# Patient Record
Sex: MALE | Race: BLACK OR AFRICAN AMERICAN | NOT HISPANIC OR LATINO | Employment: OTHER | RURAL
[De-identification: names, ages, dates, MRNs, and addresses within clinical notes are randomized per-mention and may not be internally consistent; named-entity substitution may affect disease eponyms.]

---

## 2020-10-21 ENCOUNTER — HISTORICAL (OUTPATIENT)
Dept: ADMINISTRATIVE | Facility: HOSPITAL | Age: 60
End: 2020-10-21

## 2020-10-21 LAB
BACTERIA #/AREA URNS HPF: ABNORMAL /HPF
BILIRUB UR QL STRIP: NEGATIVE MG/DL
CLARITY UR: CLEAR
COLOR UR: YELLOW
GLUCOSE UR STRIP-MCNC: NEGATIVE MG/DL
HYALINE CASTS #/AREA URNS LPF: ABNORMAL /LPF (ref 0–2)
KETONES UR STRIP-SCNC: NEGATIVE MG/DL
LEUKOCYTE ESTERASE UR QL STRIP: NEGATIVE LEU/UL
NITRITE UR QL STRIP: NEGATIVE
PH UR STRIP: 5 PH UNITS (ref 5–8)
PROT UR QL STRIP: NEGATIVE MG/DL
RBC # UR STRIP: NEGATIVE ERY/UL
RBC #/AREA URNS HPF: ABNORMAL /HPF (ref 0–3)
SP GR UR STRIP: 1.02 (ref 1–1.03)
SQUAMOUS #/AREA URNS LPF: ABNORMAL /LPF
UROBILINOGEN UR STRIP-ACNC: 0.2 EU/DL
WBC #/AREA URNS HPF: ABNORMAL /HPF (ref 0–5)
YEAST #/AREA URNS HPF: ABNORMAL /HPF

## 2020-10-23 LAB
REPORT: NORMAL

## 2022-05-09 ENCOUNTER — HOSPITAL ENCOUNTER (INPATIENT)
Facility: HOSPITAL | Age: 62
LOS: 9 days | Discharge: HOSPICE | DRG: 292 | End: 2022-05-18
Attending: INTERNAL MEDICINE | Admitting: INTERNAL MEDICINE
Payer: MEDICARE

## 2022-05-09 DIAGNOSIS — L97.929 VENOUS ULCERS OF BOTH LOWER EXTREMITIES: ICD-10-CM

## 2022-05-09 DIAGNOSIS — I83.019 VENOUS ULCERS OF BOTH LOWER EXTREMITIES: ICD-10-CM

## 2022-05-09 DIAGNOSIS — I50.82 BIVENTRICULAR CONGESTIVE HEART FAILURE: ICD-10-CM

## 2022-05-09 DIAGNOSIS — R32 INCONTINENCE ASSOCIATED DERMATITIS: ICD-10-CM

## 2022-05-09 DIAGNOSIS — I50.9 HEART FAILURE: ICD-10-CM

## 2022-05-09 DIAGNOSIS — L97.919 VENOUS ULCERS OF BOTH LOWER EXTREMITIES: ICD-10-CM

## 2022-05-09 DIAGNOSIS — I48.91 AFIB: ICD-10-CM

## 2022-05-09 DIAGNOSIS — I83.029 VENOUS ULCERS OF BOTH LOWER EXTREMITIES: ICD-10-CM

## 2022-05-09 DIAGNOSIS — I50.9 CHF (CONGESTIVE HEART FAILURE): ICD-10-CM

## 2022-05-09 DIAGNOSIS — I48.91 A-FIB: ICD-10-CM

## 2022-05-09 DIAGNOSIS — T14.8XXA MULTIPLE WOUNDS OF SKIN: ICD-10-CM

## 2022-05-09 DIAGNOSIS — I87.2 VENOUS STASIS DERMATITIS OF BOTH LOWER EXTREMITIES: ICD-10-CM

## 2022-05-09 DIAGNOSIS — L25.8 INCONTINENCE ASSOCIATED DERMATITIS: ICD-10-CM

## 2022-05-09 PROBLEM — K21.9 GERD (GASTROESOPHAGEAL REFLUX DISEASE): Status: ACTIVE | Noted: 2022-05-09

## 2022-05-09 PROBLEM — F17.200 SMOKER: Status: ACTIVE | Noted: 2022-05-09

## 2022-05-09 PROBLEM — J44.9 CHRONIC OBSTRUCTIVE PULMONARY DISEASE: Status: ACTIVE | Noted: 2022-05-09

## 2022-05-09 PROBLEM — F03.90 DEMENTIA: Status: ACTIVE | Noted: 2022-05-09

## 2022-05-09 PROBLEM — L97.202: Status: ACTIVE | Noted: 2022-05-09

## 2022-05-09 PROBLEM — I10 HTN (HYPERTENSION): Status: ACTIVE | Noted: 2022-05-09

## 2022-05-09 PROBLEM — F32.A DEPRESSION: Status: ACTIVE | Noted: 2022-05-09

## 2022-05-09 PROBLEM — F10.10 ALCOHOL ABUSE: Status: ACTIVE | Noted: 2022-05-09

## 2022-05-09 PROBLEM — J44.1 COPD EXACERBATION: Status: ACTIVE | Noted: 2022-05-09

## 2022-05-09 PROBLEM — N18.2 STAGE 2 CHRONIC KIDNEY DISEASE: Status: ACTIVE | Noted: 2022-05-09

## 2022-05-09 PROBLEM — Z99.81 OXYGEN DEPENDENT: Status: ACTIVE | Noted: 2022-05-09

## 2022-05-09 PROBLEM — N18.9 CKD (CHRONIC KIDNEY DISEASE): Status: ACTIVE | Noted: 2022-05-09

## 2022-05-09 PROBLEM — I13.0 HYPERTENSIVE HEART DISEASE WITH CONGESTIVE HEART FAILURE AND CHRONIC KIDNEY DISEASE: Status: ACTIVE | Noted: 2022-05-09

## 2022-05-09 PROBLEM — I48.0 PAROXYSMAL ATRIAL FIBRILLATION: Status: ACTIVE | Noted: 2022-05-09

## 2022-05-09 PROBLEM — K74.60 CIRRHOSIS: Status: ACTIVE | Noted: 2022-05-09

## 2022-05-09 PROBLEM — I50.43 ACUTE ON CHRONIC COMBINED SYSTOLIC AND DIASTOLIC HEART FAILURE: Status: ACTIVE | Noted: 2022-05-09

## 2022-05-09 PROBLEM — E78.2 MIXED HYPERLIPIDEMIA: Status: ACTIVE | Noted: 2022-05-09

## 2022-05-09 PROCEDURE — 27000221 HC OXYGEN, UP TO 24 HOURS

## 2022-05-09 PROCEDURE — 25000003 PHARM REV CODE 250

## 2022-05-09 PROCEDURE — 94640 AIRWAY INHALATION TREATMENT: CPT

## 2022-05-09 PROCEDURE — 94761 N-INVAS EAR/PLS OXIMETRY MLT: CPT

## 2022-05-09 PROCEDURE — 25000003 PHARM REV CODE 250: Performed by: INTERNAL MEDICINE

## 2022-05-09 PROCEDURE — 99223 PR INITIAL HOSPITAL CARE,LEVL III: ICD-10-PCS | Mod: AI,,, | Performed by: INTERNAL MEDICINE

## 2022-05-09 PROCEDURE — 11000001 HC ACUTE MED/SURG PRIVATE ROOM

## 2022-05-09 PROCEDURE — 25000242 PHARM REV CODE 250 ALT 637 W/ HCPCS

## 2022-05-09 PROCEDURE — 99223 1ST HOSP IP/OBS HIGH 75: CPT | Mod: AI,,, | Performed by: INTERNAL MEDICINE

## 2022-05-09 RX ORDER — AMIODARONE HYDROCHLORIDE 200 MG/1
200 TABLET ORAL 2 TIMES DAILY
Status: DISCONTINUED | OUTPATIENT
Start: 2022-05-09 | End: 2022-05-09

## 2022-05-09 RX ORDER — BUMETANIDE 0.25 MG/ML
1 INJECTION INTRAMUSCULAR; INTRAVENOUS 2 TIMES DAILY
Status: DISCONTINUED | OUTPATIENT
Start: 2022-05-09 | End: 2022-05-11

## 2022-05-09 RX ORDER — HYDRALAZINE HYDROCHLORIDE 20 MG/ML
10 INJECTION INTRAMUSCULAR; INTRAVENOUS EVERY 6 HOURS PRN
Status: DISCONTINUED | OUTPATIENT
Start: 2022-05-09 | End: 2022-05-18 | Stop reason: HOSPADM

## 2022-05-09 RX ORDER — CAMPHOR
SPIRIT TOPICAL 4 TIMES DAILY PRN
Status: DISCONTINUED | OUTPATIENT
Start: 2022-05-09 | End: 2022-05-18 | Stop reason: HOSPADM

## 2022-05-09 RX ORDER — BUPROPION HYDROCHLORIDE 75 MG/1
75 TABLET ORAL 2 TIMES DAILY
Status: ON HOLD | COMMUNITY
Start: 2022-03-28 | End: 2022-05-16

## 2022-05-09 RX ORDER — MUPIROCIN 20 MG/G
OINTMENT TOPICAL 2 TIMES DAILY
Status: COMPLETED | OUTPATIENT
Start: 2022-05-09 | End: 2022-05-14

## 2022-05-09 RX ORDER — BUPROPION HYDROCHLORIDE 75 MG/1
75 TABLET ORAL 2 TIMES DAILY
Status: DISCONTINUED | OUTPATIENT
Start: 2022-05-09 | End: 2022-05-18 | Stop reason: HOSPADM

## 2022-05-09 RX ORDER — VALSARTAN 40 MG/1
40 TABLET ORAL 2 TIMES DAILY
Status: ON HOLD | COMMUNITY
End: 2022-05-18 | Stop reason: HOSPADM

## 2022-05-09 RX ORDER — QUETIAPINE FUMARATE 100 MG/1
100 TABLET, FILM COATED ORAL 2 TIMES DAILY
Status: DISCONTINUED | OUTPATIENT
Start: 2022-05-09 | End: 2022-05-14

## 2022-05-09 RX ORDER — ACETAMINOPHEN 325 MG/1
650 TABLET ORAL EVERY 4 HOURS PRN
Status: DISCONTINUED | OUTPATIENT
Start: 2022-05-09 | End: 2022-05-18 | Stop reason: HOSPADM

## 2022-05-09 RX ORDER — IPRATROPIUM BROMIDE AND ALBUTEROL SULFATE 2.5; .5 MG/3ML; MG/3ML
3 SOLUTION RESPIRATORY (INHALATION) EVERY 6 HOURS
Status: DISCONTINUED | OUTPATIENT
Start: 2022-05-09 | End: 2022-05-18 | Stop reason: HOSPADM

## 2022-05-09 RX ORDER — TALC
6 POWDER (GRAM) TOPICAL NIGHTLY PRN
Status: DISCONTINUED | OUTPATIENT
Start: 2022-05-09 | End: 2022-05-18 | Stop reason: HOSPADM

## 2022-05-09 RX ORDER — BUMETANIDE 0.5 MG/1
1.5 TABLET ORAL DAILY
Status: ON HOLD | COMMUNITY
Start: 2022-03-28 | End: 2022-05-18 | Stop reason: HOSPADM

## 2022-05-09 RX ORDER — ONDANSETRON 4 MG/1
8 TABLET, ORALLY DISINTEGRATING ORAL EVERY 8 HOURS PRN
Status: DISCONTINUED | OUTPATIENT
Start: 2022-05-09 | End: 2022-05-10

## 2022-05-09 RX ORDER — BUMETANIDE 0.25 MG/ML
1 INJECTION INTRAMUSCULAR; INTRAVENOUS DAILY
Status: DISCONTINUED | OUTPATIENT
Start: 2022-05-10 | End: 2022-05-09

## 2022-05-09 RX ORDER — METOPROLOL TARTRATE 50 MG/1
50 TABLET ORAL 2 TIMES DAILY
Status: DISCONTINUED | OUTPATIENT
Start: 2022-05-09 | End: 2022-05-09

## 2022-05-09 RX ORDER — POLYETHYLENE GLYCOL 3350 17 G/17G
17 POWDER, FOR SOLUTION ORAL DAILY
Status: DISCONTINUED | OUTPATIENT
Start: 2022-05-10 | End: 2022-05-18 | Stop reason: HOSPADM

## 2022-05-09 RX ORDER — FAMOTIDINE 10 MG/ML
20 INJECTION INTRAVENOUS 2 TIMES DAILY
Status: DISCONTINUED | OUTPATIENT
Start: 2022-05-09 | End: 2022-05-10

## 2022-05-09 RX ORDER — LACTULOSE 10 G/15ML
20 SOLUTION ORAL 4 TIMES DAILY
Status: DISCONTINUED | OUTPATIENT
Start: 2022-05-09 | End: 2022-05-18 | Stop reason: HOSPADM

## 2022-05-09 RX ORDER — AMIODARONE HYDROCHLORIDE 200 MG/1
200 TABLET ORAL 2 TIMES DAILY
Status: DISCONTINUED | OUTPATIENT
Start: 2022-05-09 | End: 2022-05-11

## 2022-05-09 RX ORDER — QUETIAPINE FUMARATE 100 MG/1
100 TABLET, FILM COATED ORAL 2 TIMES DAILY
Status: ON HOLD | COMMUNITY
End: 2022-05-18 | Stop reason: HOSPADM

## 2022-05-09 RX ORDER — METOLAZONE 2.5 MG/1
2.5 TABLET ORAL DAILY
Status: ON HOLD | COMMUNITY
End: 2022-05-18 | Stop reason: HOSPADM

## 2022-05-09 RX ORDER — BUMETANIDE 0.25 MG/ML
1 INJECTION INTRAMUSCULAR; INTRAVENOUS 2 TIMES DAILY
Status: DISCONTINUED | OUTPATIENT
Start: 2022-05-09 | End: 2022-05-09

## 2022-05-09 RX ORDER — PROCHLORPERAZINE EDISYLATE 5 MG/ML
5 INJECTION INTRAMUSCULAR; INTRAVENOUS EVERY 6 HOURS PRN
Status: DISCONTINUED | OUTPATIENT
Start: 2022-05-09 | End: 2022-05-18 | Stop reason: HOSPADM

## 2022-05-09 RX ORDER — METOPROLOL TARTRATE 50 MG/1
50 TABLET ORAL 2 TIMES DAILY
Status: DISCONTINUED | OUTPATIENT
Start: 2022-05-09 | End: 2022-05-12

## 2022-05-09 RX ORDER — AMIODARONE HYDROCHLORIDE 200 MG/1
200 TABLET ORAL DAILY
COMMUNITY

## 2022-05-09 RX ORDER — CARVEDILOL 3.12 MG/1
3.12 TABLET ORAL 2 TIMES DAILY
Status: ON HOLD | COMMUNITY
End: 2022-05-18 | Stop reason: HOSPADM

## 2022-05-09 RX ADMIN — LACTULOSE 20 G: 20 SOLUTION ORAL at 09:05

## 2022-05-09 RX ADMIN — RIFAXIMIN 550 MG: 550 TABLET ORAL at 09:05

## 2022-05-09 RX ADMIN — METOPROLOL TARTRATE 50 MG: 50 TABLET, FILM COATED ORAL at 09:05

## 2022-05-09 RX ADMIN — QUETIAPINE FUMARATE 100 MG: 100 TABLET ORAL at 09:05

## 2022-05-09 RX ADMIN — AMIODARONE HYDROCHLORIDE 200 MG: 200 TABLET ORAL at 09:05

## 2022-05-09 RX ADMIN — BUPROPION HYDROCHLORIDE 75 MG: 75 TABLET, FILM COATED ORAL at 09:05

## 2022-05-09 RX ADMIN — FAMOTIDINE 20 MG: 10 INJECTION, SOLUTION INTRAVENOUS at 10:05

## 2022-05-09 RX ADMIN — LACTULOSE 20 G: 20 SOLUTION ORAL at 06:05

## 2022-05-09 RX ADMIN — BUMETANIDE 1 MG: 0.25 INJECTION, SOLUTION INTRAMUSCULAR; INTRAVENOUS at 10:05

## 2022-05-09 RX ADMIN — APIXABAN 2.5 MG: 5 TABLET, FILM COATED ORAL at 09:05

## 2022-05-09 RX ADMIN — MUPIROCIN: 20 OINTMENT TOPICAL at 10:05

## 2022-05-09 RX ADMIN — IPRATROPIUM BROMIDE AND ALBUTEROL SULFATE 3 ML: 2.5; .5 SOLUTION RESPIRATORY (INHALATION) at 07:05

## 2022-05-09 NOTE — SUBJECTIVE & OBJECTIVE
Past Medical History:   Diagnosis Date    AAA (abdominal aortic aneurysm)     s/p repair    Anxiety disorder, unspecified     CHF (congestive heart failure)     Biventricular HF    CKD (chronic kidney disease)     COPD (chronic obstructive pulmonary disease)     Current use of long term anticoagulation     DVT (deep venous thrombosis)     GERD (gastroesophageal reflux disease)     HLD (hyperlipidemia)     HTN (hypertension)     Paroxysmal atrial fibrillation     Unspecified cirrhosis of liver     Unspecified cirrhosis of liver        Past Surgical History:   Procedure Laterality Date    atortic aneu      REPAIR OF ABDOMINAL AORTIC ANEURYSM         Review of patient's allergies indicates:   Allergen Reactions    Lasix [furosemide]        No current facility-administered medications on file prior to encounter.     Current Outpatient Medications on File Prior to Encounter   Medication Sig    amiodarone (PACERONE) 200 MG Tab Take 200 mg by mouth 2 (two) times a day.    bumetanide (BUMEX) 0.5 MG Tab Take 2 mg by mouth once daily.    buPROPion (WELLBUTRIN) 75 MG tablet Take 75 mg by mouth 2 (two) times daily.    carvediloL (COREG) 6.25 MG tablet Take 6.25 mg by mouth 2 (two) times daily.    metOLazone (ZAROXOLYN) 2.5 MG tablet Take 2.5 mg by mouth once daily.    QUEtiapine (SEROQUEL) 100 MG Tab Take 100 mg by mouth 2 (two) times a day.    valsartan (DIOVAN) 40 MG tablet Take 40 mg by mouth 2 (two) times daily.    lactulose (CEPHULAC) 20 gram Pack Take 20 g by mouth 4 (four) times daily.    rifAXImin (XIFAXAN) 200 mg Tab Take 550 mg by mouth 2 (two) times daily.     Family History       Problem Relation (Age of Onset)    No Known Problems Mother, Father          Tobacco Use    Smoking status: Current Every Day Smoker     Packs/day: 1.00     Types: Cigarettes    Smokeless tobacco: Never Used   Substance and Sexual Activity    Alcohol use: Not Currently    Drug use: Never    Sexual activity: Not Currently     Review of  Systems   Constitutional:  Negative for activity change, appetite change, chills, diaphoresis, fatigue and fever.   Respiratory:  Positive for shortness of breath.    Cardiovascular:  Positive for leg swelling. Negative for chest pain.   Gastrointestinal:  Negative for nausea and vomiting.   Genitourinary:  Positive for scrotal swelling.   Skin:  Positive for rash and wound.   Neurological:  Negative for weakness and numbness.   Psychiatric/Behavioral:  Negative for confusion.    Objective:     Vital Signs (Most Recent):  Temp: 98.5 °F (36.9 °C) (05/09/22 1804)  Pulse: (!) 11 (05/09/22 1804)  Resp: 20 (05/09/22 1804)  BP: (!) 80/57 (05/09/22 1804)   Vital Signs (24h Range):  Temp:  [98.5 °F (36.9 °C)] 98.5 °F (36.9 °C)  Pulse:  [11] 11  Resp:  [20] 20  BP: (80)/(57) 80/57     Weight: 125 kg (275 lb 9.2 oz)  Body mass index is 34.44 kg/m².    Physical Exam  HENT:      Head: Normocephalic and atraumatic.      Mouth/Throat:      Mouth: Mucous membranes are moist.      Pharynx: Oropharynx is clear.   Eyes:      Extraocular Movements: Extraocular movements intact.      Conjunctiva/sclera: Conjunctivae normal.      Pupils: Pupils are equal, round, and reactive to light.   Cardiovascular:      Rate and Rhythm: Normal rate and regular rhythm.      Pulses: Normal pulses.      Heart sounds: Normal heart sounds.   Pulmonary:      Effort: Pulmonary effort is normal.      Breath sounds: Wheezing present.   Abdominal:      General: Abdomen is flat. Bowel sounds are normal.      Palpations: Abdomen is soft.   Musculoskeletal:         General: Normal range of motion.      Cervical back: Normal range of motion.   Skin:     General: Skin is warm.      Capillary Refill: Capillary refill takes less than 2 seconds.   Neurological:      General: No focal deficit present.      Mental Status: He is alert and oriented to person, place, and time.         CRANIAL NERVES     CN III, IV, VI   Pupils are equal, round, and reactive to light.      Significant Labs: All pertinent labs within the past 24 hours have been reviewed.    Significant Imaging: I have reviewed all pertinent imaging results/findings within the past 24 hours.

## 2022-05-09 NOTE — HPI
The patient is a 60yo AA male with a MedHX of CHF, paroxysmal afib, chronic anticoagulation of eliquis, HTN, HLD, AAA repair, HLD, COPD on home oxygen, CKD stage III, cirrhosis and GERD who presents to LTAC for long-term care of his CHF exacerbation and acute on chronic renal failure. The patient had originally been admitted to [facility] for CHF exacerbation on 04/27, where he was also found to have afib with RVR and heme positive stools. His Eliquis was held and GI saw him and did not recommend any inpatient GI workup. He was started back on his Eliquis and IV diuresis with bumex. This was stopped after his creatinine was found to be elevated above baseline. He was started back on diuretics after his volume status was found to still be decompensated. The patient's home metoprolol dose was also increased while at [facility], and his home losartan, was held.    The patient was found to be stable at [facility] and was accepted to LTAC for further monitoring/care by Dr. Solis for his acute on chronic CHF exacerbation.

## 2022-05-09 NOTE — ASSESSMENT & PLAN NOTE
- Continuous oxygen  - Continue bumetinide 1mg IV bid  - dexter  - Cardiology consult  - ECHO pending  - telemetry  - continue metoprolol 50mg bid   - daily weights  - daily I+O  - low sodium diet  - dietary consult

## 2022-05-09 NOTE — ASSESSMENT & PLAN NOTE
- Continuous oxygen  - Continue bumetinide 1mg IV bid  - Cardiology consult  - ECHO pending  - telemetry  - continue metoprolol 50mg bid   - daily weights  - daily I+O  - low sodium diet  - dietary consult

## 2022-05-09 NOTE — H&P
[facility] Medicine  History & Physical    Patient Name: Modesto Patrick  MRN: [id number]  Patient Class: IP- Inpatient  Admission Date: 5/9/2022  Attending Physician: Neeta Solis MD   Primary Care Provider: Primary Doctor No         Patient information was obtained from patient, past medical records and ER records.     Subjective:     Principal Problem:Acute on chronic combined systolic and diastolic heart failure    Chief Complaint: No chief complaint on file.       HPI: The patient is a 62yo AA male with a MedHX of CHF, paroxysmal afib, chronic anticoagulation of eliquis, HTN, HLD, AAA repair, HLD, COPD on home oxygen, CKD stage III, cirrhosis and GERD who presents to LT for long-term care of his CHF exacerbation and acute on chronic renal failure. The patient had originally been admitted to [facility] for CHF exacerbation on 04/27, where he was also found to have afib with RVR and heme positive stools. His Eliquis was held and GI saw him and did not recommend any inpatient GI workup. He was started back on his Eliquis and IV diuresis with bumex. This was stopped after his creatinine was found to be elevated above baseline. He was started back on diuretics after his volume status was found to still be decompensated. The patient's home metoprolol dose was also increased while at [facility], and his home losartan, was held.    The patient was found to be stable at [facility] and was accepted to LTAC for further monitoring/care by Dr. Solis for his acute on chronic CHF exacerbation.      Past Medical History:   Diagnosis Date    CHF (congestive heart failure)     CKD (chronic kidney disease)     COPD (chronic obstructive pulmonary disease)     Current use of long term anticoagulation     HLD (hyperlipidemia)     HTN (hypertension)     Paroxysmal atrial fibrillation     Unspecified cirrhosis of liver        Past Surgical History:   Procedure Laterality  Date    atortic aneu      REPAIR OF ABDOMINAL AORTIC ANEURYSM         Review of patient's allergies indicates:  Not on File    No current facility-administered medications on file prior to encounter.     Current Outpatient Medications on File Prior to Encounter   Medication Sig    amiodarone (PACERONE) 200 MG Tab Take 200 mg by mouth 2 (two) times a day.    bumetanide (BUMEX) 0.5 MG Tab Take 2 mg by mouth once daily.    buPROPion (WELLBUTRIN) 75 MG tablet Take 75 mg by mouth 2 (two) times daily.    carvediloL (COREG) 6.25 MG tablet Take 6.25 mg by mouth 2 (two) times daily.    metOLazone (ZAROXOLYN) 2.5 MG tablet Take 2.5 mg by mouth once daily.    QUEtiapine (SEROQUEL) 100 MG Tab Take 100 mg by mouth 2 (two) times a day.    valsartan (DIOVAN) 40 MG tablet Take 40 mg by mouth 2 (two) times daily.    lactulose (CEPHULAC) 20 gram Pack Take 20 g by mouth 4 (four) times daily.    rifAXImin (XIFAXAN) 200 mg Tab Take 550 mg by mouth 2 (two) times daily.     Family History       Problem Relation (Age of Onset)    No Known Problems Mother, Father          Tobacco Use    Smoking status: Current Every Day Smoker     Packs/day: 1.00     Types: Cigarettes    Smokeless tobacco: Never Used   Substance and Sexual Activity    Alcohol use: Not Currently    Drug use: Never    Sexual activity: Not Currently     Review of Systems   Constitutional:  Negative for activity change, appetite change, chills, diaphoresis and fatigue.   Eyes:  Negative for photophobia and visual disturbance.   Respiratory:  Positive for shortness of breath.    Cardiovascular:  Positive for leg swelling. Negative for chest pain and palpitations.   Gastrointestinal:  Negative for blood in stool, nausea and vomiting.   Genitourinary:  Positive for scrotal swelling.   Skin:  Positive for rash and wound.   Neurological:  Negative for weakness, numbness and headaches.   Psychiatric/Behavioral:  Negative for confusion.    Objective:     Vital Signs  (Most Recent):    Vital Signs (24h Range):           There is no height or weight on file to calculate BMI.    Physical Exam  HENT:      Head: Normocephalic and atraumatic.      Mouth/Throat:      Mouth: Mucous membranes are moist.      Pharynx: Oropharynx is clear.   Eyes:      Extraocular Movements: Extraocular movements intact.      Conjunctiva/sclera: Conjunctivae normal.      Pupils: Pupils are equal, round, and reactive to light.   Cardiovascular:      Rate and Rhythm: Normal rate and regular rhythm.      Pulses: Normal pulses.      Heart sounds: Normal heart sounds.   Pulmonary:      Breath sounds: Wheezing present.   Abdominal:      General: Abdomen is flat. Bowel sounds are normal.      Palpations: Abdomen is soft.   Musculoskeletal:         General: Normal range of motion.      Cervical back: Normal range of motion.   Skin:     General: Skin is warm.      Capillary Refill: Capillary refill takes less than 2 seconds.   Neurological:      General: No focal deficit present.      Mental Status: He is alert and oriented to person, place, and time.         CRANIAL NERVES     CN III, IV, VI   Pupils are equal, round, and reactive to light.     Significant Labs: All pertinent labs within the past 24 hours have been reviewed.    Significant Imaging: I have reviewed all pertinent imaging results/findings within the past 24 hours.    Assessment/Plan:     * Acute on chronic biventricular congestive heart failure  - Continuous oxygen  - Continue bumetinide 1mg IV bid  - duonebs  - Cardiology consult  - ECHO pending  - telemetry  - continue metoprolol 50mg bid   - daily weights  - daily I+O  - low sodium diet  - dietary consult        A-fib  - continue eliquis 2.5mg bid  - continue amiodarone 200mg bid  - telemetry      COPD (chronic obstructive pulmonary disease)  - continual oxygen via [place]  - duonebs q6      CKD (chronic kidney disease)  - bmp pending  - records say creatinine at baseline at 3.3 today at  [place]      GERD (gastroesophageal reflux disease)  - famotidine injection 20mg bID      Multiple wounds of skin  - has bilateral chronic lower extremity skin changes/wounds  - wound care consult       Depression  - continue buproprion      Cirrhosis  - continue rifaximin 550mg bid  - continue lactulose 20mg qid      HTN (hypertension)  - continue metoprolol 50mg bid      VTE Risk Mitigation (From admission, onward)         Ordered     apixaban tablet 2.5 mg  2 times daily         05/09/22 1746     IP VTE HIGH RISK PATIENT  Once         05/09/22 1736     Place sequential compression device  Until discontinued         05/09/22 1736                   Saskia Cortez MD  Department of Hospital Medicine   [facility] [place]

## 2022-05-09 NOTE — SUBJECTIVE & OBJECTIVE
Past Medical History:   Diagnosis Date    CHF (congestive heart failure)     CKD (chronic kidney disease)     COPD (chronic obstructive pulmonary disease)     Current use of long term anticoagulation     HLD (hyperlipidemia)     HTN (hypertension)     Paroxysmal atrial fibrillation     Unspecified cirrhosis of liver        Past Surgical History:   Procedure Laterality Date    atortic aneu      REPAIR OF ABDOMINAL AORTIC ANEURYSM         Review of patient's allergies indicates:  Not on File    No current facility-administered medications on file prior to encounter.     Current Outpatient Medications on File Prior to Encounter   Medication Sig    amiodarone (PACERONE) 200 MG Tab Take 200 mg by mouth 2 (two) times a day.    bumetanide (BUMEX) 0.5 MG Tab Take 2 mg by mouth once daily.    buPROPion (WELLBUTRIN) 75 MG tablet Take 75 mg by mouth 2 (two) times daily.    carvediloL (COREG) 6.25 MG tablet Take 6.25 mg by mouth 2 (two) times daily.    metOLazone (ZAROXOLYN) 2.5 MG tablet Take 2.5 mg by mouth once daily.    QUEtiapine (SEROQUEL) 100 MG Tab Take 100 mg by mouth 2 (two) times a day.    valsartan (DIOVAN) 40 MG tablet Take 40 mg by mouth 2 (two) times daily.    lactulose (CEPHULAC) 20 gram Pack Take 20 g by mouth 4 (four) times daily.    rifAXImin (XIFAXAN) 200 mg Tab Take 550 mg by mouth 2 (two) times daily.     Family History       Problem Relation (Age of Onset)    No Known Problems Mother, Father          Tobacco Use    Smoking status: Current Every Day Smoker     Packs/day: 1.00     Types: Cigarettes    Smokeless tobacco: Never Used   Substance and Sexual Activity    Alcohol use: Not Currently    Drug use: Never    Sexual activity: Not Currently     Review of Systems   Constitutional:  Negative for activity change, appetite change, chills, diaphoresis and fatigue.   Eyes:  Negative for photophobia and visual disturbance.   Respiratory:  Positive for shortness of breath.    Cardiovascular:  Positive for leg  swelling. Negative for chest pain and palpitations.   Gastrointestinal:  Negative for blood in stool, nausea and vomiting.   Genitourinary:  Positive for scrotal swelling.   Skin:  Positive for rash and wound.   Neurological:  Negative for weakness, numbness and headaches.   Psychiatric/Behavioral:  Negative for confusion.    Objective:     Vital Signs (Most Recent):    Vital Signs (24h Range):           There is no height or weight on file to calculate BMI.    Physical Exam  HENT:      Head: Normocephalic and atraumatic.      Mouth/Throat:      Mouth: Mucous membranes are moist.      Pharynx: Oropharynx is clear.   Eyes:      Extraocular Movements: Extraocular movements intact.      Conjunctiva/sclera: Conjunctivae normal.      Pupils: Pupils are equal, round, and reactive to light.   Cardiovascular:      Rate and Rhythm: Normal rate and regular rhythm.      Pulses: Normal pulses.      Heart sounds: Normal heart sounds.   Pulmonary:      Breath sounds: Wheezing present.   Abdominal:      General: Abdomen is flat. Bowel sounds are normal.      Palpations: Abdomen is soft.   Musculoskeletal:         General: Normal range of motion.      Cervical back: Normal range of motion.   Skin:     General: Skin is warm.      Capillary Refill: Capillary refill takes less than 2 seconds.   Neurological:      General: No focal deficit present.      Mental Status: He is alert and oriented to person, place, and time.         CRANIAL NERVES     CN III, IV, VI   Pupils are equal, round, and reactive to light.     Significant Labs: All pertinent labs within the past 24 hours have been reviewed.    Significant Imaging: I have reviewed all pertinent imaging results/findings within the past 24 hours.

## 2022-05-10 PROBLEM — N17.9 RENAL FAILURE (ARF), ACUTE ON CHRONIC: Status: ACTIVE | Noted: 2022-05-10

## 2022-05-10 PROBLEM — N18.9 RENAL FAILURE (ARF), ACUTE ON CHRONIC: Status: ACTIVE | Noted: 2022-05-10

## 2022-05-10 LAB
ANION GAP SERPL CALCULATED.3IONS-SCNC: 11 MMOL/L (ref 7–16)
AORTIC ROOT ANNULUS: 3.4 CM
AORTIC VALVE CUSP SEPERATION: 15.7 CM
AV INDEX (PROSTH): 0.14
AV MEAN GRADIENT: 9 MMHG
AV PEAK GRADIENT: 16 MMHG
AV VALVE AREA: 0.47 CM2
AV VELOCITY RATIO: 0.2
BASOPHILS # BLD AUTO: 0.02 K/UL (ref 0–0.2)
BASOPHILS NFR BLD AUTO: 0.3 % (ref 0–1)
BSA FOR ECHO PROCEDURE: 2.57 M2
BUN SERPL-MCNC: 73 MG/DL (ref 7–18)
BUN/CREAT SERPL: 20 (ref 6–20)
CALCIUM SERPL-MCNC: 8.7 MG/DL (ref 8.5–10.1)
CHLORIDE SERPL-SCNC: 103 MMOL/L (ref 98–107)
CO2 SERPL-SCNC: 31 MMOL/L (ref 21–32)
CREAT SERPL-MCNC: 3.67 MG/DL (ref 0.7–1.3)
CV ECHO LV RWT: 0.43 CM
DIFFERENTIAL METHOD BLD: ABNORMAL
DOP CALC AO PEAK VEL: 2 M/S
DOP CALC AO VTI: 37 CM
DOP CALC LVOT AREA: 3.5 CM2
DOP CALC LVOT DIAMETER: 2.1 CM
DOP CALC LVOT PEAK VEL: 0.4 M/S
DOP CALC LVOT STROKE VOLUME: 17.31 CM3
DOP CALC MV VTI: 57 CM
DOP CALCLVOT PEAK VEL VTI: 5 CM
E WAVE DECELERATION TIME: 179 MSEC
ECHO EF ESTIMATED: 25 %
ECHO LV POSTERIOR WALL: 1.27 CM (ref 0.6–1.1)
EJECTION FRACTION: 20 %
EOSINOPHIL # BLD AUTO: 0.03 K/UL (ref 0–0.5)
EOSINOPHIL NFR BLD AUTO: 0.4 % (ref 1–4)
ERYTHROCYTE [DISTWIDTH] IN BLOOD BY AUTOMATED COUNT: 16.7 % (ref 11.5–14.5)
FRACTIONAL SHORTENING: 8 % (ref 28–44)
GLUCOSE SERPL-MCNC: 101 MG/DL (ref 74–106)
GLUCOSE SERPL-MCNC: 132 MG/DL (ref 70–105)
GLUCOSE SERPL-MCNC: 143 MG/DL (ref 70–105)
HCT VFR BLD AUTO: 29 % (ref 40–54)
HGB BLD-MCNC: 9 G/DL (ref 13.5–18)
IMM GRANULOCYTES # BLD AUTO: 0.02 K/UL (ref 0–0.04)
IMM GRANULOCYTES NFR BLD: 0.3 % (ref 0–0.4)
INTERVENTRICULAR SEPTUM: 1.22 CM (ref 0.6–1.1)
IVC OSTIUM: 3 CM
LEFT ATRIUM SIZE: 4.8 CM
LEFT INTERNAL DIMENSION IN SYSTOLE: 5.4 CM (ref 2.1–4)
LEFT VENTRICLE DIASTOLIC VOLUME INDEX: 69 ML/M2
LEFT VENTRICLE DIASTOLIC VOLUME: 173.2 ML
LEFT VENTRICLE MASS INDEX: 128 G/M2
LEFT VENTRICLE SYSTOLIC VOLUME INDEX: 56.3 ML/M2
LEFT VENTRICLE SYSTOLIC VOLUME: 141.3 ML
LEFT VENTRICULAR INTERNAL DIMENSION IN DIASTOLE: 5.9 CM (ref 3.5–6)
LEFT VENTRICULAR MASS: 321.1 G
LVOT MG: 0.3 MMHG
LYMPHOCYTES # BLD AUTO: 1.27 K/UL (ref 1–4.8)
LYMPHOCYTES NFR BLD AUTO: 18.4 % (ref 27–41)
MCH RBC QN AUTO: 30.3 PG (ref 27–31)
MCHC RBC AUTO-ENTMCNC: 31 G/DL (ref 32–36)
MCV RBC AUTO: 97.6 FL (ref 80–96)
MONOCYTES # BLD AUTO: 1.47 K/UL (ref 0–0.8)
MONOCYTES NFR BLD AUTO: 21.3 % (ref 2–6)
MPC BLD CALC-MCNC: 10.4 FL (ref 9.4–12.4)
MV MEAN GRADIENT: 17 MMHG
MV PEAK E VEL: 1.8 M/S
MV PEAK GRADIENT: 28 MMHG
MV STENOSIS PRESSURE HALF TIME: 50 MS
MV VALVE AREA BY CONTINUITY EQUATION: 0.3 CM2
MV VALVE AREA P 1/2 METHOD: 4.4 CM2
NEUTROPHILS # BLD AUTO: 4.08 K/UL (ref 1.8–7.7)
NEUTROPHILS NFR BLD AUTO: 59.3 % (ref 53–65)
NRBC # BLD AUTO: 0 X10E3/UL
NRBC, AUTO (.00): 0 %
PISA TR MAX VEL: 2.5 M/S
PLATELET # BLD AUTO: 125 K/UL (ref 150–400)
POTASSIUM SERPL-SCNC: 5.4 MMOL/L (ref 3.5–5.1)
RA MAJOR: 5 CM
RA PRESSURE: 15 MMHG
RBC # BLD AUTO: 2.97 M/UL (ref 4.6–6.2)
RIGHT VENTRICULAR END-DIASTOLIC DIMENSION: 5.1 CM
SODIUM SERPL-SCNC: 140 MMOL/L (ref 136–145)
TR MAX PG: 25 MMHG
TRICUSPID ANNULAR PLANE SYSTOLIC EXCURSION: 1.9 CM
TV REST PULMONARY ARTERY PRESSURE: 40 MMHG
WBC # BLD AUTO: 6.89 K/UL (ref 4.5–11)

## 2022-05-10 PROCEDURE — 82962 GLUCOSE BLOOD TEST: CPT

## 2022-05-10 PROCEDURE — 99232 SBSQ HOSP IP/OBS MODERATE 35: CPT | Mod: ,,, | Performed by: NURSE PRACTITIONER

## 2022-05-10 PROCEDURE — 36415 COLL VENOUS BLD VENIPUNCTURE: CPT

## 2022-05-10 PROCEDURE — 25000242 PHARM REV CODE 250 ALT 637 W/ HCPCS

## 2022-05-10 PROCEDURE — 27000221 HC OXYGEN, UP TO 24 HOURS

## 2022-05-10 PROCEDURE — 99232 PR SUBSEQUENT HOSPITAL CARE,LEVL II: ICD-10-PCS | Mod: ,,, | Performed by: NURSE PRACTITIONER

## 2022-05-10 PROCEDURE — 11000001 HC ACUTE MED/SURG PRIVATE ROOM

## 2022-05-10 PROCEDURE — 63600175 PHARM REV CODE 636 W HCPCS: Performed by: INTERNAL MEDICINE

## 2022-05-10 PROCEDURE — 94761 N-INVAS EAR/PLS OXIMETRY MLT: CPT

## 2022-05-10 PROCEDURE — P9047 ALBUMIN (HUMAN), 25%, 50ML: HCPCS | Performed by: INTERNAL MEDICINE

## 2022-05-10 PROCEDURE — 80048 BASIC METABOLIC PNL TOTAL CA: CPT

## 2022-05-10 PROCEDURE — 99232 PR SUBSEQUENT HOSPITAL CARE,LEVL II: ICD-10-PCS | Mod: ,,, | Performed by: INTERNAL MEDICINE

## 2022-05-10 PROCEDURE — 97166 OT EVAL MOD COMPLEX 45 MIN: CPT

## 2022-05-10 PROCEDURE — 99232 SBSQ HOSP IP/OBS MODERATE 35: CPT | Mod: ,,, | Performed by: INTERNAL MEDICINE

## 2022-05-10 PROCEDURE — 97162 PT EVAL MOD COMPLEX 30 MIN: CPT

## 2022-05-10 PROCEDURE — 25000003 PHARM REV CODE 250

## 2022-05-10 PROCEDURE — 94640 AIRWAY INHALATION TREATMENT: CPT

## 2022-05-10 PROCEDURE — 25000003 PHARM REV CODE 250: Performed by: INTERNAL MEDICINE

## 2022-05-10 PROCEDURE — 85025 COMPLETE CBC W/AUTO DIFF WBC: CPT

## 2022-05-10 RX ORDER — SILVER SULFADIAZINE 10 G/1000G
CREAM TOPICAL
Status: DISCONTINUED | OUTPATIENT
Start: 2022-05-10 | End: 2022-05-18 | Stop reason: HOSPADM

## 2022-05-10 RX ORDER — MICONAZOLE NITRATE 2 %
POWDER (GRAM) TOPICAL 2 TIMES DAILY
Status: DISCONTINUED | OUTPATIENT
Start: 2022-05-11 | End: 2022-05-18 | Stop reason: HOSPADM

## 2022-05-10 RX ORDER — FAMOTIDINE 20 MG/1
20 TABLET, FILM COATED ORAL DAILY
Status: DISCONTINUED | OUTPATIENT
Start: 2022-05-11 | End: 2022-05-18 | Stop reason: HOSPADM

## 2022-05-10 RX ORDER — ONDANSETRON 4 MG/1
8 TABLET, FILM COATED ORAL EVERY 8 HOURS PRN
Status: DISCONTINUED | OUTPATIENT
Start: 2022-05-10 | End: 2022-05-18 | Stop reason: HOSPADM

## 2022-05-10 RX ORDER — FAMOTIDINE 20 MG/1
20 TABLET, FILM COATED ORAL 2 TIMES DAILY
Status: DISCONTINUED | OUTPATIENT
Start: 2022-05-10 | End: 2022-05-10

## 2022-05-10 RX ORDER — ALBUMIN HUMAN 250 G/1000ML
25 SOLUTION INTRAVENOUS ONCE
Status: COMPLETED | OUTPATIENT
Start: 2022-05-10 | End: 2022-05-10

## 2022-05-10 RX ADMIN — METOPROLOL TARTRATE 50 MG: 50 TABLET, FILM COATED ORAL at 09:05

## 2022-05-10 RX ADMIN — MUPIROCIN: 20 OINTMENT TOPICAL at 09:05

## 2022-05-10 RX ADMIN — RIFAXIMIN 550 MG: 550 TABLET ORAL at 09:05

## 2022-05-10 RX ADMIN — FAMOTIDINE 20 MG: 20 TABLET ORAL at 09:05

## 2022-05-10 RX ADMIN — BUPROPION HYDROCHLORIDE 75 MG: 75 TABLET, FILM COATED ORAL at 09:05

## 2022-05-10 RX ADMIN — SODIUM CHLORIDE, POTASSIUM CHLORIDE, SODIUM LACTATE AND CALCIUM CHLORIDE 250 ML: 600; 310; 30; 20 INJECTION, SOLUTION INTRAVENOUS at 10:05

## 2022-05-10 RX ADMIN — AMIODARONE HYDROCHLORIDE 200 MG: 200 TABLET ORAL at 09:05

## 2022-05-10 RX ADMIN — ALBUMIN (HUMAN) 25 G: 12.5 SOLUTION INTRAVENOUS at 01:05

## 2022-05-10 RX ADMIN — IPRATROPIUM BROMIDE AND ALBUTEROL SULFATE 3 ML: 2.5; .5 SOLUTION RESPIRATORY (INHALATION) at 08:05

## 2022-05-10 RX ADMIN — LACTULOSE 20 G: 20 SOLUTION ORAL at 09:05

## 2022-05-10 RX ADMIN — QUETIAPINE FUMARATE 100 MG: 100 TABLET ORAL at 09:05

## 2022-05-10 RX ADMIN — IPRATROPIUM BROMIDE AND ALBUTEROL SULFATE 3 ML: 2.5; .5 SOLUTION RESPIRATORY (INHALATION) at 07:05

## 2022-05-10 RX ADMIN — APIXABAN 2.5 MG: 5 TABLET, FILM COATED ORAL at 09:05

## 2022-05-10 RX ADMIN — IPRATROPIUM BROMIDE AND ALBUTEROL SULFATE 3 ML: 2.5; .5 SOLUTION RESPIRATORY (INHALATION) at 12:05

## 2022-05-10 RX ADMIN — IPRATROPIUM BROMIDE AND ALBUTEROL SULFATE 3 ML: 2.5; .5 SOLUTION RESPIRATORY (INHALATION) at 01:05

## 2022-05-10 RX ADMIN — LACTULOSE 20 G: 20 SOLUTION ORAL at 02:05

## 2022-05-10 RX ADMIN — POLYETHYLENE GLYCOL 3350 17 G: 17 POWDER, FOR SOLUTION ORAL at 09:05

## 2022-05-10 RX ADMIN — BUMETANIDE 1 MG: 0.25 INJECTION, SOLUTION INTRAMUSCULAR; INTRAVENOUS at 10:05

## 2022-05-10 RX ADMIN — LACTULOSE 20 G: 20 SOLUTION ORAL at 05:05

## 2022-05-10 NOTE — PROGRESS NOTES
Pharmacist Intervention IV to PO Note    Modesto Patrick is a 61 y.o. male being treated with IV medication famotidine    Patient Data:    Vital Signs (Most Recent):  Temp: 98.1 °F (36.7 °C) (05/10/22 0400)  Pulse: 98 (05/10/22 0738)  Resp: 18 (05/10/22 0738)  BP: (!) 80/55 (05/10/22 0400)  SpO2: 100 % (05/10/22 0738)   Vital Signs (72h Range):  Temp:  [98.1 °F (36.7 °C)-98.5 °F (36.9 °C)]   Pulse:  []   Resp:  [18-20]   BP: (80-90)/(55-72)   SpO2:  [92 %-100 %]      CBC:  Recent Labs   Lab 05/10/22  0342   WBC 6.89   RBC 2.97*   HGB 9.0*   HCT 29.0*   *   MCV 97.6*   MCH 30.3   MCHC 31.0*     CMP:     Recent Labs   Lab 05/10/22  0342      CALCIUM 8.7      K 5.4*   CO2 31      BUN 73*   CREATININE 3.67*       Dietary Orders:  Diet Orders  Report           Diet Low Sodium, 2gm: Low Sodium, 2gm starting at 05/09 1811            Based on the following criteria, this patient qualifies for intravenous to oral conversion:  [x] The patients gastrointestinal tract is functioning (tolerating medications via oral or enteral route for 24 hours and tolerating food or enteral feeds for 24 hours).    IV medication famotidine will be changed to oral medication famotidine    Pharmacist's Name: Hayley Fitzpatrick  Pharmacist's Extension: [phone]

## 2022-05-10 NOTE — PROGRESS NOTES
[facility]  [facility]  First Assessment Note         Reason for Assessment  Reason For Assessment: consult  Nutrition Risk Screen: no indicators present  Malnutrition  Is Patient Malnourished: No  Nutrition Diagnosis  Increased protein Needs   related to Decreased/ impaired skin integrity as evidenced by wounds    Nutrition Diagnosis Status: New      Nutrition Risk  Level of Risk/Frequency of Follow-up: high   Chewing or Swallowing Difficulty?: No Chewing or swallowing difficulty  Estimated/Assessed Needs  RMR (Greeley-St. Jeor Equation): 2126.63 Activity Factor: 1 Injury Factor: 1.2     Temp: 98.7 °F (37.1 °C)Oral  Weight Used For Calorie Calculations: 124.6 kg (274 lb 11.1 oz)   Energy Need Method: [place]-St Jeor    Weight Used For Protein Calculations: 93 kg (205 lb 0.4 oz)  Protein Requirements: 112-140  Estimated Fluid Requirement Method: RDA Method          Nutrition Prescription / Recommendations  Recommendation/Intervention: continue low sodium diet; will add ensure enlive to all meal trays to assist pt in meeting est nutr needs and facilitate wound healing  Goals: pt will meet est nutr needs; wound healing  Nutrition Goal Status: new  Current Diet Order: low sodium  Nutrition Order Comments: no % meal intake documented at this time  Recommended Diet: Low Sodium ( 2g Sodium)  Recommended Oral Supplement: Yovani [90 kcals, 2.5g Protein, 10g Carbs(3g Sugar), 7g L-Arginine, 7g L-Glutamine, Vitamin C 300mg, 9.5mg Zinc] three times daily and Ensure Enlive [360 kcals, 20g Protein, 44g Carbs(3g Fiber, 20g Sugar), 11g Fat three times daily  Is Nutrition Support Recommended: No  Is Education Recommended: No  Monitor and Evaluation  % current Intake: New Diet order with no P.O. intake documented currently  % intake to meet estimated needs: 75 - 100 %  Food and Nutrient Intake: energy intake, food and beverage intake  Food and Nutrient Adminstration: diet order  Anthropometric Measurements:  "height/length, weight, weight change, body mass index  Biochemical Data, Medical Tests and Procedures: electrolyte and renal panel, glucose/endocrine profile  Nutrition-Focused Physical Findings: skin     Current Medical Diagnosis and Past Medical History  Diagnosis: cardiac disease, pulmonary disease, renal disease, liver disease, gastrointestinal disease  Past Medical History:   Diagnosis Date    AAA (abdominal aortic aneurysm)     s/p repair    Anxiety disorder, unspecified     CHF (congestive heart failure)     Biventricular HF    CKD (chronic kidney disease)     COPD (chronic obstructive pulmonary disease)     Current use of long term anticoagulation     DVT (deep venous thrombosis)     GERD (gastroesophageal reflux disease)     HLD (hyperlipidemia)     HTN (hypertension)     Paroxysmal atrial fibrillation     Unspecified cirrhosis of liver     Unspecified cirrhosis of liver      Nutrition/Diet History  Food Allergies: NKFA  Lab/Procedures/Meds  Recent Labs   Lab 05/10/22  0342      K 5.4*   BUN 73*   CREATININE 3.67*      CALCIUM 8.7        Last A1c: No results found for: HGBA1C  Lab Results   Component Value Date    RBC 2.97 (L) 05/10/2022    HGB 9.0 (L) 05/10/2022    HCT 29.0 (L) 05/10/2022    MCV 97.6 (H) 05/10/2022    MCH 30.3 05/10/2022    MCHC 31.0 (L) 05/10/2022     Pertinent Labs Reviewed: reviewed  Pertinent Labs Comments: Hct 29.0, K 5.4, BUN 73, Creat 3.67  Pertinent Medications Reviewed: reviewed  Anthropometrics  Temp: 98.7 °F (37.1 °C)  Height Method: Stated  Height: 6' 3" (190.5 cm)  Height (inches): 75 in  Weight Method: Bed Scale  Weight: 124.6 kg (274 lb 11.1 oz)  Weight (lb): 274.7 lb  Ideal Body Weight (IBW), Male: 196 lb  % Ideal Body Weight, Male (lb): 140.15 %  BMI (Calculated): 34.3  BMI Grade: 30 - 34.9- obesity - grade I     Nutrition by Nursing  Diet/Nutrition Received: regular     Diet/Feeding Assistance: tray set-up     Last Bowel Movement: " 05/09/22              Nutrition Follow-Up  RD Follow-up?: Yes  Assessment and Plan  No new Assessment & Plan notes have been filed under this hospital service since the last note was generated.  Service: Nutrition

## 2022-05-10 NOTE — SUBJECTIVE & OBJECTIVE
Interval History:     BAIGAILEO  Having breakfast this am  Says wants to be DNR/dni    Review of Systems   Constitutional:  Negative for activity change, appetite change, chills, diaphoresis, fatigue and fever.   Respiratory:  Positive for shortness of breath.    Cardiovascular:  Positive for leg swelling. Negative for chest pain.   Gastrointestinal:  Negative for nausea and vomiting.   Genitourinary:  Positive for scrotal swelling.   Skin:  Positive for rash and wound.   Neurological:  Negative for weakness and numbness.   Psychiatric/Behavioral:  Negative for confusion.    Objective:     Vital Signs (Most Recent):  Temp: 98.7 °F (37.1 °C) (05/10/22 0800)  Pulse: (!) 121 (05/10/22 0800)  Resp: 20 (05/10/22 0800)  BP: 92/76 (05/10/22 0800)  SpO2: (!) 94 % (05/10/22 0800)   Vital Signs (24h Range):  Temp:  [98.1 °F (36.7 °C)-98.7 °F (37.1 °C)] 98.7 °F (37.1 °C)  Pulse:  [] 121  Resp:  [18-20] 20  SpO2:  [92 %-100 %] 94 %  BP: (80-92)/(55-76) 92/76     Weight: 124.6 kg (274 lb 11.1 oz)  Body mass index is 34.33 kg/m².    Intake/Output Summary (Last 24 hours) at 5/10/2022 1139  Last data filed at 5/10/2022 0500  Gross per 24 hour   Intake 200 ml   Output 550 ml   Net -350 ml      Physical Exam  HENT:      Head: Normocephalic and atraumatic.      Mouth/Throat:      Mouth: Mucous membranes are moist.      Pharynx: Oropharynx is clear.   Eyes:      Extraocular Movements: Extraocular movements intact.      Conjunctiva/sclera: Conjunctivae normal.      Pupils: Pupils are equal, round, and reactive to light.   Cardiovascular:      Rate and Rhythm: Normal rate and regular rhythm.      Pulses: Normal pulses.      Heart sounds: Normal heart sounds.   Pulmonary:      Effort: Pulmonary effort is normal.   Abdominal:      General: Abdomen is flat. Bowel sounds are normal.      Palpations: Abdomen is soft.   Musculoskeletal:         General: Normal range of motion.      Cervical back: Normal range of motion.   Skin:     General:  Skin is warm.      Capillary Refill: Capillary refill takes less than 2 seconds.   Neurological:      General: No focal deficit present.      Mental Status: He is alert and oriented to person, place, and time.       Significant Labs: All pertinent labs within the past 24 hours have been reviewed.    Significant Imaging: I have reviewed all pertinent imaging results/findings within the past 24 hours.

## 2022-05-10 NOTE — PROGRESS NOTES
[facility] Medicine  Progress Note    Patient Name: Modesto Patrick  MRN: [id number]  Patient Class: IP- Inpatient   Admission Date: 5/9/2022  Length of Stay: 1 days  Attending Physician: Neeta Solis MD  Primary Care Provider: Primary Doctor No        Subjective:     Principal Problem:Biventricular congestive heart failure        HPI:  The patient is a 62yo AA male with a MedHX of CHF, paroxysmal afib, chronic anticoagulation of eliquis, HTN, HLD, AAA repair, HLD, COPD on home oxygen, CKD stage III, cirrhosis and GERD who presents to LTAC for long-term care of his CHF exacerbation and acute on chronic renal failure. The patient had originally been admitted to [facility] for CHF exacerbation on 04/27, where he was also found to have afib with RVR and heme positive stools. His Eliquis was held and GI saw him and did not recommend any inpatient GI workup. He was started back on his Eliquis and IV diuresis with bumex. This was stopped after his creatinine was found to be elevated above baseline. He was started back on diuretics after his volume status was found to still be decompensated. The patient's home metoprolol dose was also increased while at [facility], and his home losartan, was held.    The patient was found to be stable at [facility] and was accepted to LTAC for further monitoring/care by Dr. Solis for his acute on chronic CHF exacerbation.      Overview/Hospital Course:  No notes on file    Interval History:     NAEO  Having breakfast this am  Says wants to be DNR/dni    Review of Systems   Constitutional:  Negative for activity change, appetite change, chills, diaphoresis, fatigue and fever.   Respiratory:  Positive for shortness of breath.    Cardiovascular:  Positive for leg swelling. Negative for chest pain.   Gastrointestinal:  Negative for nausea and vomiting.   Genitourinary:  Positive for scrotal swelling.   Skin:  Positive for rash and wound.    Neurological:  Negative for weakness and numbness.   Psychiatric/Behavioral:  Negative for confusion.    Objective:     Vital Signs (Most Recent):  Temp: 98.7 °F (37.1 °C) (05/10/22 0800)  Pulse: (!) 121 (05/10/22 0800)  Resp: 20 (05/10/22 0800)  BP: 92/76 (05/10/22 0800)  SpO2: (!) 94 % (05/10/22 0800)   Vital Signs (24h Range):  Temp:  [98.1 °F (36.7 °C)-98.7 °F (37.1 °C)] 98.7 °F (37.1 °C)  Pulse:  [] 121  Resp:  [18-20] 20  SpO2:  [92 %-100 %] 94 %  BP: (80-92)/(55-76) 92/76     Weight: 124.6 kg (274 lb 11.1 oz)  Body mass index is 34.33 kg/m².    Intake/Output Summary (Last 24 hours) at 5/10/2022 1139  Last data filed at 5/10/2022 0500  Gross per 24 hour   Intake 200 ml   Output 550 ml   Net -350 ml      Physical Exam  HENT:      Head: Normocephalic and atraumatic.      Mouth/Throat:      Mouth: Mucous membranes are moist.      Pharynx: Oropharynx is clear.   Eyes:      Extraocular Movements: Extraocular movements intact.      Conjunctiva/sclera: Conjunctivae normal.      Pupils: Pupils are equal, round, and reactive to light.   Cardiovascular:      Rate and Rhythm: Normal rate and regular rhythm.      Pulses: Normal pulses.      Heart sounds: Normal heart sounds.   Pulmonary:      Effort: Pulmonary effort is normal.   Abdominal:      General: Abdomen is flat. Bowel sounds are normal.      Palpations: Abdomen is soft.   Musculoskeletal:         General: Normal range of motion.      Cervical back: Normal range of motion.   Skin:     General: Skin is warm.      Capillary Refill: Capillary refill takes less than 2 seconds.   Neurological:      General: No focal deficit present.      Mental Status: He is alert and oriented to person, place, and time.       Significant Labs: All pertinent labs within the past 24 hours have been reviewed.    Significant Imaging: I have reviewed all pertinent imaging results/findings within the past 24 hours.      Assessment/Plan:      * Acute on chronic biventricular  congestive heart failure  - Continuous oxygen  - Continue bumetinide 1mg IV bid  - duonebs  - Cardiology consult  - ECHO pending  - telemetry  - continue metoprolol 50mg bid   - daily weights  - daily I+O  - low sodium diet  - dietary consult        GERD (gastroesophageal reflux disease)  - famotidine injection 20mg bID      Multiple wounds of skin  - has bilateral chronic lower extremity skin changes/wounds  - wound care consult       Depression  - continue buproprion      Cirrhosis  - continue rifaximin 550mg bid  - continue lactulose 20mg qid      HTN (hypertension)  - continue metoprolol 50mg bid      A-fib  - continue eliquis 2.5mg bid  - continue amiodarone 200mg bid  - telemetry      CKD (chronic kidney disease)  - bmp pending  - records say creatinine at baseline at 3.3 today at [facility]      COPD (chronic obstructive pulmonary disease)  - continual oxygen via [place]  - duonebs q6        VTE Risk Mitigation (From admission, onward)         Ordered     apixaban tablet 2.5 mg  2 times daily         05/09/22 1746     IP VTE HIGH RISK PATIENT  Once         05/09/22 1736     Place sequential compression device  Until discontinued         05/09/22 1736                Discharge Planning   DIONY:      Code Status: DNR   Is the patient medically ready for discharge?:     Reason for patient still in hospital (select all that apply): Treatment                     Rehmat LATOYA Solis MD  Department of Hospital Medicine   [facility] [place]

## 2022-05-10 NOTE — CONSULTS
[facility] [place]  Nephrology  Consult Note    Patient Name: Modesto Patrick  MRN: [id number]  Admission Date: 5/9/2022  Hospital Length of Stay: 1 days  Attending Provider: Neeta Solis MD   Primary Care Physician: Primary Doctor No  Principal Problem:Biventricular congestive heart failure    Consults  Subjective:     HPI: I have been following Mr. Proctor during his hospitalization at [facility].  He has biventricular heart failure, AFib, cirrhosis, and acute on chronic renal failure.  He has significant lower extremity and scrotal edema.  Albumin is low.  He has been receiving IV albumin and IV Bumex for the past 3 days.  Creatinine has risen from 2.8 to 3.7 during that time.      Past Medical History:   Diagnosis Date    AAA (abdominal aortic aneurysm)     s/p repair    Anxiety disorder, unspecified     CHF (congestive heart failure)     Biventricular HF    CKD (chronic kidney disease)     COPD (chronic obstructive pulmonary disease)     Current use of long term anticoagulation     DVT (deep venous thrombosis)     GERD (gastroesophageal reflux disease)     HLD (hyperlipidemia)     HTN (hypertension)     Paroxysmal atrial fibrillation     Unspecified cirrhosis of liver     Unspecified cirrhosis of liver        Past Surgical History:   Procedure Laterality Date    atortic aneu      REPAIR OF ABDOMINAL AORTIC ANEURYSM         Review of patient's allergies indicates:   Allergen Reactions    Lasix [furosemide]      Current Facility-Administered Medications   Medication Frequency    acetaminophen tablet 650 mg Q4H PRN    albuterol-ipratropium 2.5 mg-0.5 mg/3 mL nebulizer solution 3 mL Q6H    amiodarone tablet 200 mg BID    apixaban tablet 2.5 mg BID    bumetanide injection 1 mg BID    buPROPion tablet 75 mg BID    calamine-zinc oxide 8-8% solution QID PRN    famotidine tablet 20 mg BID    hydrALAZINE injection 10 mg Q6H PRN    lactulose 20 gram/30 mL solution Soln 20 g QID     melatonin tablet 6 mg Nightly PRN    metoprolol tartrate (LOPRESSOR) tablet 50 mg BID    mupirocin 2 % ointment BID    ondansetron tablet 8 mg Q8H PRN    polyethylene glycol packet 17 g Daily    prochlorperazine injection Soln 5 mg Q6H PRN    QUEtiapine tablet 100 mg BID    rifAXIMin tablet 550 mg BID     Family History       Problem Relation (Age of Onset)    No Known Problems Mother, Father          Tobacco Use    Smoking status: Current Every Day Smoker     Packs/day: 1.00     Types: Cigarettes    Smokeless tobacco: Never Used   Substance and Sexual Activity    Alcohol use: Not Currently    Drug use: Never    Sexual activity: Not Currently     Review of Systems   Constitutional:  Positive for fatigue.   HENT: Negative.     Respiratory:  Positive for shortness of breath.    Cardiovascular:  Positive for palpitations and leg swelling. Negative for chest pain.   Gastrointestinal:  Positive for constipation.   Genitourinary:  Positive for scrotal swelling. Negative for dysuria.   Objective:     Vital Signs (Most Recent):  Temp: 98.7 °F (37.1 °C) (05/10/22 0800)  Pulse: (!) 121 (05/10/22 0800)  Resp: 20 (05/10/22 0800)  BP: 92/76 (05/10/22 0800)  SpO2: (!) 94 % (05/10/22 0800)  O2 Device (Oxygen Therapy): nasal cannula (05/10/22 0738)   Vital Signs (24h Range):  Temp:  [98.1 °F (36.7 °C)-98.7 °F (37.1 °C)] 98.7 °F (37.1 °C)  Pulse:  [] 121  Resp:  [18-20] 20  SpO2:  [92 %-100 %] 94 %  BP: (80-92)/(55-76) 92/76     Weight: 124.6 kg (274 lb 11.1 oz) (05/10/22 0500)  Body mass index is 34.33 kg/m².  Body surface area is 2.57 meters squared.    I/O last 3 completed shifts:  In: 200 [P.O.:200]  Out: 550 [Urine:550]    Physical Exam  Eyes:      Pupils: Pupils are equal, round, and reactive to light.   Cardiovascular:      Rate and Rhythm: Tachycardia present. Rhythm irregular.   Pulmonary:      Breath sounds: No wheezing or rales.   Abdominal:      Tenderness: There is no abdominal tenderness.    Genitourinary:     Comments: Scrotal swelling  Musculoskeletal:      Cervical back: Neck supple.      Right lower leg: Edema present.      Left lower leg: Edema present.   Neurological:      Mental Status: He is alert.       Significant Labs:  BMP:   Recent Labs   Lab 05/10/22  0342         K 5.4*      CO2 31   BUN 73*   CREATININE 3.67*   CALCIUM 8.7       Significant Imaging:      Assessment/Plan:     * Acute on chronic biventricular congestive heart failure    R and L ventricular failure with cirrhosis    Renal failure (ARF), acute on chronic  Creatinine has increased with attempts at diuresis. If it rises further,diuretic should be decreased or albumin resumed. He has significant RV dysfunction and elevated R sided pressures. Monitor I/O    HTN (hypertension)  BP borderline low    A-fib  Chronic Afib. Tends to get hypotensive with treatment with beta blocker or Ca channel blocker    CKD (chronic kidney disease)  CKD 3        Thank you for your consult.     Jaiden Grant MD  Nephrology  [facility]

## 2022-05-10 NOTE — PLAN OF CARE
Problem: Gas Exchange Impaired  Goal: Optimal Gas Exchange  5/10/2022 0115 by Jillian Dixon CRT  Outcome: Ongoing, Progressing  5/9/2022 2011 by Jillian Dixon, CRT  Outcome: Ongoing, Progressing

## 2022-05-10 NOTE — CONSULTS
[facility]  Wound Care  Progress Note    Patient Name: Modesto Patrick  MRN: [id number]  Admission Date: 5/9/2022  Attending Physician: Neeta Solis MD    Location of Wounds:  Wounds and edema BLE    Past Medical History:   Diagnosis Date    AAA (abdominal aortic aneurysm)     s/p repair    Anxiety disorder, unspecified     CHF (congestive heart failure)     Biventricular HF    CKD (chronic kidney disease)     COPD (chronic obstructive pulmonary disease)     Current use of long term anticoagulation     DVT (deep venous thrombosis)     GERD (gastroesophageal reflux disease)     HLD (hyperlipidemia)     HTN (hypertension)     Paroxysmal atrial fibrillation     Unspecified cirrhosis of liver     Unspecified cirrhosis of liver         Subjective:     HPI:  Modesto Patrick is a 61 y.o. male with wounds to bilateral lower extremities.He has redness to perineum and small laceration on penis. He is confused today during exam and unable to answer questions. He was admitted due to congestive heart failure. Past medical history significant for CHF, paroxysmal afib, chronic anticoagulation of eliquis, HTN, HLD, AAA repair, HLD, COPD on home oxygen, CKD stage III, cirrhosis and GERD.  Pertinent factors that delay wound healing include multiple co-morbidities, poor vascular supply, edema, fragile skin and no protective sensation.     Review of Systems   Unable to perform ROS: Other (patient confused)     Objective:     Vital Signs (Most Recent):  Temp: 98.2 °F (36.8 °C) (05/10/22 1200)  Pulse: (!) 113 (05/10/22 1224)  Resp: 20 (05/10/22 1224)  BP: 90/65 (05/10/22 1200)  SpO2: (!) 91 % (05/10/22 1224) Vital Signs (24h Range):  Temp:  [98.1 °F (36.7 °C)-98.7 °F (37.1 °C)] 98.2 °F (36.8 °C)  Pulse:  [] 113  Resp:  [18-20] 20  SpO2:  [91 %-100 %] 91 %  BP: (80-92)/(55-76) 90/65     Weight: 124.6 kg (274 lb 11.1 oz)  Body mass index is 34.33 kg/m².  No data recorded    Physical  Exam  Vitals reviewed.   HENT:      Head: Normocephalic.   Cardiovascular:      Rate and Rhythm: Normal rate and regular rhythm.      Pulses: Normal pulses.      Heart sounds: Normal heart sounds.   Pulmonary:      Effort: Pulmonary effort is normal.      Breath sounds: Normal breath sounds.   Skin:     Capillary Refill: Capillary refill takes 2 to 3 seconds.      Findings: Erythema and rash present.      Comments: Wound, see wound assessment and pictures   Neurological:      Mental Status: He is alert. Mental status is at baseline.      Motor: Weakness present.            Assessment and Plan      Wound Assessment:  Venous ulcers  Venous statis dermatitis   Incontinence Dermatits    Wound Care Plan:   Venous ulcers  Clean wound with baby shampoo and water or vashe   Apply sensicare around wound   Apply silvadene to open wounds   Cover and secure with 4x4s, mauricio, and paper tape   Change daily  Venous statis dermatitis  Apply aloe vesta antifungal to BLE  Incontinence Dermatitis  Apply sensicare and miconazole to perineum/penis twice daily          Signature:  RENEE Lofton  Wound Care    Date of encounter: 05/10/2022

## 2022-05-10 NOTE — PROGRESS NOTES
Pharmacist Renal Dose Adjustment Note    Modesto Patrick is a 61 y.o. male being treated with the medication famotidine    Patient Data:    Vital Signs (Most Recent):  Temp: 98.2 °F (36.8 °C) (05/10/22 1200)  Pulse: (!) 113 (05/10/22 1224)  Resp: 20 (05/10/22 1224)  BP: 90/65 (05/10/22 1200)  SpO2: (!) 91 % (05/10/22 1224)   Vital Signs (72h Range):  Temp:  [98.1 °F (36.7 °C)-98.7 °F (37.1 °C)]   Pulse:  []   Resp:  [18-20]   BP: (80-92)/(55-76)   SpO2:  [91 %-100 %]      Recent Labs   Lab 05/10/22  0342   CREATININE 3.67*     Serum creatinine: 3.67 mg/dL (H) 05/10/22 0342  Estimated creatinine clearance: 29.3 mL/min (A)    Medication:famotidine dose: 20 mg frequency bid will be changed to medication:famotidine dose:20 mg frequency:daily    Pharmacist's Name: Hayley Fitzpatrick  Pharmacist's Extension: 1690

## 2022-05-10 NOTE — ASSESSMENT & PLAN NOTE
Creatinine has increased with attempts at diuresis. If it rises further,diuretic should be decreased or albumin resumed. He has significant RV dysfunction and elevated R sided pressures. Monitor I/O

## 2022-05-10 NOTE — NURSING
Lying in bed, alert, resp even, NADN or voiced,generalized edema noted, BLE edema 4+, O2 @ 4L per nasal cannula,amor patent and draining, R wrist int intact, safety measures on going, bed down, cb near, bed alarm activated, denies wants/needs

## 2022-05-10 NOTE — SUBJECTIVE & OBJECTIVE
Past Medical History:   Diagnosis Date    AAA (abdominal aortic aneurysm)     s/p repair    Anxiety disorder, unspecified     CHF (congestive heart failure)     Biventricular HF    CKD (chronic kidney disease)     COPD (chronic obstructive pulmonary disease)     Current use of long term anticoagulation     DVT (deep venous thrombosis)     GERD (gastroesophageal reflux disease)     HLD (hyperlipidemia)     HTN (hypertension)     Paroxysmal atrial fibrillation     Unspecified cirrhosis of liver     Unspecified cirrhosis of liver        Past Surgical History:   Procedure Laterality Date    atortic aneu      REPAIR OF ABDOMINAL AORTIC ANEURYSM         Review of patient's allergies indicates:   Allergen Reactions    Lasix [furosemide]      Current Facility-Administered Medications   Medication Frequency    acetaminophen tablet 650 mg Q4H PRN    albuterol-ipratropium 2.5 mg-0.5 mg/3 mL nebulizer solution 3 mL Q6H    amiodarone tablet 200 mg BID    apixaban tablet 2.5 mg BID    bumetanide injection 1 mg BID    buPROPion tablet 75 mg BID    calamine-zinc oxide 8-8% solution QID PRN    famotidine tablet 20 mg BID    hydrALAZINE injection 10 mg Q6H PRN    lactulose 20 gram/30 mL solution Soln 20 g QID    melatonin tablet 6 mg Nightly PRN    metoprolol tartrate (LOPRESSOR) tablet 50 mg BID    mupirocin 2 % ointment BID    ondansetron tablet 8 mg Q8H PRN    polyethylene glycol packet 17 g Daily    prochlorperazine injection Soln 5 mg Q6H PRN    QUEtiapine tablet 100 mg BID    rifAXIMin tablet 550 mg BID     Family History       Problem Relation (Age of Onset)    No Known Problems Mother, Father          Tobacco Use    Smoking status: Current Every Day Smoker     Packs/day: 1.00     Types: Cigarettes    Smokeless tobacco: Never Used   Substance and Sexual Activity    Alcohol use: Not Currently    Drug use: Never    Sexual activity: Not Currently     Review of Systems   Constitutional:  Positive for fatigue.   HENT: Negative.      Respiratory:  Positive for shortness of breath.    Cardiovascular:  Positive for palpitations and leg swelling. Negative for chest pain.   Gastrointestinal:  Positive for constipation.   Genitourinary:  Positive for scrotal swelling. Negative for dysuria.   Objective:     Vital Signs (Most Recent):  Temp: 98.7 °F (37.1 °C) (05/10/22 0800)  Pulse: (!) 121 (05/10/22 0800)  Resp: 20 (05/10/22 0800)  BP: 92/76 (05/10/22 0800)  SpO2: (!) 94 % (05/10/22 0800)  O2 Device (Oxygen Therapy): nasal cannula (05/10/22 0773)   Vital Signs (24h Range):  Temp:  [98.1 °F (36.7 °C)-98.7 °F (37.1 °C)] 98.7 °F (37.1 °C)  Pulse:  [] 121  Resp:  [18-20] 20  SpO2:  [92 %-100 %] 94 %  BP: (80-92)/(55-76) 92/76     Weight: 124.6 kg (274 lb 11.1 oz) (05/10/22 0500)  Body mass index is 34.33 kg/m².  Body surface area is 2.57 meters squared.    I/O last 3 completed shifts:  In: 200 [P.O.:200]  Out: 550 [Urine:550]    Physical Exam  Eyes:      Pupils: Pupils are equal, round, and reactive to light.   Cardiovascular:      Rate and Rhythm: Tachycardia present. Rhythm irregular.   Pulmonary:      Breath sounds: No wheezing or rales.   Abdominal:      Tenderness: There is no abdominal tenderness.   Genitourinary:     Comments: Scrotal swelling  Musculoskeletal:      Cervical back: Neck supple.      Right lower leg: Edema present.      Left lower leg: Edema present.   Neurological:      Mental Status: He is alert.       Significant Labs:  BMP:   Recent Labs   Lab 05/10/22  0342         K 5.4*      CO2 31   BUN 73*   CREATININE 3.67*   CALCIUM 8.7       Significant Imaging:

## 2022-05-10 NOTE — PT/OT/SLP EVAL
Physical Therapy Evaluation    Patient Name:  Modesto Patrick   MRN:  [id number]    Recommendations:     Discharge Recommendations:  nursing facility, skilled, rehabilitation facility   Discharge Equipment Recommendations: other (see comments) (to be determined)   Barriers to discharge: Inaccessible home and Decreased caregiver support    Assessment:     Modesto Patrick is a 61 y.o. male admitted with a medical diagnosis of Biventricular congestive heart failure.  He presents with the following impairments/functional limitations:  weakness, impaired functional mobilty, pain, decreased lower extremity function, impaired balance, impaired cognition, edema, impaired skin Pt is confused and a poor historian. His functional baseline is uncertain at this time. However, he required maximum assistance with all mobility and was resistant to movement of BLE. He has BLE edema and is painful to touch. PT to assess functional potential with 1 week trial.    Rehab Prognosis: Fair; patient would benefit from acute skilled PT services to address these deficits and reach maximum level of function.    Recent Surgery: * No surgery found *      Plan:     During this hospitalization, patient to be seen 5 x/week to address the identified rehab impairments via gait training, therapeutic activities, therapeutic exercises and progress toward the following goals:    · Plan of Care Expires:  05/17/22    Subjective     Chief Complaint: CHF  Patient/Family Comments/goals: Pt was confused and slightly aggressive with PT  Pain/Comfort:  · Pain Rating 1: 0/10 (up to 7/10)  · Location - Side 1: Bilateral  · Location - Orientation 1: lower  · Location 1: leg  · Pain Addressed 1: Cessation of Activity  · Pain Rating Post-Intervention 1: 3/10    Patients cultural, spiritual, Sikhism conflicts given the current situation: no    Living Environment:  Pt lives at home with his sister  Prior to admission, patients level of function was uncertain.   Equipment used at home: oxygen.  DME owned (not currently used): none.  Upon discharge, patient will have assistance from facility staff.    Objective:     Communicated with HANY Sweet RN prior to session.  Patient found supine with telemetry, peripheral IV  upon PT entry to room.    General Precautions: Standard, fall   Orthopedic Precautions:N/A   Braces: N/A  Respiratory Status: Room air    Exams:  · Cognitive Exam:  Patient is oriented to Person  · Gross Motor Coordination:  WFL  · Skin Integrity/Edema:      · -       BLE edema, left hand multiple finger amputations  · RLE ROM: WFL  · RLE Strength: 2/5  · LLE ROM: WFL  · LLE Strength: 2/5    Functional Mobility:  · Bed Mobility:     · Rolling Left:  maximal assistance  · Rolling Right: maximal assistance  · Supine to Sit: maximal assistance and of 2 persons  · Sit to Supine: maximal assistance and of 2 persons  · Balance: poor    Therapeutic Activities and Exercises:  ·  Pt educated on PT role/POC.   · Importance of OOB activity with staff assistance.  · Importance of sitting up in the chair throughout the day as tolerated, especially for meals   · Safety during functional t/f and mobility with use of rolling walker   · Multiple self-care tasks/functional mobility completed- assistance level noted above   · All questions/concerns answered within PT scope of practice      AM-PAC 6 CLICK MOBILITY  Total Score:7     Patient left supine with all lines intact and call button in reach.    GOALS:   Multidisciplinary Problems     Physical Therapy Goals        Problem: Physical Therapy    Goal Priority Disciplines Outcome Goal Variances Interventions   Physical Therapy Goal     PT, PT/OT Ongoing, Progressing     Description: Short term goals to be met by 5/24/22:  1. Supine to sit with MInimal Assistance  2. Sit to stand transfer with Moderate Assistance  3. Bed to chair transfer with Moderate Assistance using Rolling Walker    Pt to be seen for 1 week trial to assess  functional potential                      History:     Past Medical History:   Diagnosis Date    AAA (abdominal aortic aneurysm)     s/p repair    Anxiety disorder, unspecified     CHF (congestive heart failure)     Biventricular HF    CKD (chronic kidney disease)     COPD (chronic obstructive pulmonary disease)     Current use of long term anticoagulation     DVT (deep venous thrombosis)     GERD (gastroesophageal reflux disease)     HLD (hyperlipidemia)     HTN (hypertension)     Paroxysmal atrial fibrillation     Unspecified cirrhosis of liver     Unspecified cirrhosis of liver        Past Surgical History:   Procedure Laterality Date    atortic aneu      REPAIR OF ABDOMINAL AORTIC ANEURYSM         Time Tracking:     PT Received On: 05/10/22  PT Start Time: 1300     PT Stop Time: 1325  PT Total Time (min): 25 min     Billable Minutes: Evaluation moderate complexity      05/10/2022

## 2022-05-10 NOTE — PLAN OF CARE
[facility] [place]  Initial Discharge Assessment       Primary Care Provider: Primary Doctor No    Admission Diagnosis: CHF (congestive heart failure) [I50.9]  CHF (congestive heart failure) [I50.9]  CHF (congestive heart failure) [I50.9]    Admission Date: 5/9/2022  Expected Discharge Date:     Discharge Barriers Identified: None    Payor: MEDICARE / Plan: MEDICARE PART A & B / Product Type: Government /     Extended Emergency Contact Information  Primary Emergency Contact: Eber Patrick  Mobile Phone: [phone]  Relation: Sister    Discharge Plan A: Home with family, Home Health  Discharge Plan B: Skilled Nursing Facility    No Pharmacies Listed    Initial Assessment (most recent)     Adult Discharge Assessment - 05/10/22 1523        Discharge Assessment    Assessment Type Discharge Planning Assessment     Source of Information family     If unable to respond/provide information was family/caregiver contacted? Yes     Contact Name/Number Eber cabral [phone]     Communicated DIONY with patient/caregiver Date not available/Unable to determine     Lives With sibling(s)     Do you expect to return to your current living situation? Yes     Do you have help at home or someone to help you manage your care at home? Yes     Who are your caregiver(s) and their phone number(s)? Eber cabral     Prior to hospitilization cognitive status: Unable to Assess     Current cognitive status: Not Oriented to Place     Home Layout Able to live on 1st floor     Equipment Currently Used at Home oxygen     Patient currently being followed by outpatient case management? No     Do you currently have service(s) that help you manage your care at home? Yes     Name and Contact number of agency [facility]     Is the pt/caregiver preference to resume services with current agency Yes     How do you get to doctors appointments? family or friend will provide     Are you on dialysis? No     Discharge Plan A Home  with family;Home Health     Discharge Plan B Skilled Nursing Facility     DME Needed Upon Discharge  none     Discharge Plan discussed with: Sibling     Discharge Barriers Identified None               SS spoke with pt and he was confused. SS spoke with pt's sister and pt lives at home with her. Pt current with [facility]. Password set and is [id number]. Informed of IDT meeting and would like to attend. SS will follow

## 2022-05-10 NOTE — H&P
[facility] Medicine  History & Physical    Patient Name: Modesto Patrick  MRN: [id number]  Patient Class: IP- Inpatient  Admission Date: 5/9/2022  Attending Physician: Neeta Solis MD   Primary Care Provider: Primary Doctor No         Patient information was obtained from patient, past medical records and ER records.     Subjective:     Principal Problem:Biventricular congestive heart failure    Chief Complaint: No chief complaint on file.       HPI: The patient is a 60yo AA male with a MedHX of CHF, paroxysmal afib, chronic anticoagulation of eliquis, HTN, HLD, AAA repair, HLD, COPD on home oxygen, CKD stage III, cirrhosis and GERD who presents to LTAC for long-term care of his CHF exacerbation and acute on chronic renal failure. The patient had originally been admitted to [facility] for CHF exacerbation on 04/27, where he was also found to have afib with RVR and heme positive stools. His Eliquis was held and GI saw him and did not recommend any inpatient GI workup. He was started back on his Eliquis and IV diuresis with bumex. This was stopped after his creatinine was found to be elevated above baseline. He was started back on diuretics after his volume status was found to still be decompensated. The patient's home metoprolol dose was also increased while at [facility], and his home losartan, was held.    The patient was found to be stable at [facility] and was accepted to LTAC for further monitoring/care by Dr. Solis for his acute on chronic CHF exacerbation.      Past Medical History:   Diagnosis Date    AAA (abdominal aortic aneurysm)     s/p repair    Anxiety disorder, unspecified     CHF (congestive heart failure)     Biventricular HF    CKD (chronic kidney disease)     COPD (chronic obstructive pulmonary disease)     Current use of long term anticoagulation     DVT (deep venous thrombosis)     GERD (gastroesophageal reflux disease)     HLD  (hyperlipidemia)     HTN (hypertension)     Paroxysmal atrial fibrillation     Unspecified cirrhosis of liver     Unspecified cirrhosis of liver        Past Surgical History:   Procedure Laterality Date    atortic aneu      REPAIR OF ABDOMINAL AORTIC ANEURYSM         Review of patient's allergies indicates:   Allergen Reactions    Lasix [furosemide]        No current facility-administered medications on file prior to encounter.     Current Outpatient Medications on File Prior to Encounter   Medication Sig    amiodarone (PACERONE) 200 MG Tab Take 200 mg by mouth 2 (two) times a day.    bumetanide (BUMEX) 0.5 MG Tab Take 2 mg by mouth once daily.    buPROPion (WELLBUTRIN) 75 MG tablet Take 75 mg by mouth 2 (two) times daily.    carvediloL (COREG) 6.25 MG tablet Take 6.25 mg by mouth 2 (two) times daily.    metOLazone (ZAROXOLYN) 2.5 MG tablet Take 2.5 mg by mouth once daily.    QUEtiapine (SEROQUEL) 100 MG Tab Take 100 mg by mouth 2 (two) times a day.    valsartan (DIOVAN) 40 MG tablet Take 40 mg by mouth 2 (two) times daily.    lactulose (CEPHULAC) 20 gram Pack Take 20 g by mouth 4 (four) times daily.    rifAXImin (XIFAXAN) 200 mg Tab Take 550 mg by mouth 2 (two) times daily.     Family History       Problem Relation (Age of Onset)    No Known Problems Mother, Father          Tobacco Use    Smoking status: Current Every Day Smoker     Packs/day: 1.00     Types: Cigarettes    Smokeless tobacco: Never Used   Substance and Sexual Activity    Alcohol use: Not Currently    Drug use: Never    Sexual activity: Not Currently     Review of Systems   Constitutional:  Negative for activity change, appetite change, chills, diaphoresis, fatigue and fever.   Respiratory:  Positive for shortness of breath.    Cardiovascular:  Positive for leg swelling. Negative for chest pain.   Gastrointestinal:  Negative for nausea and vomiting.   Genitourinary:  Positive for scrotal swelling.   Skin:  Positive for rash and  wound.   Neurological:  Negative for weakness and numbness.   Psychiatric/Behavioral:  Negative for confusion.    Objective:     Vital Signs (Most Recent):  Temp: 98.5 °F (36.9 °C) (05/09/22 1804)  Pulse: (!) 11 (05/09/22 1804)  Resp: 20 (05/09/22 1804)  BP: (!) 80/57 (05/09/22 1804)   Vital Signs (24h Range):  Temp:  [98.5 °F (36.9 °C)] 98.5 °F (36.9 °C)  Pulse:  [11] 11  Resp:  [20] 20  BP: (80)/(57) 80/57     Weight: 125 kg (275 lb 9.2 oz)  Body mass index is 34.44 kg/m².    Physical Exam  HENT:      Head: Normocephalic and atraumatic.      Mouth/Throat:      Mouth: Mucous membranes are moist.      Pharynx: Oropharynx is clear.   Eyes:      Extraocular Movements: Extraocular movements intact.      Conjunctiva/sclera: Conjunctivae normal.      Pupils: Pupils are equal, round, and reactive to light.   Cardiovascular:      Rate and Rhythm: Normal rate and regular rhythm.      Pulses: Normal pulses.      Heart sounds: Normal heart sounds.   Pulmonary:      Effort: Pulmonary effort is normal.      Breath sounds: Wheezing present.   Abdominal:      General: Abdomen is flat. Bowel sounds are normal.      Palpations: Abdomen is soft.   Musculoskeletal:         General: Normal range of motion.      Cervical back: Normal range of motion.   Skin:     General: Skin is warm.      Capillary Refill: Capillary refill takes less than 2 seconds.   Neurological:      General: No focal deficit present.      Mental Status: He is alert and oriented to person, place, and time.         CRANIAL NERVES     CN III, IV, VI   Pupils are equal, round, and reactive to light.     Significant Labs: All pertinent labs within the past 24 hours have been reviewed.    Significant Imaging: I have reviewed all pertinent imaging results/findings within the past 24 hours.    Assessment/Plan:     * Acute on chronic biventricular congestive heart failure  - Continuous oxygen  - Continue bumetinide 1mg IV bid  - duonebs  - Cardiology consult  - ECHO  pending  - telemetry  - continue metoprolol 50mg bid   - daily weights  - daily I+O  - low sodium diet  - dietary consult        A-fib  - continue eliquis 2.5mg bid  - continue amiodarone 200mg bid  - telemetry      COPD (chronic obstructive pulmonary disease)  - continual oxygen via NC  - duonebs q6      CKD (chronic kidney disease)  - bmp pending  - records say creatinine at baseline at 3.3 today at [facility]      GERD (gastroesophageal reflux disease)  - famotidine injection 20mg bID      Multiple wounds of skin  - has bilateral chronic lower extremity skin changes/wounds  - wound care consult       Depression  - continue buproprion      Cirrhosis  - continue rifaximin 550mg bid  - continue lactulose 20mg qid      HTN (hypertension)  - continue metoprolol 50mg bid        VTE Risk Mitigation (From admission, onward)         Ordered     apixaban tablet 2.5 mg  2 times daily         05/09/22 1746     IP VTE HIGH RISK PATIENT  Once         05/09/22 1736     Place sequential compression device  Until discontinued         05/09/22 1736                   Saskia Cortez MD  Department of Hospital Medicine   [facility] [place]

## 2022-05-10 NOTE — PLAN OF CARE
Problem: Occupational Therapy  Goal: Occupational Therapy Goal  Description: Pt is getting a 1 week trial to determine pt's ability to participate and progress with OT services.  STG:  Pt will perform grooming with setup  Pt will bathe with min (A) for upper body anterior  Pt will perform UE dressing with setup and min(A)  Pt will sit EOB x 7 min with CGA/ min(A)  Pt will tolerate 20 minutes of tx without fatigue      LT.Restore to max I with self care and mobility.     Outcome: Ongoing, Progressing   Pt is getting a 1 week trial to determine rehab potential.

## 2022-05-10 NOTE — PROGRESS NOTES
Wound care note;  Patient new admit, skin was evaluated today with TREVIN DURAN.  Patient in bed, alert, confused at present   Sacral crease with tan linear line, moist , open to air   Bilateral heels with out pressure injury noted.  Scrotum/penis edematous , has small ulceration to penis tip. Keep scrotum elevated on towels.  Bilateral flank with pinpoint red dry lesions noted, open to air.  Right anterior knee with dry , crusty abrasion noted , open to air   Bilateral lower legs with dry, thick brown raised scattered areas, open to air. Applied protective cream   Kael of redness noted to anterior lower legs  Has pink , dark ulceration to Left dorsal foot   Right lateral lower leg ulceration with pink, superficial tissue   Both left foot and right leg to start silvadene moist gauze in am.  Left hand has 4 fingers previous amputated .  Left elbow wound with pale pink tissue , dry, applied Aquacel foam border.  Recommend turn protocol  Waffle boots/pillows to offload heels  On low air loss mattress   Wound care team to follow

## 2022-05-10 NOTE — HPI
I have been following Mr. Patrick during his hospitalization at [facility].  He has biventricular heart failure, AFib, cirrhosis, and acute on chronic renal failure.  He has significant lower extremity and scrotal edema.  Albumin is low.  He has been receiving IV albumin and IV Bumex for the past 3 days.  Creatinine has risen from 2.8 to 3.7 during that time.

## 2022-05-10 NOTE — PLAN OF CARE
Problem: Physical Therapy  Goal: Physical Therapy Goal  Description: Short term goals to be met by 5/17/22:  1. Supine to sit with MInimal Assistance  2. Sit to stand transfer with Moderate Assistance  3. Bed to chair transfer with Moderate Assistance using Rolling Walker    Pt to be seen for 1 week trial to assess functional potential  Outcome: Ongoing, Progressing

## 2022-05-11 LAB
ANION GAP SERPL CALCULATED.3IONS-SCNC: 15 MMOL/L (ref 7–16)
BUN SERPL-MCNC: 90 MG/DL (ref 7–18)
BUN/CREAT SERPL: 24 (ref 6–20)
CALCIUM SERPL-MCNC: 9.4 MG/DL (ref 8.5–10.1)
CHLORIDE SERPL-SCNC: 101 MMOL/L (ref 98–107)
CO2 SERPL-SCNC: 31 MMOL/L (ref 21–32)
CREAT SERPL-MCNC: 3.76 MG/DL (ref 0.7–1.3)
GLUCOSE SERPL-MCNC: 111 MG/DL (ref 74–106)
LACTATE SERPL-SCNC: 0.8 MMOL/L (ref 0.4–2)
NT-PROBNP SERPL-MCNC: ABNORMAL PG/ML (ref 1–125)
POTASSIUM SERPL-SCNC: 5.4 MMOL/L (ref 3.5–5.1)
SODIUM SERPL-SCNC: 142 MMOL/L (ref 136–145)
TROPONIN I SERPL HS-MCNC: 24.6 PG/ML
TROPONIN I SERPL HS-MCNC: 27.4 PG/ML
TROPONIN I SERPL HS-MCNC: 28.7 PG/ML

## 2022-05-11 PROCEDURE — 99223 PR INITIAL HOSPITAL CARE,LEVL III: ICD-10-PCS | Mod: ,,, | Performed by: STUDENT IN AN ORGANIZED HEALTH CARE EDUCATION/TRAINING PROGRAM

## 2022-05-11 PROCEDURE — 93005 ELECTROCARDIOGRAM TRACING: CPT

## 2022-05-11 PROCEDURE — 84484 ASSAY OF TROPONIN QUANT: CPT | Performed by: NURSE PRACTITIONER

## 2022-05-11 PROCEDURE — 99222 PR INITIAL HOSPITAL CARE,LEVL II: ICD-10-PCS | Mod: ,,, | Performed by: INTERNAL MEDICINE

## 2022-05-11 PROCEDURE — 25000003 PHARM REV CODE 250: Performed by: INTERNAL MEDICINE

## 2022-05-11 PROCEDURE — 27000221 HC OXYGEN, UP TO 24 HOURS

## 2022-05-11 PROCEDURE — 25000242 PHARM REV CODE 250 ALT 637 W/ HCPCS

## 2022-05-11 PROCEDURE — 83605 ASSAY OF LACTIC ACID: CPT | Performed by: STUDENT IN AN ORGANIZED HEALTH CARE EDUCATION/TRAINING PROGRAM

## 2022-05-11 PROCEDURE — 63600175 PHARM REV CODE 636 W HCPCS: Performed by: INTERNAL MEDICINE

## 2022-05-11 PROCEDURE — 63600175 PHARM REV CODE 636 W HCPCS: Performed by: NURSE PRACTITIONER

## 2022-05-11 PROCEDURE — 25000003 PHARM REV CODE 250

## 2022-05-11 PROCEDURE — 80048 BASIC METABOLIC PNL TOTAL CA: CPT | Performed by: NURSE PRACTITIONER

## 2022-05-11 PROCEDURE — 25000003 PHARM REV CODE 250: Performed by: NURSE PRACTITIONER

## 2022-05-11 PROCEDURE — 94640 AIRWAY INHALATION TREATMENT: CPT

## 2022-05-11 PROCEDURE — 36415 COLL VENOUS BLD VENIPUNCTURE: CPT | Performed by: NURSE PRACTITIONER

## 2022-05-11 PROCEDURE — 99222 1ST HOSP IP/OBS MODERATE 55: CPT | Mod: ,,, | Performed by: INTERNAL MEDICINE

## 2022-05-11 PROCEDURE — 93010 ELECTROCARDIOGRAM REPORT: CPT | Mod: ,,, | Performed by: STUDENT IN AN ORGANIZED HEALTH CARE EDUCATION/TRAINING PROGRAM

## 2022-05-11 PROCEDURE — 83880 ASSAY OF NATRIURETIC PEPTIDE: CPT | Performed by: STUDENT IN AN ORGANIZED HEALTH CARE EDUCATION/TRAINING PROGRAM

## 2022-05-11 PROCEDURE — 99223 1ST HOSP IP/OBS HIGH 75: CPT | Mod: ,,, | Performed by: STUDENT IN AN ORGANIZED HEALTH CARE EDUCATION/TRAINING PROGRAM

## 2022-05-11 PROCEDURE — 93010 EKG 12-LEAD: ICD-10-PCS | Mod: ,,, | Performed by: STUDENT IN AN ORGANIZED HEALTH CARE EDUCATION/TRAINING PROGRAM

## 2022-05-11 PROCEDURE — 82310 ASSAY OF CALCIUM: CPT | Performed by: NURSE PRACTITIONER

## 2022-05-11 PROCEDURE — 11000008

## 2022-05-11 PROCEDURE — 84484 ASSAY OF TROPONIN QUANT: CPT | Performed by: STUDENT IN AN ORGANIZED HEALTH CARE EDUCATION/TRAINING PROGRAM

## 2022-05-11 PROCEDURE — 99233 PR SUBSEQUENT HOSPITAL CARE,LEVL III: ICD-10-PCS | Mod: ,,, | Performed by: INTERNAL MEDICINE

## 2022-05-11 PROCEDURE — 63600150 PHARM REV CODE 636: Performed by: STUDENT IN AN ORGANIZED HEALTH CARE EDUCATION/TRAINING PROGRAM

## 2022-05-11 PROCEDURE — 94761 N-INVAS EAR/PLS OXIMETRY MLT: CPT

## 2022-05-11 PROCEDURE — 99233 SBSQ HOSP IP/OBS HIGH 50: CPT | Mod: ,,, | Performed by: INTERNAL MEDICINE

## 2022-05-11 PROCEDURE — 36415 COLL VENOUS BLD VENIPUNCTURE: CPT | Performed by: STUDENT IN AN ORGANIZED HEALTH CARE EDUCATION/TRAINING PROGRAM

## 2022-05-11 RX ORDER — METOLAZONE 5 MG/1
5 TABLET ORAL DAILY
Status: DISCONTINUED | OUTPATIENT
Start: 2022-05-11 | End: 2022-05-14

## 2022-05-11 RX ORDER — DIGOXIN 0.25 MG/ML
500 INJECTION INTRAMUSCULAR; INTRAVENOUS ONCE
Status: COMPLETED | OUTPATIENT
Start: 2022-05-11 | End: 2022-05-11

## 2022-05-11 RX ORDER — MIDODRINE HYDROCHLORIDE 2.5 MG/1
2.5 TABLET ORAL 2 TIMES DAILY WITH MEALS
Status: DISCONTINUED | OUTPATIENT
Start: 2022-05-11 | End: 2022-05-12

## 2022-05-11 RX ORDER — BUMETANIDE 0.25 MG/ML
2 INJECTION INTRAMUSCULAR; INTRAVENOUS 2 TIMES DAILY
Status: DISCONTINUED | OUTPATIENT
Start: 2022-05-11 | End: 2022-05-14

## 2022-05-11 RX ORDER — DOPAMINE HYDROCHLORIDE 320 MG/100ML
5 INJECTION, SOLUTION INTRAVENOUS CONTINUOUS
Status: DISCONTINUED | OUTPATIENT
Start: 2022-05-11 | End: 2022-05-16

## 2022-05-11 RX ADMIN — MUPIROCIN: 20 OINTMENT TOPICAL at 09:05

## 2022-05-11 RX ADMIN — AMIODARONE HYDROCHLORIDE 150 MG: 50 INJECTION, SOLUTION INTRAVENOUS at 05:05

## 2022-05-11 RX ADMIN — POLYETHYLENE GLYCOL 3350 17 G: 17 POWDER, FOR SOLUTION ORAL at 10:05

## 2022-05-11 RX ADMIN — AMIODARONE HYDROCHLORIDE 1 MG/MIN: 1.8 INJECTION, SOLUTION INTRAVENOUS at 05:05

## 2022-05-11 RX ADMIN — DIGOXIN 500 MCG: 0.25 INJECTION INTRAMUSCULAR; INTRAVENOUS at 05:05

## 2022-05-11 RX ADMIN — DOPAMINE HYDROCHLORIDE IN DEXTROSE 5 MCG/KG/MIN: 3.2 INJECTION, SOLUTION INTRAVENOUS at 10:05

## 2022-05-11 RX ADMIN — RIFAXIMIN 550 MG: 550 TABLET ORAL at 10:05

## 2022-05-11 RX ADMIN — METOPROLOL TARTRATE 50 MG: 50 TABLET, FILM COATED ORAL at 10:05

## 2022-05-11 RX ADMIN — APIXABAN 2.5 MG: 5 TABLET, FILM COATED ORAL at 09:05

## 2022-05-11 RX ADMIN — MUPIROCIN: 20 OINTMENT TOPICAL at 10:05

## 2022-05-11 RX ADMIN — FAMOTIDINE 20 MG: 20 TABLET ORAL at 10:05

## 2022-05-11 RX ADMIN — QUETIAPINE FUMARATE 100 MG: 100 TABLET ORAL at 09:05

## 2022-05-11 RX ADMIN — BUMETANIDE 1 MG: 0.25 INJECTION, SOLUTION INTRAMUSCULAR; INTRAVENOUS at 10:05

## 2022-05-11 RX ADMIN — SILVER SULFADIAZINE: 10 CREAM TOPICAL at 11:05

## 2022-05-11 RX ADMIN — RIFAXIMIN 550 MG: 550 TABLET ORAL at 09:05

## 2022-05-11 RX ADMIN — BUPROPION HYDROCHLORIDE 75 MG: 75 TABLET, FILM COATED ORAL at 09:05

## 2022-05-11 RX ADMIN — QUETIAPINE FUMARATE 100 MG: 100 TABLET ORAL at 10:05

## 2022-05-11 RX ADMIN — MICONAZOLE NITRATE 2 % TOPICAL POWDER: at 10:05

## 2022-05-11 RX ADMIN — LACTULOSE 20 G: 20 SOLUTION ORAL at 10:05

## 2022-05-11 RX ADMIN — AMIODARONE HYDROCHLORIDE 200 MG: 200 TABLET ORAL at 10:05

## 2022-05-11 RX ADMIN — METOLAZONE 5 MG: 5 TABLET ORAL at 09:05

## 2022-05-11 RX ADMIN — MICONAZOLE NITRATE 2 % TOPICAL POWDER: at 09:05

## 2022-05-11 RX ADMIN — LACTULOSE 20 G: 20 SOLUTION ORAL at 09:05

## 2022-05-11 RX ADMIN — BUPROPION HYDROCHLORIDE 75 MG: 75 TABLET, FILM COATED ORAL at 10:05

## 2022-05-11 RX ADMIN — METOPROLOL TARTRATE 50 MG: 50 TABLET, FILM COATED ORAL at 09:05

## 2022-05-11 RX ADMIN — IPRATROPIUM BROMIDE AND ALBUTEROL SULFATE 3 ML: 2.5; .5 SOLUTION RESPIRATORY (INHALATION) at 07:05

## 2022-05-11 RX ADMIN — IPRATROPIUM BROMIDE AND ALBUTEROL SULFATE 3 ML: 2.5; .5 SOLUTION RESPIRATORY (INHALATION) at 01:05

## 2022-05-11 RX ADMIN — APIXABAN 2.5 MG: 5 TABLET, FILM COATED ORAL at 10:05

## 2022-05-11 RX ADMIN — LACTULOSE 20 G: 20 SOLUTION ORAL at 03:05

## 2022-05-11 RX ADMIN — BUMETANIDE 2 MG: 0.25 INJECTION, SOLUTION INTRAMUSCULAR; INTRAVENOUS at 09:05

## 2022-05-11 RX ADMIN — MIDODRINE HYDROCHLORIDE 2.5 MG: 2.5 TABLET ORAL at 10:05

## 2022-05-11 RX ADMIN — IPRATROPIUM BROMIDE AND ALBUTEROL SULFATE 3 ML: 2.5; .5 SOLUTION RESPIRATORY (INHALATION) at 12:05

## 2022-05-11 RX ADMIN — MIDODRINE HYDROCHLORIDE 2.5 MG: 2.5 TABLET ORAL at 05:05

## 2022-05-11 RX ADMIN — LACTULOSE 20 G: 20 SOLUTION ORAL at 05:05

## 2022-05-11 NOTE — CONSULTS
[facility]  Pulmonology  Consult Note    Patient Name: Modesto Patrick  MRN: [id number]  Admission Date: 5/9/2022  Hospital Length of Stay: 2 days  Code Status: DNR  Attending Physician: Neeta Solis MD  Primary Care Provider: Primary Doctor No   Principal Problem: Biventricular congestive heart failure    Inpatient consult to Pulmonology  Consult performed by: Obi Donovan MD  Consult ordered by: RENEE Brown  Reason for consult: ICU  Assessment/Recommendations: - recommend oral amiodarone loading  - aggressive diuresis        Subjective:     HPI:  61-year-old black male with history of heart failure with reduced ejection fraction, chronic atrial fibrillation, CKD 3, and cirrhosis.  He was initially admitted to [facility] for heart failure exacerbation on 04/27 and discharged to [facility] on 05/09 for ongoing treatment.  Per notes and his hospital course here the patient has not been tolerating IV diuresis secondary to worsening renal function, despite using albumin in addition to his diuretics.  Nephrology has been consulted along with Cardiology.  Per verbal report cardiology has recommended an amiodarone infusion along with dopamine, this is unable to be accomplished, general medical floor in [facility] and therefore requesting ICU consultation.  Patient denies any current complaints denies any chest pain or palpitations.  He denies any shortness of breath.  He is grossly volume overloaded with diffuse anasarca and edema extending from the lower extremities to the anterior abdominal wall.  Left hand and arm swelling.  By the time of my evaluation the patient has already been transferred to the high observation unit under the care of Dr. Solis.       Past Medical History:   Diagnosis Date    AAA (abdominal aortic aneurysm)     s/p repair    Anxiety disorder, unspecified     CHF (congestive heart failure)     Biventricular HF    CKD  (chronic kidney disease)     COPD (chronic obstructive pulmonary disease)     Current use of long term anticoagulation     DVT (deep venous thrombosis)     GERD (gastroesophageal reflux disease)     HLD (hyperlipidemia)     HTN (hypertension)     Paroxysmal atrial fibrillation     Unspecified cirrhosis of liver     Unspecified cirrhosis of liver        Past Surgical History:   Procedure Laterality Date    atortic aneu      REPAIR OF ABDOMINAL AORTIC ANEURYSM         Review of patient's allergies indicates:   Allergen Reactions    Lasix [furosemide]        Family History       Problem Relation (Age of Onset)    No Known Problems Mother, Father          Tobacco Use    Smoking status: Current Every Day Smoker     Packs/day: 1.00     Types: Cigarettes    Smokeless tobacco: Never Used   Substance and Sexual Activity    Alcohol use: Not Currently    Drug use: Never    Sexual activity: Not Currently         Review of Systems   Constitutional:  Negative for appetite change, fatigue, fever and unexpected weight change.   HENT:  Negative for congestion, postnasal drip, rhinorrhea, sinus pressure and sore throat.    Eyes:  Negative for visual disturbance.   Respiratory:  Negative for cough, shortness of breath and wheezing.    Cardiovascular:  Positive for leg swelling. Negative for chest pain and palpitations.   Gastrointestinal:  Negative for abdominal pain and nausea.   Genitourinary:  Positive for scrotal swelling.   Musculoskeletal:  Negative for arthralgias and joint swelling.   Skin:  Negative for color change, pallor and rash.   Allergic/Immunologic: Negative for environmental allergies, food allergies and immunocompromised state.   Neurological:  Negative for syncope, light-headedness and headaches.   Hematological:  Negative for adenopathy. Does not bruise/bleed easily.   Psychiatric/Behavioral:  Negative for sleep disturbance. The patient is not nervous/anxious.    Objective:     Vital Signs (Most  Recent):  Temp: 98.4 °F (36.9 °C) (05/11/22 1300)  Pulse: 108 (05/11/22 1300)  Resp: 18 (05/11/22 1300)  BP: 97/63 (05/11/22 1300)  SpO2: 100 % (05/11/22 1300) Vital Signs (24h Range):  Temp:  [97.8 °F (36.6 °C)-98.8 °F (37.1 °C)] 98.4 °F (36.9 °C)  Pulse:  [] 108  Resp:  [17-20] 18  SpO2:  [96 %-100 %] 100 %  BP: ()/(56-78) 97/63     Weight: 124.1 kg (273 lb 9.5 oz)  Body mass index is 34.2 kg/m².      Intake/Output Summary (Last 24 hours) at 5/11/2022 1616  Last data filed at 5/11/2022 0400  Gross per 24 hour   Intake 0 ml   Output 1250 ml   Net -1250 ml       Physical Exam  Vitals reviewed.   Constitutional:       General: He is not in acute distress.     Appearance: He is not toxic-appearing.   HENT:      Head: Normocephalic and atraumatic.      Right Ear: External ear normal.      Left Ear: External ear normal.      Mouth/Throat:      Mouth: Mucous membranes are moist.      Pharynx: Oropharynx is clear.   Eyes:      Conjunctiva/sclera: Conjunctivae normal.      Pupils: Pupils are equal, round, and reactive to light.   Cardiovascular:      Rate and Rhythm: Tachycardia present. Rhythm irregular.      Heart sounds: Normal heart sounds.   Pulmonary:      Effort: Pulmonary effort is normal. No respiratory distress.      Breath sounds: Normal breath sounds. No wheezing, rhonchi or rales.   Abdominal:      General: Abdomen is flat. Bowel sounds are normal. There is distension.      Palpations: Abdomen is soft.   Genitourinary:     Comments: Scrotal edema  Musculoskeletal:         General: Swelling present.      Cervical back: Normal range of motion and neck supple.      Right lower leg: Edema present.      Left lower leg: Edema present.   Lymphadenopathy:      Cervical: No cervical adenopathy.   Skin:     General: Skin is warm and dry.   Neurological:      General: No focal deficit present.      Mental Status: He is alert and oriented to person, place, and time. Mental status is at baseline.   Psychiatric:          Mood and Affect: Mood normal.       Vents:  Oxygen Concentration (%): 36 (05/11/22 0132)    Lines/Drains/Airways       Drain  Duration                  Urethral Catheter 05/06/22 5 days              Peripheral Intravenous Line  Duration                  Peripheral IV - Single Lumen 22 G Posterior;Right Wrist -- days                    Significant Labs:    CBC/Anemia Profile:  Recent Labs   Lab 05/10/22  0342   WBC 6.89   HGB 9.0*   HCT 29.0*   *   MCV 97.6*   RDW 16.7*        Chemistries:  Recent Labs   Lab 05/10/22  0342 05/11/22  1340    142   K 5.4* 5.4*    101   CO2 31 31   BUN 73* 90*   CREATININE 3.67* 3.76*   CALCIUM 8.7 9.4       All pertinent labs within the past 24 hours have been reviewed.    Significant Imaging:   I have reviewed all pertinent imaging results/findings within the past 24 hours.    Assessment/Plan:     * Acute on chronic biventricular congestive heart failure    Echo    Interpretation Summary  · The left ventricle is moderately enlarged with mild concentric hypertrophy and severely decreased systolic function.  · The estimated ejection fraction is 20%.  · There is left ventricular global hypokinesis.  · Moderate right ventricular enlargement with severely reduced right ventricular systolic function.  · Moderate right atrial enlargement.  · Moderate left atrial enlargement.  · Moderate mitral regurgitation.  · Severe tricuspid regurgitation.  · Moderate to severe pulmonic regurgitation.  · There is a bioprosthetic aortic valve present. There is no aortic insufficiency present. Prosthetic aortic valve is normal.  · The aortic valve mean gradient is 9 mmHg with a dimensionless index of 0.14.  · Elevated central venous pressure (15 mmHg).  · The estimated PA systolic pressure is 40 mmHg.  · There are bilateral pleural effusions.    - biventricular failure  - does not appear to be in cardiogenic shock at this time  - cardiology recommending dopamine  - I would  recommend metolazone or aldactone in addition to his bumex vs initiation of hemodialysis  - needs a serious hospice discussion    Renal failure (ARF), acute on chronic  - not tolerating diuresis due to worsening renal function  - will likely need HD ultimately  - consider the addition of aldactone to his bumex    A-fib  - cardiology recommending amiodarone infusion  - -130 on my exam, asymptomatic  - this is likely in large part to his severe heart failure  - he is on this medication as outpatient, could probably just do oral loading vs digoxin  - unable to accomplish amiodarone infusion on medical floor          Thank you for your consult. I will sign off. Please contact us if you have any additional questions.     Obi Donovan MD  Pulmonology  [facility] \[facility]\""

## 2022-05-11 NOTE — PROGRESS NOTES
Patient's blood pressure at 2000 was 81/56. Secure chat sent to Dr. Ricardo to make him aware. Rec'd orders to hold patient's 2100 dose of Bumex and Lopressor. Also rec'd order to bolus patient with LR 250ml. No further orders rec'd.

## 2022-05-11 NOTE — ASSESSMENT & PLAN NOTE
Echo    Interpretation Summary  · The left ventricle is moderately enlarged with mild concentric hypertrophy and severely decreased systolic function.  · The estimated ejection fraction is 20%.  · There is left ventricular global hypokinesis.  · Moderate right ventricular enlargement with severely reduced right ventricular systolic function.  · Moderate right atrial enlargement.  · Moderate left atrial enlargement.  · Moderate mitral regurgitation.  · Severe tricuspid regurgitation.  · Moderate to severe pulmonic regurgitation.  · There is a bioprosthetic aortic valve present. There is no aortic insufficiency present. Prosthetic aortic valve is normal.  · The aortic valve mean gradient is 9 mmHg with a dimensionless index of 0.14.  · Elevated central venous pressure (15 mmHg).  · The estimated PA systolic pressure is 40 mmHg.  · There are bilateral pleural effusions.    - biventricular failure  - does not appear to be in cardiogenic shock at this time  - cardiology recommending dopamine  - I would recommend metolazone or aldactone in addition to his bumex vs initiation of hemodialysis  - needs a serious hospice discussion

## 2022-05-11 NOTE — ASSESSMENT & PLAN NOTE
- Continuous oxygen  - Continue bumetinide 1mg IV bid  - dexter  - Cardiology consult  - ECHO pending  - telemetry  - continue metoprolol 50mg bid   - daily weights  - daily I+O  - low sodium diet  - dietary consult      05/11 sob this morning  02 per nc   Cardiology consulted

## 2022-05-11 NOTE — HPI
The patient is a 62yo AA male with a MedHX of CHF, bioprosthetic aortic valve, paroxysmal afib, chronic anticoagulation of eliquis, HTN, HLD, AAA repair, HLD, COPD on home oxygen, CKD stage III, cirrhosis and GERD who presents to LTAC for long-term care of his CHF exacerbation and acute on chronic renal failure. The patient had originally been admitted to [facility] for CHF exacerbation on 04/27, where he was also found to have afib with RVR and heme positive stools. His Eliquis was held and GI saw him and did not recommend any inpatient GI workup. He was started back on his Eliquis and IV diuresis with bumex. This was stopped after his creatinine was found to be elevated above baseline. He was started back on diuretics after his volume status was found to still be decompensated. The patient's home metoprolol dose was also increased while at [facility], and his home losartan was held.     The patient was found to be stable at [facility] and was accepted to LTAC for further monitoring/care by Dr. Solis for his acute on chronic CHF exacerbation.      5/11/22:  Cardiology has been consulted for acute CHF with hx of Biventricular failure. Echo yesterday showed EF 20%, moderate RV enlargement with severely reduced RV systolic. NTpBNP is 27K. Pt is currently on Dopamine gtt. Lactate is normal. Initial troponin is normal, will trend. Pt is anasarcic. Tele monitor shows afib RVR with -130s. Amiodarone gtt has been ordered. Pt has had 600ml of urine output since this AM. Creatinine is 3.7. Potassium is 5.4.

## 2022-05-11 NOTE — ASSESSMENT & PLAN NOTE
- afib with RVR - -130s  - continue amiodarone gtt  - give one time dose of IV digoxin 500mcg now  - continue Eliquis

## 2022-05-11 NOTE — PT/OT/SLP PROGRESS
Physical Therapy      Patient Name:  Modesto Patrick   MRN:  [id number]    Patient not seen today secondary to Nurse/ SULMA hold (declining medical status). Will follow-up tomorrow.

## 2022-05-11 NOTE — CONSULTS
[facility]  Cardiology  Consult Note    Patient Name: Modesto Patrick  MRN: [id number]  Admission Date: 5/9/2022  Hospital Length of Stay: 2 days  Code Status: DNR   Attending Provider: Neeta Solis MD   Consulting Provider: RENEE Verde  Primary Care Physician: Primary Doctor No  Principal Problem:Biventricular congestive heart failure    Patient information was obtained from patient and ER records.     Inpatient consult to Cardiology  Consult performed by: RENEE Verde  Consult ordered by: Saskia Cortez MD        Subjective:       HPI:   The patient is a 62yo AA male with a MedHX of CHF, bioprosthetic aortic valve, paroxysmal afib, chronic anticoagulation of eliquis, HTN, HLD, AAA repair, HLD, COPD on home oxygen, CKD stage III, cirrhosis and GERD who presents to LTAC for long-term care of his CHF exacerbation and acute on chronic renal failure. The patient had originally been admitted to [facility] for CHF exacerbation on 04/27, where he was also found to have afib with RVR and heme positive stools. His Eliquis was held and GI saw him and did not recommend any inpatient GI workup. He was started back on his Eliquis and IV diuresis with bumex. This was stopped after his creatinine was found to be elevated above baseline. He was started back on diuretics after his volume status was found to still be decompensated. The patient's home metoprolol dose was also increased while at [facility], and his home losartan was held.     The patient was found to be stable at [facility] and was accepted to LTAC for further monitoring/care by Dr. Solis for his acute on chronic CHF exacerbation.      5/11/22:  Cardiology has been consulted for acute CHF with hx of Biventricular failure. Echo yesterday showed EF 20%, moderate RV enlargement with severely reduced RV systolic. NTpBNP is 27K. Pt is currently on Dopamine gtt. Lactate is normal. Initial troponin is normal,  will trend. Pt is anasarcic. Tele monitor shows afib RVR with -130s. Amiodarone gtt has been ordered. Pt has had 600ml of urine output since this AM. Creatinine is 3.7. Potassium is 5.4.       Past Medical History:   Diagnosis Date    AAA (abdominal aortic aneurysm)     s/p repair    Anxiety disorder, unspecified     CHF (congestive heart failure)     Biventricular HF    CKD (chronic kidney disease)     COPD (chronic obstructive pulmonary disease)     Current use of long term anticoagulation     DVT (deep venous thrombosis)     GERD (gastroesophageal reflux disease)     HLD (hyperlipidemia)     HTN (hypertension)     Paroxysmal atrial fibrillation     Unspecified cirrhosis of liver     Unspecified cirrhosis of liver        Past Surgical History:   Procedure Laterality Date    atortic aneu      REPAIR OF ABDOMINAL AORTIC ANEURYSM         Review of patient's allergies indicates:   Allergen Reactions    Lasix [furosemide]        No current facility-administered medications on file prior to encounter.     Current Outpatient Medications on File Prior to Encounter   Medication Sig    amiodarone (PACERONE) 200 MG Tab Take 200 mg by mouth 2 (two) times a day.    bumetanide (BUMEX) 0.5 MG Tab Take 2 mg by mouth once daily.    buPROPion (WELLBUTRIN) 75 MG tablet Take 75 mg by mouth 2 (two) times daily.    carvediloL (COREG) 6.25 MG tablet Take 6.25 mg by mouth 2 (two) times daily.    metOLazone (ZAROXOLYN) 2.5 MG tablet Take 2.5 mg by mouth once daily.    QUEtiapine (SEROQUEL) 100 MG Tab Take 100 mg by mouth 2 (two) times a day.    valsartan (DIOVAN) 40 MG tablet Take 40 mg by mouth 2 (two) times daily.    lactulose (CEPHULAC) 20 gram Pack Take 20 g by mouth 4 (four) times daily.    rifAXImin (XIFAXAN) 200 mg Tab Take 550 mg by mouth 2 (two) times daily.     Family History       Problem Relation (Age of Onset)    No Known Problems Mother, Father          Tobacco Use    Smoking status: Current  Every Day Smoker     Packs/day: 1.00     Types: Cigarettes    Smokeless tobacco: Never Used   Substance and Sexual Activity    Alcohol use: Not Currently    Drug use: Never    Sexual activity: Not Currently     Review of Systems   Constitutional: Negative for diaphoresis.   Cardiovascular:  Positive for chest pain, dyspnea on exertion, leg swelling and orthopnea. Negative for syncope.   Respiratory:  Positive for shortness of breath.    Gastrointestinal:  Negative for nausea and vomiting.   Objective:     Vital Signs (Most Recent):  Temp: 98.4 °F (36.9 °C) (05/11/22 1300)  Pulse: (!) 127 (05/11/22 1615)  Resp: (!) 22 (05/11/22 1615)  BP: 97/63 (05/11/22 1300)  SpO2: 100 % (05/11/22 1615)   Vital Signs (24h Range):  Temp:  [97.8 °F (36.6 °C)-98.8 °F (37.1 °C)] 98.4 °F (36.9 °C)  Pulse:  [] 127  Resp:  [17-22] 22  SpO2:  [96 %-100 %] 100 %  BP: ()/(56-78) 97/63     Weight: 124.1 kg (273 lb 9.5 oz)  Body mass index is 34.2 kg/m².    SpO2: 100 %  O2 Device (Oxygen Therapy): nasal cannula      Intake/Output Summary (Last 24 hours) at 5/11/2022 1634  Last data filed at 5/11/2022 0400  Gross per 24 hour   Intake 0 ml   Output 1250 ml   Net -1250 ml       Lines/Drains/Airways       Drain  Duration                  Urethral Catheter 05/06/22 5 days              Peripheral Intravenous Line  Duration                  Peripheral IV - Single Lumen 22 G Posterior;Right Wrist -- days                    Physical Exam  Constitutional:       General: He is not in acute distress.     Appearance: Normal appearance. He is ill-appearing.   HENT:      Head: Atraumatic.   Eyes:      Pupils: Pupils are equal, round, and reactive to light.   Cardiovascular:      Rate and Rhythm: Tachycardia present. Rhythm irregularly irregular.      Pulses: Normal pulses.      Heart sounds: Normal heart sounds.   Pulmonary:      Effort: Pulmonary effort is normal.      Breath sounds: Rales present.   Musculoskeletal:      Cervical back: Neck  supple. No rigidity.      Comments: Anasarca   Skin:     General: Skin is warm and dry.   Neurological:      Mental Status: He is alert and oriented to person, place, and time.   Psychiatric:         Mood and Affect: Mood normal.         Behavior: Behavior normal.       Significant Labs: All pertinent lab results from the last 24 hours have been reviewed. and   Recent Lab Results         05/11/22  1340   05/11/22  1045        Anion Gap 15         BUN 90         BUN/CREAT RATIO 24         Calcium 9.4         Chloride 101         CO2 31         Creatinine 3.76         eGFR if non African American 17         Glucose 111         Lactate, Aiden   0.8       NT-proBNP   27,480       Potassium 5.4         Sodium 142         Troponin I High Sensitivity   28.7               Significant Imaging: Echocardiogram: Transthoracic echo (TTE) complete (Cupid Only):   Results for orders placed or performed during the hospital encounter of 05/09/22   Echo   Result Value Ref Range    BSA 2.57 m2    Right Atrial Pressure (from IVC) 15 mmHg    EF 20 %    Left Ventricular Outflow Tract Mean Gradient 0.30 mmHg    AORTIC VALVE CUSP SEPERATION [id number] cm    LVIDd 5.90 3.5 - 6.0 cm    IVS 1.22 (A) 0.6 - 1.1 cm    Posterior Wall 1.27 (A) 0.6 - 1.1 cm    Ao root annulus 3.40 cm    LVIDs 5.40 (A) 2.1 - 4.0 cm    FS 8 28 - 44 %    IVC ostium 3.0 cm    LV mass 321.10 g    LA size 4.80 cm    RVDD 5.10 cm    TAPSE 1.90 cm    Left Ventricle Relative Wall Thickness 0.43 cm    AV mean gradient 9 mmHg    AV valve area 0.47 cm2    AV Velocity Ratio 0.20     AV index (prosthetic) 0.14     MV mean gradient 17 mmHg    MV valve area p 1/2 method 4.40 cm2    MV valve area by continuity eq 0.30 cm2    E wave deceleration time 179 msec    LVOT diameter 2.10 cm    LVOT area 3.5 cm2    LVOT peak ese 0.4 m/s    LVOT peak VTI 5.00 cm    Ao peak ese 2.0 m/s    Ao VTI 37.00 cm    LVOT stroke volume 17.31 cm3    AV peak gradient 16 mmHg    MV peak gradient 28  mmHg    TV rest pulmonary artery pressure 40 mmHg    MV Peak E Walker 1.80 m/s    TR Max Walker 2.50 m/s    MV VTI 57.0 cm    MV stenosis pressure 1/2 time 50 ms    LV Systolic Volume 141.30 mL    LV Systolic Volume Index 56.3 mL/m2    LV Diastolic Volume 173.20 mL    LV Diastolic Volume Index 69.00 mL/m2    LV Mass Index 128 g/m2    Echo EF Estimated 25 %    RA Major Axis 5.00 cm    Triscuspid Valve Regurgitation Peak Gradient 25 mmHg    Narrative    · The left ventricle is moderately enlarged with mild concentric   hypertrophy and severely decreased systolic function.  · The estimated ejection fraction is 20%.  · There is left ventricular global hypokinesis.  · Moderate right ventricular enlargement with severely reduced right   ventricular systolic function.  · Moderate right atrial enlargement.  · Moderate left atrial enlargement.  · Moderate mitral regurgitation.  · Severe tricuspid regurgitation.  · Moderate to severe pulmonic regurgitation.  · There is a bioprosthetic aortic valve present. There is no aortic   insufficiency present. Prosthetic aortic valve is normal.  · The aortic valve mean gradient is 9 mmHg with a dimensionless index of   0.14.  · Elevated central venous pressure (15 mmHg).  · The estimated PA systolic pressure is 40 mmHg.  · There are bilateral pleural effusions.        Assessment and Plan:     * Acute on chronic biventricular congestive heart failure  - pt is severely decompensated  - he is allergic to lasix  - has been receiving IVP bumex 1mg bid  - unable to get bumex infusion due to national Bumex shortage  - increase IVP bumex to 2mg bid  - continue dopamine gtt  - daily weights, strict I&Os, low sodium diet      Renal failure (ARF), acute on chronic  - creatinine is 3.7, was 2.8  - nephrology following    A-fib  - afib with RVR - -130s  - continue amiodarone gtt  - give one time dose of IV digoxin 500mcg now  - continue Eliquis        VTE Risk Mitigation (From admission, onward)          Ordered     apixaban tablet 2.5 mg  2 times daily         05/09/22 1746     IP VTE HIGH RISK PATIENT  Once         05/09/22 1736     Place sequential compression device  Until discontinued         05/09/22 1736                Thank you for your consult. I will follow-up with patient. Please contact us if you have any additional questions.    Holly Smith, RENEE  Cardiology   [facility]

## 2022-05-11 NOTE — ASSESSMENT & PLAN NOTE
- continue eliquis 2.5mg bid  - continue amiodarone 200mg bid  - telemetry      05/11  - on amiodarone and lopressor   -continue eliquis

## 2022-05-11 NOTE — ASSESSMENT & PLAN NOTE
- pt is severely decompensated  - he is allergic to lasix  - has been receiving IVP bumex 1mg bid  - unable to get bumex infusion due to national Bumex shortage  - increase IVP bumex to 2mg bid  - continue dopamine gtt  - daily weights, strict I&Os, low sodium diet

## 2022-05-11 NOTE — ASSESSMENT & PLAN NOTE
- not tolerating diuresis due to worsening renal function  - will likely need HD ultimately  - consider the addition of aldactone to his bumex

## 2022-05-11 NOTE — SUBJECTIVE & OBJECTIVE
Past Medical History:   Diagnosis Date    AAA (abdominal aortic aneurysm)     s/p repair    Anxiety disorder, unspecified     CHF (congestive heart failure)     Biventricular HF    CKD (chronic kidney disease)     COPD (chronic obstructive pulmonary disease)     Current use of long term anticoagulation     DVT (deep venous thrombosis)     GERD (gastroesophageal reflux disease)     HLD (hyperlipidemia)     HTN (hypertension)     Paroxysmal atrial fibrillation     Unspecified cirrhosis of liver     Unspecified cirrhosis of liver        Past Surgical History:   Procedure Laterality Date    atortic aneu      REPAIR OF ABDOMINAL AORTIC ANEURYSM         Review of patient's allergies indicates:   Allergen Reactions    Lasix [furosemide]        Family History       Problem Relation (Age of Onset)    No Known Problems Mother, Father          Tobacco Use    Smoking status: Current Every Day Smoker     Packs/day: 1.00     Types: Cigarettes    Smokeless tobacco: Never Used   Substance and Sexual Activity    Alcohol use: Not Currently    Drug use: Never    Sexual activity: Not Currently         Review of Systems   Constitutional:  Negative for appetite change, fatigue, fever and unexpected weight change.   HENT:  Negative for congestion, postnasal drip, rhinorrhea, sinus pressure and sore throat.    Eyes:  Negative for visual disturbance.   Respiratory:  Negative for cough, shortness of breath and wheezing.    Cardiovascular:  Positive for leg swelling. Negative for chest pain and palpitations.   Gastrointestinal:  Negative for abdominal pain and nausea.   Genitourinary:  Positive for scrotal swelling.   Musculoskeletal:  Negative for arthralgias and joint swelling.   Skin:  Negative for color change, pallor and rash.   Allergic/Immunologic: Negative for environmental allergies, food allergies and immunocompromised state.   Neurological:  Negative for syncope, light-headedness and headaches.   Hematological:  Negative for  adenopathy. Does not bruise/bleed easily.   Psychiatric/Behavioral:  Negative for sleep disturbance. The patient is not nervous/anxious.    Objective:     Vital Signs (Most Recent):  Temp: 98.4 °F (36.9 °C) (05/11/22 1300)  Pulse: 108 (05/11/22 1300)  Resp: 18 (05/11/22 1300)  BP: 97/63 (05/11/22 1300)  SpO2: 100 % (05/11/22 1300) Vital Signs (24h Range):  Temp:  [97.8 °F (36.6 °C)-98.8 °F (37.1 °C)] 98.4 °F (36.9 °C)  Pulse:  [] 108  Resp:  [17-20] 18  SpO2:  [96 %-100 %] 100 %  BP: ()/(56-78) 97/63     Weight: 124.1 kg (273 lb 9.5 oz)  Body mass index is 34.2 kg/m².      Intake/Output Summary (Last 24 hours) at 5/11/2022 1616  Last data filed at 5/11/2022 0400  Gross per 24 hour   Intake 0 ml   Output 1250 ml   Net -1250 ml       Physical Exam  Vitals reviewed.   Constitutional:       General: He is not in acute distress.     Appearance: He is not toxic-appearing.   HENT:      Head: Normocephalic and atraumatic.      Right Ear: External ear normal.      Left Ear: External ear normal.      Mouth/Throat:      Mouth: Mucous membranes are moist.      Pharynx: Oropharynx is clear.   Eyes:      Conjunctiva/sclera: Conjunctivae normal.      Pupils: Pupils are equal, round, and reactive to light.   Cardiovascular:      Rate and Rhythm: Tachycardia present. Rhythm irregular.      Heart sounds: Normal heart sounds.   Pulmonary:      Effort: Pulmonary effort is normal. No respiratory distress.      Breath sounds: Normal breath sounds. No wheezing, rhonchi or rales.   Abdominal:      General: Abdomen is flat. Bowel sounds are normal. There is distension.      Palpations: Abdomen is soft.   Genitourinary:     Comments: Scrotal edema  Musculoskeletal:         General: Swelling present.      Cervical back: Normal range of motion and neck supple.      Right lower leg: Edema present.      Left lower leg: Edema present.   Lymphadenopathy:      Cervical: No cervical adenopathy.   Skin:     General: Skin is warm and dry.    Neurological:      General: No focal deficit present.      Mental Status: He is alert and oriented to person, place, and time. Mental status is at baseline.   Psychiatric:         Mood and Affect: Mood normal.       Vents:  Oxygen Concentration (%): 36 (05/11/22 0132)    Lines/Drains/Airways       Drain  Duration                  Urethral Catheter 05/06/22 5 days              Peripheral Intravenous Line  Duration                  Peripheral IV - Single Lumen 22 G Posterior;Right Wrist -- days                    Significant Labs:    CBC/Anemia Profile:  Recent Labs   Lab 05/10/22  0342   WBC 6.89   HGB 9.0*   HCT 29.0*   *   MCV 97.6*   RDW 16.7*        Chemistries:  Recent Labs   Lab 05/10/22  0342 05/11/22  1340    142   K 5.4* 5.4*    101   CO2 31 31   BUN 73* 90*   CREATININE 3.67* 3.76*   CALCIUM 8.7 9.4       All pertinent labs within the past 24 hours have been reviewed.    Significant Imaging:   I have reviewed all pertinent imaging results/findings within the past 24 hours.

## 2022-05-11 NOTE — SUBJECTIVE & OBJECTIVE
Interval History: 05/11 Awake and resting in bed. Having sob. Edematous.  Sat is 96%. Vitals signs stable. Recheck potassium level today.    Review of Systems   Respiratory:  Positive for shortness of breath. Negative for cough.    Cardiovascular:  Negative for chest pain.   Gastrointestinal:  Negative for constipation, diarrhea and vomiting.   Objective:     Vital Signs (Most Recent):  Temp: 98.4 °F (36.9 °C) (05/11/22 1300)  Pulse: 108 (05/11/22 1300)  Resp: 18 (05/11/22 1300)  BP: 97/63 (05/11/22 1300)  SpO2: 100 % (05/11/22 1300) Vital Signs (24h Range):  Temp:  [97.8 °F (36.6 °C)-98.8 °F (37.1 °C)] 98.4 °F (36.9 °C)  Pulse:  [] 108  Resp:  [17-20] 18  SpO2:  [96 %-100 %] 100 %  BP: ()/(56-78) 97/63     Weight: 124.1 kg (273 lb 9.5 oz)  Body mass index is 34.2 kg/m².    Intake/Output Summary (Last 24 hours) at 5/11/2022 1354  Last data filed at 5/11/2022 0400  Gross per 24 hour   Intake 0 ml   Output 1250 ml   Net -1250 ml      Physical Exam  HENT:      Head: Normocephalic.      Mouth/Throat:      Mouth: Mucous membranes are moist.   Cardiovascular:      Rate and Rhythm: Tachycardia present. Rhythm irregular.   Abdominal:      General: Bowel sounds are normal.   Skin:     General: Skin is warm and dry.      Comments: edematous   Psychiatric:         Mood and Affect: Mood normal.       Significant Labs: All pertinent labs within the past 24 hours have been reviewed.  Recent Lab Results         05/11/22  1045   05/10/22  1526        Lactate, Aiden 0.8         NT-proBNP 27,480         POC Glucose   132       Troponin I High Sensitivity 28.7                 Significant Imaging: I have reviewed all pertinent imaging results/findings within the past 24 hours.

## 2022-05-11 NOTE — HPI
61-year-old black male with history of heart failure with reduced ejection fraction, chronic atrial fibrillation, CKD 3, and cirrhosis.  He was initially admitted to [facility] for heart failure exacerbation on 04/27 and discharged to [facility] on 05/09 for ongoing treatment.  Per notes and his hospital course here the patient has not been tolerating IV diuresis secondary to worsening renal function, despite using albumin in addition to his diuretics.  Nephrology has been consulted along with Cardiology.  Per verbal report cardiology has recommended an amiodarone infusion along with dopamine, this is unable to be accomplished, general medical floor in [facility] and therefore requesting ICU consultation.  Patient denies any current complaints denies any chest pain or palpitations.  He denies any shortness of breath.  He is grossly volume overloaded with diffuse anasarca and edema extending from the lower extremities to the anterior abdominal wall.  Left hand and arm swelling.  By the time of my evaluation the patient has already been transferred to the high observation unit under the care of Dr. Solis.

## 2022-05-11 NOTE — HOSPITAL COURSE
05/11 Awake and resting in bed. Having sob. Edematous.  Sat is 96%. Vitals signs stable. Recheck potassium level today.  05/12 Mr. Patrick had afib with RVR and HF on 05/11. Required IV amiodarone and dopamine. Heart rate this am is in 70s - will dc amio drip and transition to amiodarone po. States he is feeling about the same.    5/18 patient with multiple comorbidities including end-stage biventricular heart failure, cirrhosis, was in hospital due to acute decompensation.  Required intravenous inotropes, IV amiodarone.  He stabilized and this week appears to be at his new baseline.  On the day of discharge vital signs were 105/58, 66, 16, 98.6, 91% on 2 L of oxygen.  On exam he was comfortable having just completed his entire breakfast.  No oral exudates, moist mucosa, no added lung sounds, lowered systolic heart murmur, abdomen soft and nontender, chronic bilateral leg edema.  Lab investigations reviewed, slight increase in creatinine related to diuresis.  Patient with overall poor prognosis and he accepted hospice care at home.  His care was discussed with his sister 2 days ago and with his brother yesterday.

## 2022-05-11 NOTE — SUBJECTIVE & OBJECTIVE
Past Medical History:   Diagnosis Date    AAA (abdominal aortic aneurysm)     s/p repair    Anxiety disorder, unspecified     CHF (congestive heart failure)     Biventricular HF    CKD (chronic kidney disease)     COPD (chronic obstructive pulmonary disease)     Current use of long term anticoagulation     DVT (deep venous thrombosis)     GERD (gastroesophageal reflux disease)     HLD (hyperlipidemia)     HTN (hypertension)     Paroxysmal atrial fibrillation     Unspecified cirrhosis of liver     Unspecified cirrhosis of liver        Past Surgical History:   Procedure Laterality Date    atortic aneu      REPAIR OF ABDOMINAL AORTIC ANEURYSM         Review of patient's allergies indicates:   Allergen Reactions    Lasix [furosemide]        No current facility-administered medications on file prior to encounter.     Current Outpatient Medications on File Prior to Encounter   Medication Sig    amiodarone (PACERONE) 200 MG Tab Take 200 mg by mouth 2 (two) times a day.    bumetanide (BUMEX) 0.5 MG Tab Take 2 mg by mouth once daily.    buPROPion (WELLBUTRIN) 75 MG tablet Take 75 mg by mouth 2 (two) times daily.    carvediloL (COREG) 6.25 MG tablet Take 6.25 mg by mouth 2 (two) times daily.    metOLazone (ZAROXOLYN) 2.5 MG tablet Take 2.5 mg by mouth once daily.    QUEtiapine (SEROQUEL) 100 MG Tab Take 100 mg by mouth 2 (two) times a day.    valsartan (DIOVAN) 40 MG tablet Take 40 mg by mouth 2 (two) times daily.    lactulose (CEPHULAC) 20 gram Pack Take 20 g by mouth 4 (four) times daily.    rifAXImin (XIFAXAN) 200 mg Tab Take 550 mg by mouth 2 (two) times daily.     Family History       Problem Relation (Age of Onset)    No Known Problems Mother, Father          Tobacco Use    Smoking status: Current Every Day Smoker     Packs/day: 1.00     Types: Cigarettes    Smokeless tobacco: Never Used   Substance and Sexual Activity    Alcohol use: Not Currently    Drug use: Never    Sexual activity: Not Currently     Review of  Systems   Constitutional: Negative for diaphoresis.   Cardiovascular:  Positive for chest pain, dyspnea on exertion, leg swelling and orthopnea. Negative for syncope.   Respiratory:  Positive for shortness of breath.    Gastrointestinal:  Negative for nausea and vomiting.   Objective:     Vital Signs (Most Recent):  Temp: 98.4 °F (36.9 °C) (05/11/22 1300)  Pulse: (!) 127 (05/11/22 1615)  Resp: (!) 22 (05/11/22 1615)  BP: 97/63 (05/11/22 1300)  SpO2: 100 % (05/11/22 1615)   Vital Signs (24h Range):  Temp:  [97.8 °F (36.6 °C)-98.8 °F (37.1 °C)] 98.4 °F (36.9 °C)  Pulse:  [] 127  Resp:  [17-22] 22  SpO2:  [96 %-100 %] 100 %  BP: ()/(56-78) 97/63     Weight: 124.1 kg (273 lb 9.5 oz)  Body mass index is 34.2 kg/m².    SpO2: 100 %  O2 Device (Oxygen Therapy): nasal cannula      Intake/Output Summary (Last 24 hours) at 5/11/2022 1634  Last data filed at 5/11/2022 0400  Gross per 24 hour   Intake 0 ml   Output 1250 ml   Net -1250 ml       Lines/Drains/Airways       Drain  Duration                  Urethral Catheter 05/06/22 5 days              Peripheral Intravenous Line  Duration                  Peripheral IV - Single Lumen 22 G Posterior;Right Wrist -- days                    Physical Exam  Constitutional:       General: He is not in acute distress.     Appearance: Normal appearance. He is ill-appearing.   HENT:      Head: Atraumatic.   Eyes:      Pupils: Pupils are equal, round, and reactive to light.   Cardiovascular:      Rate and Rhythm: Tachycardia present. Rhythm irregularly irregular.      Pulses: Normal pulses.      Heart sounds: Normal heart sounds.   Pulmonary:      Effort: Pulmonary effort is normal.      Breath sounds: Rales present.   Musculoskeletal:      Cervical back: Neck supple. No rigidity.      Comments: Anasarca   Skin:     General: Skin is warm and dry.   Neurological:      Mental Status: He is alert and oriented to person, place, and time.   Psychiatric:         Mood and Affect: Mood  normal.         Behavior: Behavior normal.       Significant Labs: All pertinent lab results from the last 24 hours have been reviewed. and   Recent Lab Results         05/11/22  1340   05/11/22  1045        Anion Gap 15         BUN 90         BUN/CREAT RATIO 24         Calcium 9.4         Chloride 101         CO2 31         Creatinine 3.76         eGFR if non African American 17         Glucose 111         Lactate, Aiden   0.8       NT-proBNP   27,480       Potassium 5.4         Sodium 142         Troponin I High Sensitivity   28.7               Significant Imaging: Echocardiogram: Transthoracic echo (TTE) complete (Cupid Only):   Results for orders placed or performed during the hospital encounter of 05/09/22   Echo   Result Value Ref Range    BSA 2.57 m2    Right Atrial Pressure (from IVC) 15 mmHg    EF 20 %    Left Ventricular Outflow Tract Mean Gradient 0.30 mmHg    AORTIC VALVE CUSP SEPERATION 15.739960755891605 cm    LVIDd 5.90 3.5 - 6.0 cm    IVS 1.22 (A) 0.6 - 1.1 cm    Posterior Wall 1.27 (A) 0.6 - 1.1 cm    Ao root annulus 3.40 cm    LVIDs 5.40 (A) 2.1 - 4.0 cm    FS 8 28 - 44 %    IVC ostium 3.0 cm    LV mass 321.10 g    LA size 4.80 cm    RVDD 5.10 cm    TAPSE 1.90 cm    Left Ventricle Relative Wall Thickness 0.43 cm    AV mean gradient 9 mmHg    AV valve area 0.47 cm2    AV Velocity Ratio 0.20     AV index (prosthetic) 0.14     MV mean gradient 17 mmHg    MV valve area p 1/2 method 4.40 cm2    MV valve area by continuity eq 0.30 cm2    E wave deceleration time 179 msec    LVOT diameter 2.10 cm    LVOT area 3.5 cm2    LVOT peak walker 0.4 m/s    LVOT peak VTI 5.00 cm    Ao peak walker 2.0 m/s    Ao VTI 37.00 cm    LVOT stroke volume 17.31 cm3    AV peak gradient 16 mmHg    MV peak gradient 28 mmHg    TV rest pulmonary artery pressure 40 mmHg    MV Peak E Walker 1.80 m/s    TR Max Walker 2.50 m/s    MV VTI 57.0 cm    MV stenosis pressure 1/2 time 50 ms    LV Systolic Volume 141.30 mL    LV Systolic Volume Index 56.3  mL/m2    LV Diastolic Volume 173.20 mL    LV Diastolic Volume Index 69.00 mL/m2    LV Mass Index 128 g/m2    Echo EF Estimated 25 %    RA Major Axis 5.00 cm    Triscuspid Valve Regurgitation Peak Gradient 25 mmHg    Narrative    · The left ventricle is moderately enlarged with mild concentric   hypertrophy and severely decreased systolic function.  · The estimated ejection fraction is 20%.  · There is left ventricular global hypokinesis.  · Moderate right ventricular enlargement with severely reduced right   ventricular systolic function.  · Moderate right atrial enlargement.  · Moderate left atrial enlargement.  · Moderate mitral regurgitation.  · Severe tricuspid regurgitation.  · Moderate to severe pulmonic regurgitation.  · There is a bioprosthetic aortic valve present. There is no aortic   insufficiency present. Prosthetic aortic valve is normal.  · The aortic valve mean gradient is 9 mmHg with a dimensionless index of   0.14.  · Elevated central venous pressure (15 mmHg).  · The estimated PA systolic pressure is 40 mmHg.  · There are bilateral pleural effusions.

## 2022-05-11 NOTE — ASSESSMENT & PLAN NOTE
- cardiology recommending amiodarone infusion  - -130 on my exam, asymptomatic  - this is likely in large part to his severe heart failure  - he is on this medication as outpatient, could probably just do oral loading vs digoxin  - unable to accomplish amiodarone infusion on medical floor

## 2022-05-12 ENCOUNTER — HOSPITAL ENCOUNTER (OUTPATIENT)
Dept: RADIOLOGY | Facility: HOSPITAL | Age: 62
Discharge: HOME | End: 2022-05-12
Attending: INTERNAL MEDICINE
Payer: MEDICARE

## 2022-05-12 LAB
ALBUMIN SERPL BCP-MCNC: 3.2 G/DL (ref 3.5–5)
ALBUMIN/GLOB SERPL: 1.1 {RATIO}
ALP SERPL-CCNC: 79 U/L (ref 45–115)
ALT SERPL W P-5'-P-CCNC: 13 U/L (ref 16–61)
ANION GAP SERPL CALCULATED.3IONS-SCNC: 10 MMOL/L (ref 7–16)
AST SERPL W P-5'-P-CCNC: 12 U/L (ref 15–37)
BILIRUB SERPL-MCNC: 0.7 MG/DL (ref 0–1.2)
BUN SERPL-MCNC: 85 MG/DL (ref 7–18)
BUN/CREAT SERPL: 23 (ref 6–20)
CALCIUM SERPL-MCNC: 8.6 MG/DL (ref 8.5–10.1)
CHLORIDE SERPL-SCNC: 103 MMOL/L (ref 98–107)
CO2 SERPL-SCNC: 32 MMOL/L (ref 21–32)
CREAT SERPL-MCNC: 3.65 MG/DL (ref 0.7–1.3)
GLOBULIN SER-MCNC: 2.9 G/DL (ref 2–4)
GLUCOSE SERPL-MCNC: 101 MG/DL (ref 74–106)
GLUCOSE SERPL-MCNC: 128 MG/DL (ref 70–105)
POTASSIUM SERPL-SCNC: 4.9 MMOL/L (ref 3.5–5.1)
PROT SERPL-MCNC: 6.1 G/DL (ref 6.4–8.2)
SODIUM SERPL-SCNC: 140 MMOL/L (ref 136–145)

## 2022-05-12 PROCEDURE — 25000003 PHARM REV CODE 250: Performed by: NURSE PRACTITIONER

## 2022-05-12 PROCEDURE — 99233 SBSQ HOSP IP/OBS HIGH 50: CPT | Mod: ,,, | Performed by: INTERNAL MEDICINE

## 2022-05-12 PROCEDURE — 36415 COLL VENOUS BLD VENIPUNCTURE: CPT | Performed by: NURSE PRACTITIONER

## 2022-05-12 PROCEDURE — 99232 PR SUBSEQUENT HOSPITAL CARE,LEVL II: ICD-10-PCS | Mod: ,,, | Performed by: STUDENT IN AN ORGANIZED HEALTH CARE EDUCATION/TRAINING PROGRAM

## 2022-05-12 PROCEDURE — 63600150 PHARM REV CODE 636: Performed by: STUDENT IN AN ORGANIZED HEALTH CARE EDUCATION/TRAINING PROGRAM

## 2022-05-12 PROCEDURE — 94761 N-INVAS EAR/PLS OXIMETRY MLT: CPT

## 2022-05-12 PROCEDURE — 99232 SBSQ HOSP IP/OBS MODERATE 35: CPT | Mod: ,,, | Performed by: STUDENT IN AN ORGANIZED HEALTH CARE EDUCATION/TRAINING PROGRAM

## 2022-05-12 PROCEDURE — 11000008

## 2022-05-12 PROCEDURE — 63600175 PHARM REV CODE 636 W HCPCS: Performed by: INTERNAL MEDICINE

## 2022-05-12 PROCEDURE — 82962 GLUCOSE BLOOD TEST: CPT

## 2022-05-12 PROCEDURE — 25000003 PHARM REV CODE 250: Performed by: INTERNAL MEDICINE

## 2022-05-12 PROCEDURE — 25000242 PHARM REV CODE 250 ALT 637 W/ HCPCS

## 2022-05-12 PROCEDURE — C1751 CATH, INF, PER/CENT/MIDLINE: HCPCS

## 2022-05-12 PROCEDURE — 99233 PR SUBSEQUENT HOSPITAL CARE,LEVL III: ICD-10-PCS | Mod: ,,, | Performed by: INTERNAL MEDICINE

## 2022-05-12 PROCEDURE — 36415 COLL VENOUS BLD VENIPUNCTURE: CPT | Performed by: INTERNAL MEDICINE

## 2022-05-12 PROCEDURE — 27000221 HC OXYGEN, UP TO 24 HOURS

## 2022-05-12 PROCEDURE — 94640 AIRWAY INHALATION TREATMENT: CPT

## 2022-05-12 PROCEDURE — 25000003 PHARM REV CODE 250

## 2022-05-12 PROCEDURE — 25000003 PHARM REV CODE 250: Performed by: STUDENT IN AN ORGANIZED HEALTH CARE EDUCATION/TRAINING PROGRAM

## 2022-05-12 PROCEDURE — 97110 THERAPEUTIC EXERCISES: CPT | Mod: CQ

## 2022-05-12 PROCEDURE — 80053 COMPREHEN METABOLIC PANEL: CPT | Performed by: NURSE PRACTITIONER

## 2022-05-12 RX ORDER — AMIODARONE HYDROCHLORIDE 200 MG/1
200 TABLET ORAL 2 TIMES DAILY
Status: DISCONTINUED | OUTPATIENT
Start: 2022-05-12 | End: 2022-05-16

## 2022-05-12 RX ORDER — MIDODRINE HYDROCHLORIDE 5 MG/1
5 TABLET ORAL 2 TIMES DAILY WITH MEALS
Status: DISCONTINUED | OUTPATIENT
Start: 2022-05-12 | End: 2022-05-14

## 2022-05-12 RX ORDER — METOPROLOL TARTRATE 25 MG/1
25 TABLET, FILM COATED ORAL 2 TIMES DAILY
Status: DISCONTINUED | OUTPATIENT
Start: 2022-05-12 | End: 2022-05-18 | Stop reason: HOSPADM

## 2022-05-12 RX ADMIN — IPRATROPIUM BROMIDE AND ALBUTEROL SULFATE 3 ML: 2.5; .5 SOLUTION RESPIRATORY (INHALATION) at 07:05

## 2022-05-12 RX ADMIN — RIFAXIMIN 550 MG: 550 TABLET ORAL at 09:05

## 2022-05-12 RX ADMIN — METOPROLOL TARTRATE 25 MG: 25 TABLET, FILM COATED ORAL at 09:05

## 2022-05-12 RX ADMIN — FAMOTIDINE 20 MG: 20 TABLET ORAL at 09:05

## 2022-05-12 RX ADMIN — MICONAZOLE NITRATE 2 % TOPICAL POWDER: at 09:05

## 2022-05-12 RX ADMIN — DOPAMINE HYDROCHLORIDE IN DEXTROSE 5 MCG/KG/MIN: 3.2 INJECTION, SOLUTION INTRAVENOUS at 07:05

## 2022-05-12 RX ADMIN — AMIODARONE HYDROCHLORIDE 200 MG: 200 TABLET ORAL at 09:05

## 2022-05-12 RX ADMIN — LACTULOSE 20 G: 20 SOLUTION ORAL at 02:05

## 2022-05-12 RX ADMIN — IPRATROPIUM BROMIDE AND ALBUTEROL SULFATE 3 ML: 2.5; .5 SOLUTION RESPIRATORY (INHALATION) at 12:05

## 2022-05-12 RX ADMIN — LACTULOSE 20 G: 20 SOLUTION ORAL at 09:05

## 2022-05-12 RX ADMIN — AMIODARONE HYDROCHLORIDE 200 MG: 200 TABLET ORAL at 11:05

## 2022-05-12 RX ADMIN — METOPROLOL TARTRATE 50 MG: 50 TABLET, FILM COATED ORAL at 09:05

## 2022-05-12 RX ADMIN — APIXABAN 5 MG: 5 TABLET, FILM COATED ORAL at 09:05

## 2022-05-12 RX ADMIN — MUPIROCIN: 20 OINTMENT TOPICAL at 09:05

## 2022-05-12 RX ADMIN — IPRATROPIUM BROMIDE AND ALBUTEROL SULFATE 3 ML: 2.5; .5 SOLUTION RESPIRATORY (INHALATION) at 01:05

## 2022-05-12 RX ADMIN — BUPROPION HYDROCHLORIDE 75 MG: 75 TABLET, FILM COATED ORAL at 09:05

## 2022-05-12 RX ADMIN — APIXABAN 2.5 MG: 5 TABLET, FILM COATED ORAL at 09:05

## 2022-05-12 RX ADMIN — AMIODARONE HYDROCHLORIDE 0.5 MG/MIN: 1.8 INJECTION, SOLUTION INTRAVENOUS at 02:05

## 2022-05-12 RX ADMIN — QUETIAPINE FUMARATE 100 MG: 100 TABLET ORAL at 10:05

## 2022-05-12 RX ADMIN — METOLAZONE 5 MG: 5 TABLET ORAL at 09:05

## 2022-05-12 RX ADMIN — SILVER SULFADIAZINE: 10 CREAM TOPICAL at 12:05

## 2022-05-12 RX ADMIN — LACTULOSE 20 G: 20 SOLUTION ORAL at 04:05

## 2022-05-12 RX ADMIN — QUETIAPINE FUMARATE 100 MG: 100 TABLET ORAL at 09:05

## 2022-05-12 RX ADMIN — BUMETANIDE 2 MG: 0.25 INJECTION, SOLUTION INTRAMUSCULAR; INTRAVENOUS at 09:05

## 2022-05-12 RX ADMIN — POLYETHYLENE GLYCOL 3350 17 G: 17 POWDER, FOR SOLUTION ORAL at 09:05

## 2022-05-12 RX ADMIN — MIDODRINE HYDROCHLORIDE 5 MG: 5 TABLET ORAL at 04:05

## 2022-05-12 NOTE — ASSESSMENT & PLAN NOTE
- pt is severely decompensated  - he is allergic to lasix  - has been receiving IVP bumex 1mg bid  - unable to get bumex infusion due to national Bumex shortage  - increase IVP bumex to 2mg bid  - continue dopamine gtt  - daily weights, strict I&Os, low sodium diet    5/12/2022:  - 24 hour UOP net negative 4.5L  - Continue severe anasarca, albumin 3.2  - Continue dopamine at 2.5 and IV bumex 2mg BID  - BMP in am

## 2022-05-12 NOTE — PROGRESS NOTES
[facility] [place] [facility]\""  Cardiology  Progress Note    Patient Name: Modesto Patrick  MRN: [id number]  Admission Date: 5/9/2022  Hospital Length of Stay: 3 days  Code Status: DNR   Attending Physician: Neeta Solis MD   Primary Care Physician: Primary Doctor No  Expected Discharge Date:   Principal Problem:Biventricular congestive heart failure    Subjective:     Hospital Course:   No notes on file    Interval History: Patient remains in afib, HR now controlled. IV amiodarone has been transitioned to PO and dopamine decreased to 2.5. BP 80s/50s. 24 hour UOP net negative 4.5L.    Review of Systems   Constitutional: Negative for diaphoresis.   Cardiovascular:  Positive for chest pain, dyspnea on exertion, leg swelling and orthopnea. Negative for syncope.   Respiratory:  Positive for shortness of breath.    Gastrointestinal:  Negative for nausea and vomiting.   Objective:     Vital Signs (Most Recent):  Temp: 97.6 °F (36.4 °C) (05/12/22 1100)  Pulse: 81 (05/12/22 1315)  Resp: (!) 21 (05/12/22 1315)  BP: (!) 84/56 (05/12/22 1315)  SpO2: 96 % (05/12/22 1315)   Vital Signs (24h Range):  Temp:  [97.6 °F (36.4 °C)-98.4 °F (36.9 °C)] 97.6 °F (36.4 °C)  Pulse:  [] 81  Resp:  [6-26] 21  SpO2:  [73 %-100 %] 96 %  BP: ()/(37-89) 84/56     Weight: 124 kg (273 lb 5.9 oz)  Body mass index is 34.17 kg/m².     SpO2: 96 %  O2 Device (Oxygen Therapy): nasal cannula      Intake/Output Summary (Last 24 hours) at 5/12/2022 1349  Last data filed at 5/12/2022 1100  Gross per 24 hour   Intake 659 ml   Output 5200 ml   Net -4541 ml       Lines/Drains/Airways       Drain  Duration                  Urethral Catheter 05/06/22 6 days              Peripheral Intravenous Line  Duration                  Peripheral IV - Single Lumen 22 G Posterior;Right Wrist -- days                    Physical Exam  Constitutional:       General: He is not in acute distress.     Appearance: Normal appearance. He is ill-appearing.    Cardiovascular:      Rate and Rhythm: Normal rate. Rhythm irregular.      Pulses: Normal pulses.      Heart sounds: Normal heart sounds.   Pulmonary:      Effort: Pulmonary effort is normal.      Breath sounds: Rales present.   Musculoskeletal:      Comments: Anasarca   Neurological:      Mental Status: He is alert. Mental status is at baseline.       Significant Labs: ABG: No results for input(s): PH, PCO2, HCO3, POCSATURATED, BE in the last 48 hours., Blood Culture: No results for input(s): LABBLOO in the last 48 hours., BMP:   Recent Labs   Lab 05/11/22  1340 05/12/22  0303   * 101    140   K 5.4* 4.9    103   CO2 31 32   BUN 90* 85*   CREATININE 3.76* 3.65*   CALCIUM 9.4 8.6   , CMP   Recent Labs   Lab 05/11/22  1340 05/12/22  0303    140   K 5.4* 4.9    103   CO2 31 32   * 101   BUN 90* 85*   CREATININE 3.76* 3.65*   CALCIUM 9.4 8.6   PROT  --  6.1*   ALBUMIN  --  3.2*   BILITOT  --  0.7   ALKPHOS  --  79   AST  --  12*   ALT  --  13*   ANIONGAP 15 10   EGFRNONAA 17* 18*   , CBC No results for input(s): WBC, HGB, HCT, PLT in the last 48 hours., Lipid Panel No results for input(s): CHOL, HDL, LDLCALC, TRIG, CHOLHDL in the last 48 hours., and Troponin No results for input(s): TROPONINI in the last 48 hours.    Significant Imaging: Echocardiogram: Transthoracic echo (TTE) complete (Cupid Only):   Results for orders placed or performed during the hospital encounter of 05/09/22   Echo   Result Value Ref Range    BSA 2.57 m2    Right Atrial Pressure (from IVC) 15 mmHg    EF 20 %    Left Ventricular Outflow Tract Mean Gradient 0.30 mmHg    AORTIC VALVE CUSP SEPERATION 15.580969020756217 cm    LVIDd 5.90 3.5 - 6.0 cm    IVS 1.22 (A) 0.6 - 1.1 cm    Posterior Wall 1.27 (A) 0.6 - 1.1 cm    Ao root annulus 3.40 cm    LVIDs 5.40 (A) 2.1 - 4.0 cm    FS 8 28 - 44 %    IVC ostium 3.0 cm    LV mass 321.10 g    LA size 4.80 cm    RVDD 5.10 cm    TAPSE 1.90 cm    Left Ventricle Relative  Wall Thickness 0.43 cm    AV mean gradient 9 mmHg    AV valve area 0.47 cm2    AV Velocity Ratio 0.20     AV index (prosthetic) 0.14     MV mean gradient 17 mmHg    MV valve area p 1/2 method 4.40 cm2    MV valve area by continuity eq 0.30 cm2    E wave deceleration time 179 msec    LVOT diameter 2.10 cm    LVOT area 3.5 cm2    LVOT peak walker 0.4 m/s    LVOT peak VTI 5.00 cm    Ao peak walker 2.0 m/s    Ao VTI 37.00 cm    LVOT stroke volume 17.31 cm3    AV peak gradient 16 mmHg    MV peak gradient 28 mmHg    TV rest pulmonary artery pressure 40 mmHg    MV Peak E Walker 1.80 m/s    TR Max Walker 2.50 m/s    MV VTI 57.0 cm    MV stenosis pressure 1/2 time 50 ms    LV Systolic Volume 141.30 mL    LV Systolic Volume Index 56.3 mL/m2    LV Diastolic Volume 173.20 mL    LV Diastolic Volume Index 69.00 mL/m2    LV Mass Index 128 g/m2    Echo EF Estimated 25 %    RA Major Axis 5.00 cm    Triscuspid Valve Regurgitation Peak Gradient 25 mmHg    Narrative    · The left ventricle is moderately enlarged with mild concentric   hypertrophy and severely decreased systolic function.  · The estimated ejection fraction is 20%.  · There is left ventricular global hypokinesis.  · Moderate right ventricular enlargement with severely reduced right   ventricular systolic function.  · Moderate right atrial enlargement.  · Moderate left atrial enlargement.  · Moderate mitral regurgitation.  · Severe tricuspid regurgitation.  · Moderate to severe pulmonic regurgitation.  · There is a bioprosthetic aortic valve present. There is no aortic   insufficiency present. Prosthetic aortic valve is normal.  · The aortic valve mean gradient is 9 mmHg with a dimensionless index of   0.14.  · Elevated central venous pressure (15 mmHg).  · The estimated PA systolic pressure is 40 mmHg.  · There are bilateral pleural effusions.       and X-Ray: CXR: X-Ray Chest 1 View (CXR): No results found for this visit on 05/09/22.    Assessment and Plan:     Brief HPI: The  patient is a 60yo AA male with a MedHX of CHF, bioprosthetic aortic valve, paroxysmal afib, chronic anticoagulation of eliquis, HTN, HLD, AAA repair, HLD, COPD on home oxygen, CKD stage III, cirrhosis and GERD who presents to LTAC for long-term care of his CHF exacerbation and acute on chronic renal failure. The patient had originally been admitted to [facility] for CHF exacerbation on 04/27, where he was also found to have afib with RVR and heme positive stools. His Eliquis was held and GI saw him and did not recommend any inpatient GI workup. He was started back on his Eliquis and IV diuresis with bumex. This was stopped after his creatinine was found to be elevated above baseline. He was started back on diuretics after his volume status was found to still be decompensated. The patient's home metoprolol dose was also increased while at [facility], and his home losartan was held.      The patient was found to be stable at [facility] and was accepted to LTAC for further monitoring/care by Dr. Solis for his acute on chronic CHF exacerbation.        5/11/22:  Cardiology has been consulted for acute CHF with hx of Biventricular failure. Echo yesterday showed EF 20%, moderate RV enlargement with severely reduced RV systolic. NTpBNP is 27K. Pt is currently on Dopamine gtt. Lactate is normal. Initial troponin is normal, will trend. Pt is anasarcic. Tele monitor shows afib RVR with -130s. Amiodarone gtt has been ordered. Pt has had 600ml of urine output since this AM. Creatinine is 3.7. Potassium is 5.4.    * Acute on chronic biventricular congestive heart failure  - pt is severely decompensated  - he is allergic to lasix  - has been receiving IVP bumex 1mg bid  - unable to get bumex infusion due to national Bumex shortage  - increase IVP bumex to 2mg bid  - continue dopamine gtt  - daily weights, strict I&Os, low sodium diet    5/12/2022:  - 24 hour UOP net negative 4.5L  - Continue severe  anasarca, albumin 3.2  - Continue dopamine at 2.5 and IV bumex 2mg BID  - BMP in am      Renal failure (ARF), acute on chronic  - creatinine is 3.7, was 2.8  - nephrology following    5/12/2022:  - Creatinine 3.65, continue monitoring    A-fib  - afib with RVR - -130s  - continue amiodarone gtt  - give one time dose of IV digoxin 500mcg now  - continue Eliquis    5/12/2022:  - HR now controlled, IV amiodarone has been transitioned to PO  - Continue Eliquis        VTE Risk Mitigation (From admission, onward)         Ordered     apixaban tablet 5 mg  2 times daily         05/12/22 0925     IP VTE HIGH RISK PATIENT  Once         05/09/22 1736     Place sequential compression device  Until discontinued         05/09/22 1736                RENEE Stevens  Cardiology  [facility]

## 2022-05-12 NOTE — PLAN OF CARE
Problem: Adult Inpatient Plan of Care  Goal: Plan of Care Review  Outcome: Ongoing, Progressing  Goal: Patient-Specific Goal (Individualized)  Outcome: Ongoing, Progressing  Goal: Absence of Hospital-Acquired Illness or Injury  Outcome: Ongoing, Progressing  Goal: Optimal Comfort and Wellbeing  Outcome: Ongoing, Progressing  Goal: Readiness for Transition of Care  Outcome: Ongoing, Progressing     Problem: Skin Injury Risk Increased  Goal: Skin Health and Integrity  Outcome: Ongoing, Progressing     Problem: Gas Exchange Impaired  Goal: Optimal Gas Exchange  Outcome: Ongoing, Progressing     Problem: Fluid and Electrolyte Imbalance (Acute Kidney Injury/Impairment)  Goal: Fluid and Electrolyte Balance  Outcome: Ongoing, Progressing     Problem: Oral Intake Inadequate (Acute Kidney Injury/Impairment)  Goal: Optimal Nutrition Intake  Outcome: Ongoing, Progressing     Problem: Renal Function Impairment (Acute Kidney Injury/Impairment)  Goal: Effective Renal Function  Outcome: Ongoing, Progressing     Problem: Impaired Wound Healing  Goal: Optimal Wound Healing  Outcome: Ongoing, Progressing     Problem: Infection  Goal: Absence of Infection Signs and Symptoms  Outcome: Ongoing, Progressing

## 2022-05-12 NOTE — PT/OT/SLP PROGRESS
Physical Therapy Treatment    Patient Name:  Modesto Patrick   MRN:  [id number]    Recommendations:     Discharge Recommendations:  intermediate care facility/nursing home, nursing facility, skilled, rehabilitation facility   Discharge Equipment Recommendations: other (see comments) (to be determined)   Barriers to discharge: ongoing medical treatment    Assessment:     Modesto Patrick is a 61 y.o. male admitted with a medical diagnosis of Biventricular congestive heart failure.  He presents with the following impairments/functional limitations:  weakness, impaired functional mobilty, impaired cognition, impaired endurance, impaired self care skills, decreased lower extremity function, edema, decreased safety awareness.    Pt able to assist with mobility much better than anticipated and performed during eval.    PT POC discussed with Duyen Kimble DPT    Rehab Prognosis: Fair; patient would benefit from acute skilled PT services to address these deficits and reach maximum level of function.    Recent Surgery: * No surgery found *      Plan:     During this hospitalization, patient to be seen 5 x/week to address the identified rehab impairments via gait training, therapeutic activities, therapeutic exercises and progress toward the following goals:    · Plan of Care Expires:  05/17/22    Subjective     Chief Complaint: congestive heart failure  Patient/Family Comments/goals: pt lethargic this AM requiring heavy verbal and tactile stimuli to rouse  Pain/Comfort:         Objective:     Communicated with Janet Willard RN prior to session.  Patient found HOB elevated with telemetry, peripheral IV, blood pressure cuff, amor catheter, pulse ox (continuous), oxygen upon PT entry to room.     General Precautions: Standard, fall   Orthopedic Precautions:N/A   Braces:    Respiratory Status: Nasal cannula, flow 4 L/min     Functional Mobility:  · Bed Mobility:     · Supine to Sit: minimum assistance  · Sit to Supine:  minimum assistance and with B LE management      AM-PAC 6 CLICK MOBILITY  Turning over in bed (including adjusting bedclothes, sheets and blankets)?: 3  Sitting down on and standing up from a chair with arms (e.g., wheelchair, bedside commode, etc.): 1  Moving from lying on back to sitting on the side of the bed?: 3  Moving to and from a bed to a chair (including a wheelchair)?: 1  Need to walk in hospital room?: 1  Climbing 3-5 steps with a railing?: 1  Basic Mobility Total Score: 10       Therapeutic Activities and Exercises:   B LE exercise 2 x 15 reps - ap, straight leg raise, hip ab/addution, heel slide with active assist    Standby assist to contact guard assist sitting EOB x 3 minutes; pt needing/attempting to lie down due to bowel urgency    Patient left HOB elevated with all lines intact and call button in reach..    GOALS:   Multidisciplinary Problems     Physical Therapy Goals        Problem: Physical Therapy    Goal Priority Disciplines Outcome Goal Variances Interventions   Physical Therapy Goal     PT, PT/OT Ongoing, Progressing     Description: Short term goals to be met by 5/24/22:  1. Supine to sit with MInimal Assistance  2. Sit to stand transfer with Moderate Assistance  3. Bed to chair transfer with Moderate Assistance using Rolling Walker    Pt to be seen for 1 week trial to assess functional potential                      Time Tracking:     PT Received On: 05/12/22  PT Start Time: 0929     PT Stop Time: 0957  PT Total Time (min): 28 min     Billable Minutes: Therapeutic Exercise 15    Treatment Type: Treatment  PT/PTA: PTA     PTA Visit Number: 1     05/12/2022

## 2022-05-12 NOTE — PROGRESS NOTES
[facility]  Nephrology  Progress Note    Patient Name: Modesto Patrick  MRN: [id number]  Admission Date: 5/9/2022  Hospital Length of Stay: 3 days  Attending Provider: Neeta Solis MD   Primary Care Physician: Primary Doctor No  Principal Problem:Biventricular congestive heart failure    Subjective:     HPI: I have been following Mr. Proctor during his hospitalization at [facility].  He has biventricular heart failure, AFib, cirrhosis, and acute on chronic renal failure.  He has significant lower extremity and scrotal edema.  Albumin is low.  He has been receiving IV albumin and IV Bumex for the past 3 days.  Creatinine has risen from 2.8 to 3.7 during that time.      Interval History:  This is a late entry note for 05/11/2022.  He was seen yesterday a.m..  He complained of shortness of breath in the a.m. which improved later in the day.  No chest pain.    Review of patient's allergies indicates:   Allergen Reactions    Lasix [furosemide]      Current Facility-Administered Medications   Medication Frequency    acetaminophen tablet 650 mg Q4H PRN    albuterol-ipratropium 2.5 mg-0.5 mg/3 mL nebulizer solution 3 mL Q6H    amiodarone tablet 200 mg BID    apixaban tablet 5 mg BID    bumetanide injection 2 mg BID    buPROPion tablet 75 mg BID    calamine-zinc oxide 8-8% solution QID PRN    DOPamine 800 mg in dextrose 5 % 250 mL infusion (premix) (NON-TITRATING) Continuous    famotidine tablet 20 mg Daily    hydrALAZINE injection 10 mg Q6H PRN    lactulose 20 gram/30 mL solution Soln 20 g QID    melatonin tablet 6 mg Nightly PRN    metOLazone tablet 5 mg Daily    metoprolol tartrate (LOPRESSOR) tablet 25 mg BID    miconazole NITRATE 2 % top powder BID    midodrine tablet 5 mg BID WM    mupirocin 2 % ointment BID    ondansetron tablet 8 mg Q8H PRN    polyethylene glycol packet 17 g Daily    prochlorperazine injection Soln 5 mg Q6H PRN    QUEtiapine tablet 100 mg BID     rifAXIMin tablet 550 mg BID    silver sulfADIAZINE 1% cream PRN       Objective:     Vital Signs (Most Recent):  Temp: 97.6 °F (36.4 °C) (05/12/22 1100)  Pulse: 81 (05/12/22 1315)  Resp: (!) 21 (05/12/22 1315)  BP: (!) 84/56 (05/12/22 1315)  SpO2: 96 % (05/12/22 1315)  O2 Device (Oxygen Therapy): nasal cannula (05/12/22 1221)   Vital Signs (24h Range):  Temp:  [97.6 °F (36.4 °C)-98.4 °F (36.9 °C)] 97.6 °F (36.4 °C)  Pulse:  [] 81  Resp:  [6-26] 21  SpO2:  [73 %-100 %] 96 %  BP: ()/(37-89) 84/56     Weight: 124 kg (273 lb 5.9 oz) (05/12/22 0500)  Body mass index is 34.17 kg/m².  Body surface area is 2.56 meters squared.    I/O last 3 completed shifts:  In: 659 [P.O.:120; I.V.:539]  Out: 4850 [Urine:4850]    Physical Exam  Eyes:      Pupils: Pupils are equal, round, and reactive to light.   Cardiovascular:      Rate and Rhythm: Tachycardia present. Rhythm irregular.   Pulmonary:      Breath sounds: No wheezing or rales.   Abdominal:      Tenderness: There is no abdominal tenderness.   Genitourinary:     Comments: Scrotal swelling  Musculoskeletal:      Cervical back: Neck supple.      Right lower leg: Edema present.      Left lower leg: Edema present.   Neurological:      Mental Status: He is alert.       Significant Labs:  BMP:   Recent Labs   Lab 05/12/22  0303         K 4.9      CO2 32   BUN 85*   CREATININE 3.65*   CALCIUM 8.6     CBC:   Recent Labs   Lab 05/10/22  0342   WBC 6.89   RBC 2.97*   HGB 9.0*   HCT 29.0*   *   MCV 97.6*   MCH 30.3   MCHC 31.0*        Significant Imaging:      Assessment/Plan:     * Acute on chronic biventricular congestive heart failure    R and L ventricular failure with cirrhosis    Renal failure (ARF), acute on chronic  Creatinine has increased with attempts at diuresis. If it rises further,diuretic should be decreased or albumin resumed. He has significant RV dysfunction and elevated R sided pressures. Monitor I/O    HTN (hypertension)  BP low.   Dopamine has been started.    A-fib  Chronic Afib. Tends to get hypotensive with treatment with beta blocker or Ca channel blocker    CKD (chronic kidney disease)  CKD 3        Thank you for your consult.     Jaiden Grant MD  Nephrology  [facility]

## 2022-05-12 NOTE — ASSESSMENT & PLAN NOTE
- continue eliquis 2.5mg bid  - continue amiodarone 200mg bid  - telemetry      05/11  - on amiodarone and lopressor   -continue eliquis    05/12 pulse in 70s   Dc amiodarone drip and transition to po amiodarone

## 2022-05-12 NOTE — ASSESSMENT & PLAN NOTE
BP low.  He is on low-dose dopamine.  He has been on metoprolol for AFib with RVR.  His blood pressure does not tolerate moderate doses.  This will be decreased to 25 mg b.i.d.

## 2022-05-12 NOTE — SUBJECTIVE & OBJECTIVE
Interval History:  He denies shortness of breath at rest.  No chest pain.  He remains on low-dose dopamine for hypotension.  Systolic pressure 87    Review of patient's allergies indicates:   Allergen Reactions    Lasix [furosemide]      Current Facility-Administered Medications   Medication Frequency    acetaminophen tablet 650 mg Q4H PRN    albuterol-ipratropium 2.5 mg-0.5 mg/3 mL nebulizer solution 3 mL Q6H    amiodarone tablet 200 mg BID    apixaban tablet 5 mg BID    bumetanide injection 2 mg BID    buPROPion tablet 75 mg BID    calamine-zinc oxide 8-8% solution QID PRN    DOPamine 800 mg in dextrose 5 % 250 mL infusion (premix) (NON-TITRATING) Continuous    famotidine tablet 20 mg Daily    hydrALAZINE injection 10 mg Q6H PRN    lactulose 20 gram/30 mL solution Soln 20 g QID    melatonin tablet 6 mg Nightly PRN    metOLazone tablet 5 mg Daily    metoprolol tartrate (LOPRESSOR) tablet 25 mg BID    miconazole NITRATE 2 % top powder BID    midodrine tablet 5 mg BID WM    mupirocin 2 % ointment BID    ondansetron tablet 8 mg Q8H PRN    polyethylene glycol packet 17 g Daily    prochlorperazine injection Soln 5 mg Q6H PRN    QUEtiapine tablet 100 mg BID    rifAXIMin tablet 550 mg BID    silver sulfADIAZINE 1% cream PRN       Objective:     Vital Signs (Most Recent):  Temp: 97.6 °F (36.4 °C) (05/12/22 1100)  Pulse: 81 (05/12/22 1315)  Resp: (!) 21 (05/12/22 1315)  BP: (!) 84/56 (05/12/22 1315)  SpO2: 96 % (05/12/22 1315)  O2 Device (Oxygen Therapy): nasal cannula (05/12/22 1221)   Vital Signs (24h Range):  Temp:  [97.6 °F (36.4 °C)-98.4 °F (36.9 °C)] 97.6 °F (36.4 °C)  Pulse:  [] 81  Resp:  [6-26] 21  SpO2:  [73 %-100 %] 96 %  BP: ()/(37-89) 84/56     Weight: 124 kg (273 lb 5.9 oz) (05/12/22 0500)  Body mass index is 34.17 kg/m².  Body surface area is 2.56 meters squared.    I/O last 3 completed shifts:  In: 659 [P.O.:120; I.V.:539]  Out: 4850 [Urine:4850]    Physical Exam  Eyes:      Pupils: Pupils are  equal, round, and reactive to light.   Cardiovascular:      Rate and Rhythm: Tachycardia present. Rhythm irregular.   Pulmonary:      Breath sounds: No wheezing or rales.   Abdominal:      Tenderness: There is no abdominal tenderness.   Genitourinary:     Comments: Scrotal swelling  Musculoskeletal:      Cervical back: Neck supple.      Right lower leg: Edema present.      Left lower leg: Edema present.   Neurological:      Mental Status: He is alert.       Significant Labs:  BMP:   Recent Labs   Lab 05/12/22  0303         K 4.9      CO2 32   BUN 85*   CREATININE 3.65*   CALCIUM 8.6     CBC:   Recent Labs   Lab 05/10/22  0342   WBC 6.89   RBC 2.97*   HGB 9.0*   HCT 29.0*   *   MCV 97.6*   MCH 30.3   MCHC 31.0*        Significant Imaging:

## 2022-05-12 NOTE — SUBJECTIVE & OBJECTIVE
Interval History: Patient remains in afib, HR now controlled. IV amiodarone has been transitioned to PO and dopamine decreased to 2.5. BP 80s/50s. 24 hour UOP net negative 4.5L.    Review of Systems   Constitutional: Negative for diaphoresis.   Cardiovascular:  Positive for chest pain, dyspnea on exertion, leg swelling and orthopnea. Negative for syncope.   Respiratory:  Positive for shortness of breath.    Gastrointestinal:  Negative for nausea and vomiting.   Objective:     Vital Signs (Most Recent):  Temp: 97.6 °F (36.4 °C) (05/12/22 1100)  Pulse: 81 (05/12/22 1315)  Resp: (!) 21 (05/12/22 1315)  BP: (!) 84/56 (05/12/22 1315)  SpO2: 96 % (05/12/22 1315)   Vital Signs (24h Range):  Temp:  [97.6 °F (36.4 °C)-98.4 °F (36.9 °C)] 97.6 °F (36.4 °C)  Pulse:  [] 81  Resp:  [6-26] 21  SpO2:  [73 %-100 %] 96 %  BP: ()/(37-89) 84/56     Weight: 124 kg (273 lb 5.9 oz)  Body mass index is 34.17 kg/m².     SpO2: 96 %  O2 Device (Oxygen Therapy): nasal cannula      Intake/Output Summary (Last 24 hours) at 5/12/2022 1349  Last data filed at 5/12/2022 1100  Gross per 24 hour   Intake 659 ml   Output 5200 ml   Net -4541 ml       Lines/Drains/Airways       Drain  Duration                  Urethral Catheter 05/06/22 6 days              Peripheral Intravenous Line  Duration                  Peripheral IV - Single Lumen 22 G Posterior;Right Wrist -- days                    Physical Exam  Constitutional:       General: He is not in acute distress.     Appearance: Normal appearance. He is ill-appearing.   Cardiovascular:      Rate and Rhythm: Normal rate. Rhythm irregular.      Pulses: Normal pulses.      Heart sounds: Normal heart sounds.   Pulmonary:      Effort: Pulmonary effort is normal.      Breath sounds: Rales present.   Musculoskeletal:      Comments: Anasarca   Neurological:      Mental Status: He is alert. Mental status is at baseline.       Significant Labs: ABG: No results for input(s): PH, PCO2, HCO3,  POCSATURATED, BE in the last 48 hours., Blood Culture: No results for input(s): LABBLOO in the last 48 hours., BMP:   Recent Labs   Lab 05/11/22  1340 05/12/22  0303   * 101    140   K 5.4* 4.9    103   CO2 31 32   BUN 90* 85*   CREATININE 3.76* 3.65*   CALCIUM 9.4 8.6   , CMP   Recent Labs   Lab 05/11/22  1340 05/12/22  0303    140   K 5.4* 4.9    103   CO2 31 32   * 101   BUN 90* 85*   CREATININE 3.76* 3.65*   CALCIUM 9.4 8.6   PROT  --  6.1*   ALBUMIN  --  3.2*   BILITOT  --  0.7   ALKPHOS  --  79   AST  --  12*   ALT  --  13*   ANIONGAP 15 10   EGFRNONAA 17* 18*   , CBC No results for input(s): WBC, HGB, HCT, PLT in the last 48 hours., Lipid Panel No results for input(s): CHOL, HDL, LDLCALC, TRIG, CHOLHDL in the last 48 hours., and Troponin No results for input(s): TROPONINI in the last 48 hours.    Significant Imaging: Echocardiogram: Transthoracic echo (TTE) complete (Cupid Only):   Results for orders placed or performed during the hospital encounter of 05/09/22   Echo   Result Value Ref Range    BSA 2.57 m2    Right Atrial Pressure (from IVC) 15 mmHg    EF 20 %    Left Ventricular Outflow Tract Mean Gradient 0.30 mmHg    AORTIC VALVE CUSP SEPERATION [id number] cm    LVIDd 5.90 3.5 - 6.0 cm    IVS 1.22 (A) 0.6 - 1.1 cm    Posterior Wall 1.27 (A) 0.6 - 1.1 cm    Ao root annulus 3.40 cm    LVIDs 5.40 (A) 2.1 - 4.0 cm    FS 8 28 - 44 %    IVC ostium 3.0 cm    LV mass 321.10 g    LA size 4.80 cm    RVDD 5.10 cm    TAPSE 1.90 cm    Left Ventricle Relative Wall Thickness 0.43 cm    AV mean gradient 9 mmHg    AV valve area 0.47 cm2    AV Velocity Ratio 0.20     AV index (prosthetic) 0.14     MV mean gradient 17 mmHg    MV valve area p 1/2 method 4.40 cm2    MV valve area by continuity eq 0.30 cm2    E wave deceleration time 179 msec    LVOT diameter 2.10 cm    LVOT area 3.5 cm2    LVOT peak ese 0.4 m/s    LVOT peak VTI 5.00 cm    Ao peak ese 2.0 m/s    Ao VTI 37.00 cm     LVOT stroke volume 17.31 cm3    AV peak gradient 16 mmHg    MV peak gradient 28 mmHg    TV rest pulmonary artery pressure 40 mmHg    MV Peak E Walker 1.80 m/s    TR Max Walker 2.50 m/s    MV VTI 57.0 cm    MV stenosis pressure 1/2 time 50 ms    LV Systolic Volume 141.30 mL    LV Systolic Volume Index 56.3 mL/m2    LV Diastolic Volume 173.20 mL    LV Diastolic Volume Index 69.00 mL/m2    LV Mass Index 128 g/m2    Echo EF Estimated 25 %    RA Major Axis 5.00 cm    Triscuspid Valve Regurgitation Peak Gradient 25 mmHg    Narrative    · The left ventricle is moderately enlarged with mild concentric   hypertrophy and severely decreased systolic function.  · The estimated ejection fraction is 20%.  · There is left ventricular global hypokinesis.  · Moderate right ventricular enlargement with severely reduced right   ventricular systolic function.  · Moderate right atrial enlargement.  · Moderate left atrial enlargement.  · Moderate mitral regurgitation.  · Severe tricuspid regurgitation.  · Moderate to severe pulmonic regurgitation.  · There is a bioprosthetic aortic valve present. There is no aortic   insufficiency present. Prosthetic aortic valve is normal.  · The aortic valve mean gradient is 9 mmHg with a dimensionless index of   0.14.  · Elevated central venous pressure (15 mmHg).  · The estimated PA systolic pressure is 40 mmHg.  · There are bilateral pleural effusions.       and X-Ray: CXR: X-Ray Chest 1 View (CXR): No results found for this visit on 05/09/22.

## 2022-05-12 NOTE — ASSESSMENT & PLAN NOTE
- afib with RVR - -130s  - continue amiodarone gtt  - give one time dose of IV digoxin 500mcg now  - continue Eliquis    5/12/2022:  - HR now controlled, IV amiodarone has been transitioned to PO  - Continue Eliquis

## 2022-05-12 NOTE — SUBJECTIVE & OBJECTIVE
Interval History: 5/12 Mr. Patrick had afib with RVR and HF on 05/11. Required IV amiodarone and dopamine. Heart rate this am is in 70s - will dc amio drip and transition to amiodarone po. States he is feeling about the same.    Review of Systems  Objective:     Vital Signs (Most Recent):  Temp: 98.4 °F (36.9 °C) (05/12/22 0400)  Pulse: 76 (05/12/22 1221)  Resp: (!) 21 (05/12/22 1221)  BP: (!) 88/50 (05/12/22 0845)  SpO2: (!) 94 % (05/12/22 1221)   Vital Signs (24h Range):  Temp:  [98.1 °F (36.7 °C)-98.4 °F (36.9 °C)] 98.4 °F (36.9 °C)  Pulse:  [] 76  Resp:  [12-26] 21  SpO2:  [73 %-100 %] 94 %  BP: ()/(44-89) 88/50     Weight: 124 kg (273 lb 5.9 oz)  Body mass index is 34.17 kg/m².    Intake/Output Summary (Last 24 hours) at 5/12/2022 1245  Last data filed at 5/12/2022 1100  Gross per 24 hour   Intake 659 ml   Output 5200 ml   Net -4541 ml      Physical Exam    Significant Labs: All pertinent labs within the past 24 hours have been reviewed.  Recent Lab Results  (Last 5 results in the past 24 hours)        05/12/22  1227   05/12/22  0303   05/11/22  1943   05/11/22  1550   05/11/22  1340        Albumin/Globulin Ratio   1.1             Albumin   3.2             Alkaline Phosphatase   79             ALT   13             Anion Gap   10       15       AST   12             BILIRUBIN TOTAL   0.7             BUN   85       90       BUN/CREAT RATIO   23       24       Calcium   8.6       9.4       Chloride   103       101       CO2   32       31       Creatinine   3.65       3.76       eGFR if non    18       17       Globulin, Total   2.9             Glucose   101       111       POC Glucose 128               Potassium   4.9       5.4       PROTEIN TOTAL   6.1             Sodium   140       142       Troponin I High Sensitivity     24.6   27.4                                Significant Imaging: I have reviewed all pertinent imaging results/findings within the past 24 hours.

## 2022-05-12 NOTE — PT/OT/SLP PROGRESS
Occupational Therapy      Patient Name:  Modesto Patrick   MRN:  [id number]    Patient not seen today secondary to Transfer to ICU, await clearance, Patient ill .  Pt is in V-tach, has elevated HR and is not medically stable for OT services today. Will follow-up 5/12/2022.    5/11/2022

## 2022-05-12 NOTE — PROGRESS NOTES
[facility] Medicine  Progress Note    Patient Name: Modesto Patrick  MRN: [id number]  Patient Class: IP- Inpatient   Admission Date: 5/9/2022  Length of Stay: 3 days  Attending Physician: Neeta Solis MD  Primary Care Provider: Primary Doctor No        Subjective:     Principal Problem:Biventricular congestive heart failure        HPI:  The patient is a 60yo AA male with a MedHX of CHF, paroxysmal afib, chronic anticoagulation of eliquis, HTN, HLD, AAA repair, HLD, COPD on home oxygen, CKD stage III, cirrhosis and GERD who presents to LTAC for long-term care of his CHF exacerbation and acute on chronic renal failure. The patient had originally been admitted to [facility] for CHF exacerbation on 04/27, where he was also found to have afib with RVR and heme positive stools. His Eliquis was held and GI saw him and did not recommend any inpatient GI workup. He was started back on his Eliquis and IV diuresis with bumex. This was stopped after his creatinine was found to be elevated above baseline. He was started back on diuretics after his volume status was found to still be decompensated. The patient's home metoprolol dose was also increased while at [facility], and his home losartan, was held.    The patient was found to be stable at [facility] and was accepted to LTAC for further monitoring/care by Dr. Solis for his acute on chronic CHF exacerbation.      Overview/Hospital Course:  05/11 Awake and resting in bed. Having sob. Edematous.  Sat is 96%. Vitals signs stable. Recheck potassium level today.  05/12 Mr. Patrick had afib with RVR and HF on 05/11. Required IV amiodarone and dopamine. Heart rate this am is in 70s - will dc amio drip and transition to amiodarone po. States he is feeling about the same.      Interval History: 5/12 Mr. Patrick had afib with RVR and HF on 05/11. Required IV amiodarone and dopamine. Heart rate this am is in 70s - will dc amio drip  and transition to amiodarone po. States he is feeling about the same.    Review of Systems  Objective:     Vital Signs (Most Recent):  Temp: 98.4 °F (36.9 °C) (05/12/22 0400)  Pulse: 76 (05/12/22 1221)  Resp: (!) 21 (05/12/22 1221)  BP: (!) 88/50 (05/12/22 0845)  SpO2: (!) 94 % (05/12/22 1221)   Vital Signs (24h Range):  Temp:  [98.1 °F (36.7 °C)-98.4 °F (36.9 °C)] 98.4 °F (36.9 °C)  Pulse:  [] 76  Resp:  [12-26] 21  SpO2:  [73 %-100 %] 94 %  BP: ()/(44-89) 88/50     Weight: 124 kg (273 lb 5.9 oz)  Body mass index is 34.17 kg/m².    Intake/Output Summary (Last 24 hours) at 5/12/2022 1245  Last data filed at 5/12/2022 1100  Gross per 24 hour   Intake 659 ml   Output 5200 ml   Net -4541 ml      Physical Exam    Significant Labs: All pertinent labs within the past 24 hours have been reviewed.  Recent Lab Results  (Last 5 results in the past 24 hours)        05/12/22  1227   05/12/22  0303   05/11/22  1943   05/11/22  1550   05/11/22  1340        Albumin/Globulin Ratio   1.1             Albumin   3.2             Alkaline Phosphatase   79             ALT   13             Anion Gap   10       15       AST   12             BILIRUBIN TOTAL   0.7             BUN   85       90       BUN/CREAT RATIO   23       24       Calcium   8.6       9.4       Chloride   103       101       CO2   32       31       Creatinine   3.65       3.76       eGFR if non    18       17       Globulin, Total   2.9             Glucose   101       111       POC Glucose 128               Potassium   4.9       5.4       PROTEIN TOTAL   6.1             Sodium   140       142       Troponin I High Sensitivity     24.6   27.4                                Significant Imaging: I have reviewed all pertinent imaging results/findings within the past 24 hours.      Assessment/Plan:      * Acute on chronic biventricular congestive heart failure  - Continuous oxygen  - Continue bumetinide 1mg IV bid  - duonebs  - Cardiology consult  -  ECHO pending  - telemetry  - continue metoprolol 50mg bid   - daily weights  - daily I+O  - low sodium diet  - dietary consult      05/11 sob this morning  02 per nc   Cardiology consulted       05/12 continue diuresis   Continue dopamine drip    GERD (gastroesophageal reflux disease)  - famotidine injection 20mg bID      Multiple wounds of skin  - has bilateral chronic lower extremity skin changes/wounds  - wound care consult       Depression  - continue buproprion      Cirrhosis  - continue rifaximin 550mg bid  - continue lactulose 20mg qid      HTN (hypertension)  - continue metoprolol 50mg bid  -continue lopressor          A-fib  - continue eliquis 2.5mg bid  - continue amiodarone 200mg bid  - telemetry      05/11  - on amiodarone and lopressor   -continue eliquis    05/12 pulse in 70s   Dc amiodarone drip and transition to po amiodarone    CKD (chronic kidney disease)  - bmp pending  - records say creatinine at baseline at 3.3 today at [facility]      COPD (chronic obstructive pulmonary disease)  - continual oxygen via NC  - duonebs q6        VTE Risk Mitigation (From admission, onward)         Ordered     apixaban tablet 5 mg  2 times daily         05/12/22 0925     IP VTE HIGH RISK PATIENT  Once         05/09/22 1736     Place sequential compression device  Until discontinued         05/09/22 1736                Discharge Planning   DIONY:      Code Status: DNR   Is the patient medically ready for discharge?:     Reason for patient still in hospital (select all that apply): Treatment  Discharge Plan A: Home with family, Home Health                  RENEE Brown  Department of Hospital Medicine   [facility]

## 2022-05-12 NOTE — PROCEDURES
Procedure performed by Donavan GARCIA under the supervision of Dr. Douglas . 5 Dutch PICC line placed in basilic vein in left upper extremity. Informed consent obtained including risks and possible complications. Patient pepped with chloro prep and draped in a sterile fashion. 2 cc 1% lidocaine infused. Utilizing U/S guidance a 40 cm 5 Dutch PICC line placed and position verified by fluoroscopy.  Was unable to pass catheter the central venous system and therefore tip of the PICC line terminates in the left subclavian vein.  PICC line flushed with heparinized saline.Statlock and bandage applied. Patient tolerated procedure well with no immediate complication.

## 2022-05-12 NOTE — ASSESSMENT & PLAN NOTE
Creatinine has increased with previous attempts at diuresis.  His creatinine is stable compared to yesterday.  He has had significant negative fluid balance past 24 hours.  I do not recommend spironolactone due to renal failure and recent hyperkalemia.  Rate of diuresis will be guided by renal function.

## 2022-05-12 NOTE — PROGRESS NOTES
[facility]  Nephrology  Progress Note    Patient Name: Modesto Patrick  MRN: [id number]  Admission Date: 5/9/2022  Hospital Length of Stay: 3 days  Attending Provider: Neeta Solis MD   Primary Care Physician: Primary Doctor No  Principal Problem:Biventricular congestive heart failure    Subjective:     HPI: I have been following Mr. Patrick during his hospitalization at [facility].  He has biventricular heart failure, AFib, cirrhosis, and acute on chronic renal failure.  He has significant lower extremity and scrotal edema.  Albumin is low.  He has been receiving IV albumin and IV Bumex for the past 3 days.  Creatinine has risen from 2.8 to 3.7 during that time.      Interval History:  He denies shortness of breath at rest.  No chest pain.  He remains on low-dose dopamine for hypotension.  Systolic pressure 87    Review of patient's allergies indicates:   Allergen Reactions    Lasix [furosemide]      Current Facility-Administered Medications   Medication Frequency    acetaminophen tablet 650 mg Q4H PRN    albuterol-ipratropium 2.5 mg-0.5 mg/3 mL nebulizer solution 3 mL Q6H    amiodarone tablet 200 mg BID    apixaban tablet 5 mg BID    bumetanide injection 2 mg BID    buPROPion tablet 75 mg BID    calamine-zinc oxide 8-8% solution QID PRN    DOPamine 800 mg in dextrose 5 % 250 mL infusion (premix) (NON-TITRATING) Continuous    famotidine tablet 20 mg Daily    hydrALAZINE injection 10 mg Q6H PRN    lactulose 20 gram/30 mL solution Soln 20 g QID    melatonin tablet 6 mg Nightly PRN    metOLazone tablet 5 mg Daily    metoprolol tartrate (LOPRESSOR) tablet 25 mg BID    miconazole NITRATE 2 % top powder BID    midodrine tablet 5 mg BID WM    mupirocin 2 % ointment BID    ondansetron tablet 8 mg Q8H PRN    polyethylene glycol packet 17 g Daily    prochlorperazine injection Soln 5 mg Q6H PRN    QUEtiapine tablet 100 mg BID    rifAXIMin tablet 550 mg BID    silver  sulfADIAZINE 1% cream PRN       Objective:     Vital Signs (Most Recent):  Temp: 97.6 °F (36.4 °C) (05/12/22 1100)  Pulse: 81 (05/12/22 1315)  Resp: (!) 21 (05/12/22 1315)  BP: (!) 84/56 (05/12/22 1315)  SpO2: 96 % (05/12/22 1315)  O2 Device (Oxygen Therapy): nasal cannula (05/12/22 1221)   Vital Signs (24h Range):  Temp:  [97.6 °F (36.4 °C)-98.4 °F (36.9 °C)] 97.6 °F (36.4 °C)  Pulse:  [] 81  Resp:  [6-26] 21  SpO2:  [73 %-100 %] 96 %  BP: ()/(37-89) 84/56     Weight: 124 kg (273 lb 5.9 oz) (05/12/22 0500)  Body mass index is 34.17 kg/m².  Body surface area is 2.56 meters squared.    I/O last 3 completed shifts:  In: 659 [P.O.:120; I.V.:539]  Out: 4850 [Urine:4850]    Physical Exam  Eyes:      Pupils: Pupils are equal, round, and reactive to light.   Cardiovascular:      Rate and Rhythm: Tachycardia present. Rhythm irregular.   Pulmonary:      Breath sounds: No wheezing or rales.   Abdominal:      Tenderness: There is no abdominal tenderness.   Genitourinary:     Comments: Scrotal swelling  Musculoskeletal:      Cervical back: Neck supple.      Right lower leg: Edema present.      Left lower leg: Edema present.   Neurological:      Mental Status: He is alert.       Significant Labs:  BMP:   Recent Labs   Lab 05/12/22  0303         K 4.9      CO2 32   BUN 85*   CREATININE 3.65*   CALCIUM 8.6     CBC:   Recent Labs   Lab 05/10/22  0342   WBC 6.89   RBC 2.97*   HGB 9.0*   HCT 29.0*   *   MCV 97.6*   MCH 30.3   MCHC 31.0*        Significant Imaging:      Assessment/Plan:     * Acute on chronic biventricular congestive heart failure    R and L ventricular failure with cirrhosis.      Renal failure (ARF), acute on chronic  Creatinine has increased with previous attempts at diuresis.  His creatinine is stable compared to yesterday.  He has had significant negative fluid balance past 24 hours.  I do not recommend spironolactone due to renal failure and recent hyperkalemia.  Rate of  diuresis will be guided by renal function.    Cirrhosis  Secondary to right heart failure and previous ethanol use    HTN (hypertension)  BP low.  He is on low-dose dopamine.  He has been on metoprolol for AFib with RVR.  His blood pressure does not tolerate moderate doses.  This will be decreased to 25 mg b.i.d.    A-fib  Chronic Afib.  Rate is controlled with amiodarone and digoxin.    CKD (chronic kidney disease)  CKD 3        Thank you for your consult.     Jaiden Grant MD  Nephrology  [facility]

## 2022-05-12 NOTE — PT/OT/SLP PROGRESS
Occupational Therapy      Patient Name:  Modesto Patrick   MRN:  [id number]    Patient not seen today secondary to Unarousable (Attempted treatment however therapist could not arouse pt enough to participate). Will follow-up 5/13/2022.    5/12/2022

## 2022-05-12 NOTE — ASSESSMENT & PLAN NOTE
- creatinine is 3.7, was 2.8  - nephrology following    5/12/2022:  - Creatinine 3.65, continue monitoring

## 2022-05-12 NOTE — SUBJECTIVE & OBJECTIVE
Interval History:  This is a late entry note for 05/11/2022.  He was seen yesterday a.m..  He complained of shortness of breath in the a.m. which improved later in the day.  No chest pain.    Review of patient's allergies indicates:   Allergen Reactions    Lasix [furosemide]      Current Facility-Administered Medications   Medication Frequency    acetaminophen tablet 650 mg Q4H PRN    albuterol-ipratropium 2.5 mg-0.5 mg/3 mL nebulizer solution 3 mL Q6H    amiodarone tablet 200 mg BID    apixaban tablet 5 mg BID    bumetanide injection 2 mg BID    buPROPion tablet 75 mg BID    calamine-zinc oxide 8-8% solution QID PRN    DOPamine 800 mg in dextrose 5 % 250 mL infusion (premix) (NON-TITRATING) Continuous    famotidine tablet 20 mg Daily    hydrALAZINE injection 10 mg Q6H PRN    lactulose 20 gram/30 mL solution Soln 20 g QID    melatonin tablet 6 mg Nightly PRN    metOLazone tablet 5 mg Daily    metoprolol tartrate (LOPRESSOR) tablet 25 mg BID    miconazole NITRATE 2 % top powder BID    midodrine tablet 5 mg BID WM    mupirocin 2 % ointment BID    ondansetron tablet 8 mg Q8H PRN    polyethylene glycol packet 17 g Daily    prochlorperazine injection Soln 5 mg Q6H PRN    QUEtiapine tablet 100 mg BID    rifAXIMin tablet 550 mg BID    silver sulfADIAZINE 1% cream PRN       Objective:     Vital Signs (Most Recent):  Temp: 97.6 °F (36.4 °C) (05/12/22 1100)  Pulse: 81 (05/12/22 1315)  Resp: (!) 21 (05/12/22 1315)  BP: (!) 84/56 (05/12/22 1315)  SpO2: 96 % (05/12/22 1315)  O2 Device (Oxygen Therapy): nasal cannula (05/12/22 1221)   Vital Signs (24h Range):  Temp:  [97.6 °F (36.4 °C)-98.4 °F (36.9 °C)] 97.6 °F (36.4 °C)  Pulse:  [] 81  Resp:  [6-26] 21  SpO2:  [73 %-100 %] 96 %  BP: ()/(37-89) 84/56     Weight: 124 kg (273 lb 5.9 oz) (05/12/22 0500)  Body mass index is 34.17 kg/m².  Body surface area is 2.56 meters squared.    I/O last 3 completed shifts:  In: 659 [P.O.:120; I.V.:539]  Out: 4850  [Urine:4850]    Physical Exam  Eyes:      Pupils: Pupils are equal, round, and reactive to light.   Cardiovascular:      Rate and Rhythm: Tachycardia present. Rhythm irregular.   Pulmonary:      Breath sounds: No wheezing or rales.   Abdominal:      Tenderness: There is no abdominal tenderness.   Genitourinary:     Comments: Scrotal swelling  Musculoskeletal:      Cervical back: Neck supple.      Right lower leg: Edema present.      Left lower leg: Edema present.   Neurological:      Mental Status: He is alert.       Significant Labs:  BMP:   Recent Labs   Lab 05/12/22  0303         K 4.9      CO2 32   BUN 85*   CREATININE 3.65*   CALCIUM 8.6     CBC:   Recent Labs   Lab 05/10/22  0342   WBC 6.89   RBC 2.97*   HGB 9.0*   HCT 29.0*   *   MCV 97.6*   MCH 30.3   MCHC 31.0*        Significant Imaging:

## 2022-05-12 NOTE — ASSESSMENT & PLAN NOTE
- Continuous oxygen  - Continue bumetinide 1mg IV bid  - dexter  - Cardiology consult  - ECHO pending  - telemetry  - continue metoprolol 50mg bid   - daily weights  - daily I+O  - low sodium diet  - dietary consult      05/11 sob this morning  02 per nc   Cardiology consulted       05/12 continue diuresis   Continue dopamine drip

## 2022-05-13 PROBLEM — Z86.79 HISTORY OF AORTIC VALVE REPAIR: Status: ACTIVE | Noted: 2022-05-13

## 2022-05-13 PROBLEM — Z98.890 HISTORY OF AORTIC VALVE REPAIR: Status: ACTIVE | Noted: 2022-05-13

## 2022-05-13 LAB
ALBUMIN SERPL BCP-MCNC: 3.3 G/DL (ref 3.5–5)
ANION GAP SERPL CALCULATED.3IONS-SCNC: 12 MMOL/L (ref 7–16)
BUN SERPL-MCNC: 95 MG/DL (ref 7–18)
BUN/CREAT SERPL: 28 (ref 6–20)
CALCIUM SERPL-MCNC: 9.3 MG/DL (ref 8.5–10.1)
CHLORIDE SERPL-SCNC: 102 MMOL/L (ref 98–107)
CO2 SERPL-SCNC: 35 MMOL/L (ref 21–32)
CREAT SERPL-MCNC: 3.35 MG/DL (ref 0.7–1.3)
GLUCOSE SERPL-MCNC: 103 MG/DL (ref 74–106)
INR BLD: 1.92 (ref 0.9–1.1)
PHOSPHATE SERPL-MCNC: 5.2 MG/DL (ref 2.5–4.5)
POTASSIUM SERPL-SCNC: 4.6 MMOL/L (ref 3.5–5.1)
PROTHROMBIN TIME: 21.6 SECONDS (ref 11.7–14.7)
SODIUM SERPL-SCNC: 144 MMOL/L (ref 136–145)

## 2022-05-13 PROCEDURE — 94640 AIRWAY INHALATION TREATMENT: CPT

## 2022-05-13 PROCEDURE — 85610 PROTHROMBIN TIME: CPT | Performed by: INTERNAL MEDICINE

## 2022-05-13 PROCEDURE — 25000003 PHARM REV CODE 250: Performed by: STUDENT IN AN ORGANIZED HEALTH CARE EDUCATION/TRAINING PROGRAM

## 2022-05-13 PROCEDURE — 27000221 HC OXYGEN, UP TO 24 HOURS

## 2022-05-13 PROCEDURE — 25000003 PHARM REV CODE 250

## 2022-05-13 PROCEDURE — 36592 COLLECT BLOOD FROM PICC: CPT

## 2022-05-13 PROCEDURE — 94761 N-INVAS EAR/PLS OXIMETRY MLT: CPT

## 2022-05-13 PROCEDURE — 25000003 PHARM REV CODE 250: Performed by: NURSE PRACTITIONER

## 2022-05-13 PROCEDURE — 99232 PR SUBSEQUENT HOSPITAL CARE,LEVL II: ICD-10-PCS | Mod: ,,, | Performed by: INTERNAL MEDICINE

## 2022-05-13 PROCEDURE — 99232 SBSQ HOSP IP/OBS MODERATE 35: CPT | Mod: ,,, | Performed by: INTERNAL MEDICINE

## 2022-05-13 PROCEDURE — 97530 THERAPEUTIC ACTIVITIES: CPT | Mod: CQ

## 2022-05-13 PROCEDURE — 63600150 PHARM REV CODE 636: Performed by: STUDENT IN AN ORGANIZED HEALTH CARE EDUCATION/TRAINING PROGRAM

## 2022-05-13 PROCEDURE — 97110 THERAPEUTIC EXERCISES: CPT | Mod: CO

## 2022-05-13 PROCEDURE — 80069 RENAL FUNCTION PANEL: CPT | Performed by: INTERNAL MEDICINE

## 2022-05-13 PROCEDURE — 36415 COLL VENOUS BLD VENIPUNCTURE: CPT | Performed by: INTERNAL MEDICINE

## 2022-05-13 PROCEDURE — 97110 THERAPEUTIC EXERCISES: CPT | Mod: CQ

## 2022-05-13 PROCEDURE — 11000008

## 2022-05-13 PROCEDURE — 25000003 PHARM REV CODE 250: Performed by: INTERNAL MEDICINE

## 2022-05-13 PROCEDURE — 25000242 PHARM REV CODE 250 ALT 637 W/ HCPCS

## 2022-05-13 RX ADMIN — LACTULOSE 20 G: 20 SOLUTION ORAL at 09:05

## 2022-05-13 RX ADMIN — METOLAZONE 5 MG: 5 TABLET ORAL at 08:05

## 2022-05-13 RX ADMIN — RIFAXIMIN 550 MG: 550 TABLET ORAL at 09:05

## 2022-05-13 RX ADMIN — QUETIAPINE FUMARATE 100 MG: 100 TABLET ORAL at 09:05

## 2022-05-13 RX ADMIN — MIDODRINE HYDROCHLORIDE 5 MG: 5 TABLET ORAL at 08:05

## 2022-05-13 RX ADMIN — MUPIROCIN: 20 OINTMENT TOPICAL at 09:05

## 2022-05-13 RX ADMIN — QUETIAPINE FUMARATE 100 MG: 100 TABLET ORAL at 08:05

## 2022-05-13 RX ADMIN — BUPROPION HYDROCHLORIDE 75 MG: 75 TABLET, FILM COATED ORAL at 08:05

## 2022-05-13 RX ADMIN — BUMETANIDE 2 MG: 0.25 INJECTION, SOLUTION INTRAMUSCULAR; INTRAVENOUS at 08:05

## 2022-05-13 RX ADMIN — BUMETANIDE 2 MG: 0.25 INJECTION, SOLUTION INTRAMUSCULAR; INTRAVENOUS at 09:05

## 2022-05-13 RX ADMIN — APIXABAN 5 MG: 5 TABLET, FILM COATED ORAL at 09:05

## 2022-05-13 RX ADMIN — MICONAZOLE NITRATE 2 % TOPICAL POWDER: at 09:05

## 2022-05-13 RX ADMIN — IPRATROPIUM BROMIDE AND ALBUTEROL SULFATE 3 ML: 2.5; .5 SOLUTION RESPIRATORY (INHALATION) at 07:05

## 2022-05-13 RX ADMIN — LACTULOSE 20 G: 20 SOLUTION ORAL at 12:05

## 2022-05-13 RX ADMIN — AMIODARONE HYDROCHLORIDE 200 MG: 200 TABLET ORAL at 09:05

## 2022-05-13 RX ADMIN — RIFAXIMIN 550 MG: 550 TABLET ORAL at 08:05

## 2022-05-13 RX ADMIN — MIDODRINE HYDROCHLORIDE 5 MG: 5 TABLET ORAL at 05:05

## 2022-05-13 RX ADMIN — LACTULOSE 20 G: 20 SOLUTION ORAL at 05:05

## 2022-05-13 RX ADMIN — FAMOTIDINE 20 MG: 20 TABLET ORAL at 08:05

## 2022-05-13 RX ADMIN — AMIODARONE HYDROCHLORIDE 200 MG: 200 TABLET ORAL at 08:05

## 2022-05-13 RX ADMIN — IPRATROPIUM BROMIDE AND ALBUTEROL SULFATE 3 ML: 2.5; .5 SOLUTION RESPIRATORY (INHALATION) at 01:05

## 2022-05-13 RX ADMIN — METOPROLOL TARTRATE 25 MG: 25 TABLET, FILM COATED ORAL at 09:05

## 2022-05-13 RX ADMIN — METOPROLOL TARTRATE 25 MG: 25 TABLET, FILM COATED ORAL at 08:05

## 2022-05-13 RX ADMIN — BUPROPION HYDROCHLORIDE 75 MG: 75 TABLET, FILM COATED ORAL at 09:05

## 2022-05-13 RX ADMIN — DOPAMINE HYDROCHLORIDE IN DEXTROSE 5 MCG/KG/MIN: 3.2 INJECTION, SOLUTION INTRAVENOUS at 06:05

## 2022-05-13 RX ADMIN — POLYETHYLENE GLYCOL 3350 17 G: 17 POWDER, FOR SOLUTION ORAL at 08:05

## 2022-05-13 RX ADMIN — IPRATROPIUM BROMIDE AND ALBUTEROL SULFATE 3 ML: 2.5; .5 SOLUTION RESPIRATORY (INHALATION) at 12:05

## 2022-05-13 RX ADMIN — APIXABAN 5 MG: 5 TABLET, FILM COATED ORAL at 08:05

## 2022-05-13 NOTE — PT/OT/SLP PROGRESS
Physical Therapy Treatment    Patient Name:  Modesto Patrick   MRN:  [id number]    Recommendations:     Discharge Recommendations:  intermediate care facility/nursing home, nursing facility, skilled, rehabilitation facility   Discharge Equipment Recommendations: other (see comments) (to be determined)   Barriers to discharge: to be determined    Assessment:     Modesto Patrick is a 61 y.o. male admitted with a medical diagnosis of Biventricular congestive heart failure.  He presents with the following impairments/functional limitations:  weakness, impaired functional mobilty, impaired cognition, impaired endurance, impaired self care skills, decreased lower extremity function, edema, decreased safety awareness.  Pt demo much improved mobility and able to transfer to recliner chair much better than anticipated.    Rehab Prognosis: Fair; patient would benefit from acute skilled PT services to address these deficits and reach maximum level of function.    Recent Surgery: * No surgery found *      Plan:     During this hospitalization, patient to be seen 5 x/week to address the identified rehab impairments via gait training, therapeutic activities, therapeutic exercises and progress toward the following goals:    · Plan of Care Expires:  05/17/22    Subjective     Chief Complaint: congestive heart failiure  Patient/Family Comments/goals: pt more alert this AM  Pain/Comfort:         Objective:     Communicated with Gabrielle Solares RN prior to session.  Patient found HOB elevated with blood pressure cuff, oxygen, peripheral IV, pulse ox (continuous), telemetry, amor catheter upon PT entry to room.     General Precautions: Standard, fall   Orthopedic Precautions:N/A   Braces:    Respiratory Status: Nasal cannula, flow 4 L/min     Functional Mobility:  · Bed Mobility:     · Supine to Sit: contact guard assistance and minimum assistance  · Transfers:     · Sit to Stand:  minimum assistance and of 1-2 persons with  rolling walker  · Bed to Chair: minimum assistance with  rolling walker  using  Step Transfer  · Gait: 3-5 steps around to chair with RW      AM-PAC 6 CLICK MOBILITY  Turning over in bed (including adjusting bedclothes, sheets and blankets)?: 3  Sitting down on and standing up from a chair with arms (e.g., wheelchair, bedside commode, etc.): 3  Moving from lying on back to sitting on the side of the bed?: 3  Moving to and from a bed to a chair (including a wheelchair)?: 3  Need to walk in hospital room?: 3 (around to recliner chair)  Climbing 3-5 steps with a railing?: 1  Basic Mobility Total Score: 16       Therapeutic Activities and Exercises:   Pt performed 30 reps (B) LE exercises: ap, quad set, glut set, straight leg raise, hip ab/adduction, long arc quad, heel slide with active assist     Standby assist to modified independent sitting EOB x 8 minutes     Minimum assist x 1-2 sit to stand x 3 trials x 30 sec    Patient left up in chair with all lines intact, call button in reach and Gabrielle Solares RN present..    GOALS:   Multidisciplinary Problems     Physical Therapy Goals        Problem: Physical Therapy    Goal Priority Disciplines Outcome Goal Variances Interventions   Physical Therapy Goal     PT, PT/OT Ongoing, Progressing     Description: Short term goals to be met by 5/24/22:  1. Supine to sit with MInimal Assistance  2. Sit to stand transfer with Moderate Assistance  3. Bed to chair transfer with Moderate Assistance using Rolling Walker    Pt to be seen for 1 week trial to assess functional potential                      Time Tracking:     PT Received On: 05/13/22  PT Start Time: 0828     PT Stop Time: 0910  PT Total Time (min): 42 min     Billable Minutes: Therapeutic Activity 8 and Therapeutic Exercise 15    Treatment Type: Treatment  PT/PTA: PTA     PTA Visit Number: 2     05/13/2022

## 2022-05-13 NOTE — SUBJECTIVE & OBJECTIVE
Interval History:  He is mildly confused.  He denies SOB.    Review of patient's allergies indicates:   Allergen Reactions    Lasix [furosemide]      Current Facility-Administered Medications   Medication Frequency    acetaminophen tablet 650 mg Q4H PRN    albuterol-ipratropium 2.5 mg-0.5 mg/3 mL nebulizer solution 3 mL Q6H    amiodarone tablet 200 mg BID    apixaban tablet 5 mg BID    bumetanide injection 2 mg BID    buPROPion tablet 75 mg BID    calamine-zinc oxide 8-8% solution QID PRN    DOPamine 800 mg in dextrose 5 % 250 mL infusion (premix) (NON-TITRATING) Continuous    famotidine tablet 20 mg Daily    hydrALAZINE injection 10 mg Q6H PRN    lactulose 20 gram/30 mL solution Soln 20 g QID    melatonin tablet 6 mg Nightly PRN    metOLazone tablet 5 mg Daily    metoprolol tartrate (LOPRESSOR) tablet 25 mg BID    miconazole NITRATE 2 % top powder BID    midodrine tablet 5 mg BID WM    mupirocin 2 % ointment BID    ondansetron tablet 8 mg Q8H PRN    polyethylene glycol packet 17 g Daily    prochlorperazine injection Soln 5 mg Q6H PRN    QUEtiapine tablet 100 mg BID    rifAXIMin tablet 550 mg BID    silver sulfADIAZINE 1% cream PRN       Objective:     Vital Signs (Most Recent):  Temp: 97.9 °F (36.6 °C) (05/13/22 1000)  Pulse: 84 (05/13/22 1400)  Resp: 19 (05/13/22 1400)  BP: 94/61 (05/13/22 1400)  SpO2: 96 % (05/13/22 1400)  O2 Device (Oxygen Therapy): nasal cannula (05/13/22 0800) Vital Signs (24h Range):  Temp:  [97.2 °F (36.2 °C)-98.6 °F (37 °C)] 97.9 °F (36.6 °C)  Pulse:  [] 84  Resp:  [11-41] 19  SpO2:  [77 %-100 %] 96 %  BP: ()/() 94/61     Weight: 122.2 kg (269 lb 6.4 oz) (05/13/22 0545)  Body mass index is 33.67 kg/m².  Body surface area is 2.54 meters squared.    I/O last 3 completed shifts:  In: 1398 [P.O.:720; I.V.:678]  Out: 9000 [Urine:9000]    Physical Exam  Eyes:      Pupils: Pupils are equal, round, and reactive to light.   Cardiovascular:      Rate and Rhythm: Tachycardia present.  Rhythm irregular.   Pulmonary:      Breath sounds: No wheezing or rales.   Abdominal:      Tenderness: There is no abdominal tenderness.   Genitourinary:     Comments: Scrotal swelling  Musculoskeletal:      Cervical back: Neck supple.      Right lower leg: Edema present.      Left lower leg: Edema present.   Neurological:      Mental Status: He is alert.       Significant Labs:  BMP:   Recent Labs   Lab 05/13/22  0710         K 4.6      CO2 35*   BUN 95*   CREATININE 3.35*   CALCIUM 9.3     CBC:   Recent Labs   Lab 05/10/22  0342   WBC 6.89   RBC 2.97*   HGB 9.0*   HCT 29.0*   *   MCV 97.6*   MCH 30.3   MCHC 31.0*        Significant Imaging:

## 2022-05-13 NOTE — PT/OT/SLP PROGRESS
Occupational Therapy   Treatment    Name: Modesto Patrick  MRN: [id number]  Admitting Diagnosis:  Biventricular congestive heart failure       Recommendations:     Discharge Recommendations: intermediate care facility/nursing home, rehabilitation facility, nursing facility, skilled  Discharge Equipment Recommendations:   (to be determined)  Barriers to discharge:       Assessment:     Modesto Patrick is a 61 y.o. male with a medical diagnosis of Biventricular congestive heart failure. Performance deficits affecting function are weakness, impaired endurance, impaired self care skills, edema (L ue edema).     Rehab Prognosis:  Fair; patient would benefit from acute skilled OT services to address these deficits and reach maximum level of function.       Plan:     Patient to be seen 5 x/week to address the above listed problems via self-care/home management, therapeutic exercises  · Plan of Care Expires: 05/17/22  · Plan of Care Reviewed with: patient    Subjective     Pain/Comfort:  · Pain Rating 1: 0/10    Objective:     Communicated with: JANETH Mendes prior to session.  Patient found up in chair with blood pressure cuff, oxygen, PICC line, telemetry, amor catheter, pulse ox (continuous) upon OT entry to room.    General Precautions: Standard, fall   Orthopedic Precautions:N/A   Braces: N/A  Respiratory Status: Nasal cannula, flow 4 L/min     Occupational Performance:     Bed Mobility:    ·     Functional Mobility/Transfers:  ·   Functional Mobility:   Activities of Daily Living:  · Set up to perform oral care,   · Set up to wash his face   · Set up to feed himself ( therapist set up pt lunch tray and pt was feeding himself with no problems as therapists left his room)     [facility] 6 Click ADL:      Treatment & Education:  Pt performed hand helper with 3 rubber band on R 20 reps x 2, rom ex's with 2 lb wt 15 reps x 2 B elbow flex/ext, 10 reps x 2 B abd/add, 10 reps shld flex, red t band 10 reps x 2 B elbow  flex/ext     Patient left up in chair with all lines intact and call button in reachEducation:      GOALS:   Multidisciplinary Problems     Occupational Therapy Goals        Problem: Occupational Therapy    Goal Priority Disciplines Outcome Interventions   Occupational Therapy Goal     OT, PT/OT Ongoing, Progressing    Description: Pt is getting a 1 week trial to determine pt's ability to participate and progress with OT services.  STG:  Pt will perform grooming with setup  Pt will bathe with min (A) for upper body anterior  Pt will perform UE dressing with setup and min(A)  Pt will sit EOB x 7 min with CGA/ min(A)  Pt will tolerate 20 minutes of tx without fatigue      LT.Restore to max I with self care and mobility.                      Time Tracking:     OT Date of Treatment: 22  OT Start Time: 0949, 1136  OT Stop Time: ,1147  OT Total Time (min): 13 min,11 mins    Billable Minutes:Therapeutic Exercise 18    OT/GILDA: GILDA          2022

## 2022-05-13 NOTE — PLAN OF CARE
Received consult for home hospice. Spoke to pts sister, Eber Patrick. She chose [facility]. Referral called and faxed to Negin with [facility]. Follow up Monday.

## 2022-05-13 NOTE — PROGRESS NOTES
[facility] Medicine  Progress Note    Patient Name: Modesto Patrick  MRN: [id number]  Patient Class: IP- Inpatient   Admission Date: 5/9/2022  Length of Stay: 4 days  Attending Physician: Neeta Solis MD  Primary Care Provider: Primary Doctor No        Subjective:     Principal Problem:Biventricular congestive heart failure        HPI:  The patient is a 60yo AA male with a MedHX of CHF, paroxysmal afib, chronic anticoagulation of eliquis, HTN, HLD, AAA repair, HLD, COPD on home oxygen, CKD stage III, cirrhosis and GERD who presents to LTAC for long-term care of his CHF exacerbation and acute on chronic renal failure. The patient had originally been admitted to [facility] for CHF exacerbation on 04/27, where he was also found to have afib with RVR and heme positive stools. His Eliquis was held and GI saw him and did not recommend any inpatient GI workup. He was started back on his Eliquis and IV diuresis with bumex. This was stopped after his creatinine was found to be elevated above baseline. He was started back on diuretics after his volume status was found to still be decompensated. The patient's home metoprolol dose was also increased while at [facility], and his home losartan, was held.    The patient was found to be stable at [facility] and was accepted to LTAC for further monitoring/care by Dr. Solis for his acute on chronic CHF exacerbation.      Overview/Hospital Course:  05/11 Awake and resting in bed. Having sob. Edematous.  Sat is 96%. Vitals signs stable. Recheck potassium level today.  05/12 Mr. Patrick had afib with RVR and HF on 05/11. Required IV amiodarone and dopamine. Heart rate this am is in 70s - will dc amio drip and transition to amiodarone po. States he is feeling about the same.      Interval History:     Pt continues to be confused, HE? Baseline unclear, per sister likely new baseline  HR controlled, had extensive GOC discussion w/  sister, apparently pt has been on hospice before, however since he was brought to the hosptial it was taken away per sister and that he was doing better. Told her that cirrhosis, heart failure and renal failure gives a poor prognosis, we are continuing to manage him medically however having three organ involvement portends grave prognosis, pt himself made it clear on admission that he wants to be dnr/dni. Sister wants to do home hospice early next week. Will consult social workers for it. For now will continue inotrope guided diuresis since he is responding well to it.     Review of Systems   Constitutional:  Positive for fatigue.   Respiratory:  Positive for shortness of breath.    Cardiovascular:  Positive for leg swelling.   Psychiatric/Behavioral:  Positive for confusion.    Objective:     Vital Signs (Most Recent):  Temp: 97.9 °F (36.6 °C) (05/13/22 1000)  Pulse: 84 (05/13/22 1212)  Resp: (!) 26 (05/13/22 1212)  BP: (!) 90/59 (05/13/22 0930)  SpO2: 95 % (05/13/22 1212)   Vital Signs (24h Range):  Temp:  [97.2 °F (36.2 °C)-98.6 °F (37 °C)] 97.9 °F (36.6 °C)  Pulse:  [] 84  Resp:  [11-41] 26  SpO2:  [77 %-100 %] 95 %  BP: ()/() 90/59     Weight: 122.2 kg (269 lb 6.4 oz)  Body mass index is 33.67 kg/m².    Intake/Output Summary (Last 24 hours) at 5/13/2022 1254  Last data filed at 5/13/2022 1000  Gross per 24 hour   Intake 499 ml   Output 5600 ml   Net -5101 ml      Physical Exam  Constitutional:       Appearance: He is ill-appearing.   Eyes:      Pupils: Pupils are equal, round, and reactive to light.   Cardiovascular:      Rate and Rhythm: Tachycardia present. Rhythm irregular.   Pulmonary:      Breath sounds: No wheezing or rales.   Abdominal:      Tenderness: There is no abdominal tenderness.   Genitourinary:     Comments: Scrotal swelling  Musculoskeletal:      Cervical back: Neck supple.      Right lower leg: Edema present.      Left lower leg: Edema present.   Neurological:      Mental  Status: He is alert. Mental status is at baseline.      Comments: Confusion/dementia/HE.        Significant Labs: All pertinent labs within the past 24 hours have been reviewed.    Significant Imaging: I have reviewed all pertinent imaging results/findings within the past 24 hours.      Assessment/Plan:      * Acute on chronic biventricular congestive heart failure  - Continuous oxygen  - Continue bumetinide 1mg IV bid  - duonebs  - Cardiology consult  - ECHO pending  - telemetry  - continue metoprolol 50mg bid   - daily weights  - daily I+O  - low sodium diet  - dietary consult      05/11 sob this morning  02 per nc   Cardiology consulted       05/12 continue diuresis   Continue dopamine drip    GERD (gastroesophageal reflux disease)  - famotidine injection 20mg bID      Multiple wounds of skin  - has bilateral chronic lower extremity skin changes/wounds  - wound care consult       Depression  - continue buproprion      Cirrhosis  - continue rifaximin 550mg bid  - continue lactulose 20mg qid      HTN (hypertension)  - continue metoprolol 50mg bid  -continue lopressor          A-fib  - continue eliquis 2.5mg bid  - continue amiodarone 200mg bid  - telemetry      05/11  - on amiodarone and lopressor   -continue eliquis    05/12 pulse in 70s   Dc amiodarone drip and transition to po amiodarone    CKD (chronic kidney disease)  - bmp pending  - records say creatinine at baseline at 3.3 today at [facility]      COPD (chronic obstructive pulmonary disease)  - continual oxygen via NC  - duonebs q6        VTE Risk Mitigation (From admission, onward)         Ordered     apixaban tablet 5 mg  2 times daily         05/12/22 0925     IP VTE HIGH RISK PATIENT  Once         05/09/22 1736     Place sequential compression device  Until discontinued         05/09/22 1736                Discharge Planning   DIONY:      Code Status: DNR   Is the patient medically ready for discharge?:     Reason for patient still in hospital (select all  that apply): Treatment  Discharge Plan A: Home with family, Home Health                  Rehmat U MD Will  Department of Hospital Medicine   [facility]

## 2022-05-13 NOTE — SUBJECTIVE & OBJECTIVE
Interval History:     Pt continues to be confused, HE? Baseline unclear, per sister likely new baseline  HR controlled, had extensive GOC discussion w/ sister, apparently pt has been on hospice before, however since he was brought to the hosptial it was taken away per sister and that he was doing better. Told her that cirrhosis, heart failure and renal failure gives a poor prognosis, we are continuing to manage him medically however having three organ involvement portends grave prognosis, pt himself made it clear on admission that he wants to be dnr/dni. Sister wants to do home hospice early next week. Will consult social workers for it. For now will continue inotrope guided diuresis since he is responding well to it.     Review of Systems   Constitutional:  Positive for fatigue.   Respiratory:  Positive for shortness of breath.    Cardiovascular:  Positive for leg swelling.   Psychiatric/Behavioral:  Positive for confusion.    Objective:     Vital Signs (Most Recent):  Temp: 97.9 °F (36.6 °C) (05/13/22 1000)  Pulse: 84 (05/13/22 1212)  Resp: (!) 26 (05/13/22 1212)  BP: (!) 90/59 (05/13/22 0930)  SpO2: 95 % (05/13/22 1212)   Vital Signs (24h Range):  Temp:  [97.2 °F (36.2 °C)-98.6 °F (37 °C)] 97.9 °F (36.6 °C)  Pulse:  [] 84  Resp:  [11-41] 26  SpO2:  [77 %-100 %] 95 %  BP: ()/() 90/59     Weight: 122.2 kg (269 lb 6.4 oz)  Body mass index is 33.67 kg/m².    Intake/Output Summary (Last 24 hours) at 5/13/2022 1254  Last data filed at 5/13/2022 1000  Gross per 24 hour   Intake 499 ml   Output 5600 ml   Net -5101 ml      Physical Exam  Constitutional:       Appearance: He is ill-appearing.   Eyes:      Pupils: Pupils are equal, round, and reactive to light.   Cardiovascular:      Rate and Rhythm: Tachycardia present. Rhythm irregular.   Pulmonary:      Breath sounds: No wheezing or rales.   Abdominal:      Tenderness: There is no abdominal tenderness.   Genitourinary:     Comments: Scrotal  swelling  Musculoskeletal:      Cervical back: Neck supple.      Right lower leg: Edema present.      Left lower leg: Edema present.   Neurological:      Mental Status: He is alert. Mental status is at baseline.      Comments: Confusion/dementia/HE.        Significant Labs: All pertinent labs within the past 24 hours have been reviewed.    Significant Imaging: I have reviewed all pertinent imaging results/findings within the past 24 hours.

## 2022-05-13 NOTE — ASSESSMENT & PLAN NOTE
- functioning properly    Per echo 5/10/22:  · There is a bioprosthetic aortic valve present. There is no aortic insufficiency present. Prosthetic aortic valve is normal.  · The aortic valve mean gradient is 9 mmHg with a dimensionless index of 0.14       same name as above

## 2022-05-13 NOTE — ASSESSMENT & PLAN NOTE
- pt is severely decompensated  - he is allergic to lasix  - has been receiving IVP bumex 1mg bid  - unable to get bumex infusion due to national Bumex shortage  - increase IVP bumex to 2mg bid  - continue dopamine gtt  - daily weights, strict I&Os, low sodium diet    5/12/2022:  - 24 hour UOP net negative 4.5L  - Continue severe anasarca, albumin 3.2  - Continue dopamine at 2.5 and IV bumex 2mg BID  - BMP in am    5/13/22:  - net negative 4.6L of UOP  - continue dopamine gtt, dose is now at 5  - continue IV bumex 2mg bid  - continue strict I&Os, daily weights  - continue to monitor creatinine and electrolytes

## 2022-05-13 NOTE — PLAN OF CARE
Problem: Adult Inpatient Plan of Care  Goal: Plan of Care Review  Outcome: Ongoing, Progressing  Flowsheets (Taken 5/13/2022 1852)  Plan of Care Reviewed With: patient  Goal: Patient-Specific Goal (Individualized)  Outcome: Ongoing, Progressing  Goal: Absence of Hospital-Acquired Illness or Injury  Outcome: Ongoing, Progressing  Intervention: Identify and Manage Fall Risk  Flowsheets (Taken 5/13/2022 1852)  Safety Promotion/Fall Prevention:   assistive device/personal item within reach   bed alarm set   diversional activities provided   Fall Risk signage in place   lighting adjusted   medications reviewed   pulse ox   side rails raised x 3     Problem: Skin Injury Risk Increased  Goal: Skin Health and Integrity  Outcome: Ongoing, Progressing     Problem: Gas Exchange Impaired  Goal: Optimal Gas Exchange  Outcome: Ongoing, Progressing     Problem: Fluid and Electrolyte Imbalance (Acute Kidney Injury/Impairment)  Goal: Fluid and Electrolyte Balance  Outcome: Ongoing, Progressing     Problem: Oral Intake Inadequate (Acute Kidney Injury/Impairment)  Goal: Optimal Nutrition Intake  Outcome: Ongoing, Progressing     Problem: Impaired Wound Healing  Goal: Optimal Wound Healing  Outcome: Ongoing, Progressing

## 2022-05-13 NOTE — ASSESSMENT & PLAN NOTE
Urine output has increased markedly.  Urine output 7.2 L yesterday.  This appears to be due to better renal perfusion with dopamine.  Metolazone is the only new diuretic he has received.  Creatinine is slightly better.

## 2022-05-13 NOTE — ASSESSMENT & PLAN NOTE
- creatinine is 3.7, was 2.8  - nephrology following    5/12/2022:  - Creatinine 3.65, continue monitoring    5/13/22:  - creatinine slightly improved today at 3.3, continue to monitor

## 2022-05-13 NOTE — PROGRESS NOTES
[facility]  Nephrology  Progress Note    Patient Name: Modesto Patrick  MRN: [id number]  Admission Date: 5/9/2022  Hospital Length of Stay: 4 days  Attending Provider: Neeta Solis MD   Primary Care Physician: Primary Doctor No  Principal Problem:Biventricular congestive heart failure    Subjective:     HPI: I have been following Mr. Patrick during his hospitalization at [facility].  He has biventricular heart failure, AFib, cirrhosis, and acute on chronic renal failure.  He has significant lower extremity and scrotal edema.  Albumin is low.  He has been receiving IV albumin and IV Bumex for the past 3 days.  Creatinine has risen from 2.8 to 3.7 during that time.      Interval History:  He is mildly confused.  He denies SOB.    Review of patient's allergies indicates:   Allergen Reactions    Lasix [furosemide]      Current Facility-Administered Medications   Medication Frequency    acetaminophen tablet 650 mg Q4H PRN    albuterol-ipratropium 2.5 mg-0.5 mg/3 mL nebulizer solution 3 mL Q6H    amiodarone tablet 200 mg BID    apixaban tablet 5 mg BID    bumetanide injection 2 mg BID    buPROPion tablet 75 mg BID    calamine-zinc oxide 8-8% solution QID PRN    DOPamine 800 mg in dextrose 5 % 250 mL infusion (premix) (NON-TITRATING) Continuous    famotidine tablet 20 mg Daily    hydrALAZINE injection 10 mg Q6H PRN    lactulose 20 gram/30 mL solution Soln 20 g QID    melatonin tablet 6 mg Nightly PRN    metOLazone tablet 5 mg Daily    metoprolol tartrate (LOPRESSOR) tablet 25 mg BID    miconazole NITRATE 2 % top powder BID    midodrine tablet 5 mg BID WM    mupirocin 2 % ointment BID    ondansetron tablet 8 mg Q8H PRN    polyethylene glycol packet 17 g Daily    prochlorperazine injection Soln 5 mg Q6H PRN    QUEtiapine tablet 100 mg BID    rifAXIMin tablet 550 mg BID    silver sulfADIAZINE 1% cream PRN       Objective:     Vital Signs (Most Recent):  Temp: 97.9 °F (36.6 °C)  (05/13/22 1000)  Pulse: 84 (05/13/22 1400)  Resp: 19 (05/13/22 1400)  BP: 94/61 (05/13/22 1400)  SpO2: 96 % (05/13/22 1400)  O2 Device (Oxygen Therapy): nasal cannula (05/13/22 0800) Vital Signs (24h Range):  Temp:  [97.2 °F (36.2 °C)-98.6 °F (37 °C)] 97.9 °F (36.6 °C)  Pulse:  [] 84  Resp:  [11-41] 19  SpO2:  [77 %-100 %] 96 %  BP: ()/() 94/61     Weight: 122.2 kg (269 lb 6.4 oz) (05/13/22 0545)  Body mass index is 33.67 kg/m².  Body surface area is 2.54 meters squared.    I/O last 3 completed shifts:  In: 1398 [P.O.:720; I.V.:678]  Out: 9000 [Urine:9000]    Physical Exam  Eyes:      Pupils: Pupils are equal, round, and reactive to light.   Cardiovascular:      Rate and Rhythm: Tachycardia present. Rhythm irregular.   Pulmonary:      Breath sounds: No wheezing or rales.   Abdominal:      Tenderness: There is no abdominal tenderness.   Genitourinary:     Comments: Scrotal swelling  Musculoskeletal:      Cervical back: Neck supple.      Right lower leg: Edema present.      Left lower leg: Edema present.   Neurological:      Mental Status: He is alert.       Significant Labs:  BMP:   Recent Labs   Lab 05/13/22  0710         K 4.6      CO2 35*   BUN 95*   CREATININE 3.35*   CALCIUM 9.3     CBC:   Recent Labs   Lab 05/10/22  0342   WBC 6.89   RBC 2.97*   HGB 9.0*   HCT 29.0*   *   MCV 97.6*   MCH 30.3   MCHC 31.0*        Significant Imaging:      Assessment/Plan:     * Acute on chronic biventricular congestive heart failure    R and L ventricular failure with cirrhosis.      Renal failure (ARF), acute on chronic  Urine output has increased markedly.  Urine output 7.2 L yesterday.  This appears to be due to better renal perfusion with dopamine.  Metolazone is the only new diuretic he has received.  Creatinine is slightly better.    Cirrhosis  Secondary to right heart failure and previous ethanol use    HTN (hypertension)  BP low.  He is on low-dose dopamine.  He has been on  metoprolol for AFib with RVR.  His blood pressure does not tolerate moderate doses.  This will be decreased to 25 mg b.i.d.    A-fib  Chronic Afib.  Rate is controlled with amiodarone and digoxin.    CKD (chronic kidney disease)  CKD 3        Thank you for your consult.     Jaiden Grant MD  Nephrology  [facility]

## 2022-05-13 NOTE — PROGRESS NOTES
[facility]  Cardiology  Progress Note    Patient Name: Modesto Patrick  MRN: [id number]  Admission Date: 5/9/2022  Hospital Length of Stay: 4 days  Code Status: DNR   Attending Physician: Neeta Solis MD   Primary Care Physician: Primary Doctor No  Expected Discharge Date:   Principal Problem:Biventricular congestive heart failure    Subjective:     Interval History: Pt sitting up in chair at bedside awake, alert. Dopamine infusion continues, net negative 4.6L UOP over last 24 hours. He remains anasarcic. Albumin is 3.3.    Review of Systems   Constitutional: Negative for diaphoresis.   Cardiovascular:  Positive for chest pain, dyspnea on exertion, leg swelling and orthopnea. Negative for syncope.   Respiratory:  Positive for shortness of breath.    Gastrointestinal:  Negative for nausea and vomiting.   Objective:     Vital Signs (Most Recent):  Temp: 97.9 °F (36.6 °C) (05/13/22 1000)  Pulse: 84 (05/13/22 1212)  Resp: (!) 26 (05/13/22 1212)  BP: (!) 90/59 (05/13/22 0930)  SpO2: 95 % (05/13/22 1212)   Vital Signs (24h Range):  Temp:  [97.2 °F (36.2 °C)-98.6 °F (37 °C)] 97.9 °F (36.6 °C)  Pulse:  [] 84  Resp:  [6-41] 26  SpO2:  [77 %-100 %] 95 %  BP: ()/() 90/59     Weight: 122.2 kg (269 lb 6.4 oz)  Body mass index is 33.67 kg/m².     SpO2: 95 %  O2 Device (Oxygen Therapy): nasal cannula      Intake/Output Summary (Last 24 hours) at 5/13/2022 1216  Last data filed at 5/13/2022 1000  Gross per 24 hour   Intake 499 ml   Output 5600 ml   Net -5101 ml       Lines/Drains/Airways       Peripherally Inserted Central Catheter Line  Duration             PICC Double Lumen 05/12/22 1520 left basilic <1 day              Drain  Duration                  Urethral Catheter 05/06/22 7 days              Peripheral Intravenous Line  Duration                  Peripheral IV - Single Lumen 22 G Posterior;Right Wrist -- days                    Physical Exam  Constitutional:       General: He is  not in acute distress.     Appearance: He is ill-appearing.   Cardiovascular:      Rate and Rhythm: Normal rate. Rhythm irregular.      Pulses: Normal pulses.      Heart sounds: Normal heart sounds.   Pulmonary:      Effort: Pulmonary effort is normal.      Breath sounds: Rales present.   Musculoskeletal:      Comments: Anasarca   Neurological:      Mental Status: He is alert. Mental status is at baseline.       Significant Labs: All pertinent lab results from the last 24 hours have been reviewed. and   Recent Lab Results         05/13/22  0710   05/12/22  1227        Albumin 3.3         Anion Gap 12         BUN 95         BUN/CREAT RATIO 28         Calcium 9.3         Chloride 102         CO2 35         Creatinine 3.35         eGFR if non African American 20         Glucose 103         Phosphorus 5.2         POC Glucose   128       Potassium 4.6         Sodium 144                 Significant Imaging: Echocardiogram: Transthoracic echo (TTE) complete (Cupid Only):   Results for orders placed or performed during the hospital encounter of 05/09/22   Echo   Result Value Ref Range    BSA 2.57 m2    Right Atrial Pressure (from IVC) 15 mmHg    EF 20 %    Left Ventricular Outflow Tract Mean Gradient 0.30 mmHg    AORTIC VALVE CUSP SEPERATION 15.781179306912309 cm    LVIDd 5.90 3.5 - 6.0 cm    IVS 1.22 (A) 0.6 - 1.1 cm    Posterior Wall 1.27 (A) 0.6 - 1.1 cm    Ao root annulus 3.40 cm    LVIDs 5.40 (A) 2.1 - 4.0 cm    FS 8 28 - 44 %    IVC ostium 3.0 cm    LV mass 321.10 g    LA size 4.80 cm    RVDD 5.10 cm    TAPSE 1.90 cm    Left Ventricle Relative Wall Thickness 0.43 cm    AV mean gradient 9 mmHg    AV valve area 0.47 cm2    AV Velocity Ratio 0.20     AV index (prosthetic) 0.14     MV mean gradient 17 mmHg    MV valve area p 1/2 method 4.40 cm2    MV valve area by continuity eq 0.30 cm2    E wave deceleration time 179 msec    LVOT diameter 2.10 cm    LVOT area 3.5 cm2    LVOT peak ese 0.4 m/s    LVOT peak VTI 5.00 cm     Ao peak walker 2.0 m/s    Ao VTI 37.00 cm    LVOT stroke volume 17.31 cm3    AV peak gradient 16 mmHg    MV peak gradient 28 mmHg    TV rest pulmonary artery pressure 40 mmHg    MV Peak E Walker 1.80 m/s    TR Max Walker 2.50 m/s    MV VTI 57.0 cm    MV stenosis pressure 1/2 time 50 ms    LV Systolic Volume 141.30 mL    LV Systolic Volume Index 56.3 mL/m2    LV Diastolic Volume 173.20 mL    LV Diastolic Volume Index 69.00 mL/m2    LV Mass Index 128 g/m2    Echo EF Estimated 25 %    RA Major Axis 5.00 cm    Triscuspid Valve Regurgitation Peak Gradient 25 mmHg    Narrative    · The left ventricle is moderately enlarged with mild concentric   hypertrophy and severely decreased systolic function.  · The estimated ejection fraction is 20%.  · There is left ventricular global hypokinesis.  · Moderate right ventricular enlargement with severely reduced right   ventricular systolic function.  · Moderate right atrial enlargement.  · Moderate left atrial enlargement.  · Moderate mitral regurgitation.  · Severe tricuspid regurgitation.  · Moderate to severe pulmonic regurgitation.  · There is a bioprosthetic aortic valve present. There is no aortic   insufficiency present. Prosthetic aortic valve is normal.  · The aortic valve mean gradient is 9 mmHg with a dimensionless index of   0.14.  · Elevated central venous pressure (15 mmHg).  · The estimated PA systolic pressure is 40 mmHg.  · There are bilateral pleural effusions.        Assessment and Plan:     Brief HPI: The patient is a 62yo AA male with a MedHX of CHF, bioprosthetic aortic valve, paroxysmal afib, chronic anticoagulation of eliquis, HTN, HLD, AAA repair, HLD, COPD on home oxygen, CKD stage III, cirrhosis and GERD who presents to LTAC for long-term care of his CHF exacerbation and acute on chronic renal failure. The patient had originally been admitted to [facility] for CHF exacerbation on 04/27, where he was also found to have afib with RVR and heme  positive stools. His Eliquis was held and GI saw him and did not recommend any inpatient GI workup. He was started back on his Eliquis and IV diuresis with bumex. This was stopped after his creatinine was found to be elevated above baseline. He was started back on diuretics after his volume status was found to still be decompensated. The patient's home metoprolol dose was also increased while at [facility], and his home losartan was held.     The patient was found to be stable at [facility] and was accepted to LTAC for further monitoring/care by Dr. Solis for his acute on chronic CHF exacerbation.      5/11/22:  Cardiology has been consulted for acute CHF with hx of Biventricular failure. Echo yesterday showed EF 20%, moderate RV enlargement with severely reduced RV systolic. NTpBNP is 27K. Pt is currently on Dopamine gtt. Lactate is normal. Initial troponin is normal, will trend. Pt is anasarcic. Tele monitor shows afib RVR with -130s. Amiodarone gtt has been ordered. Pt has had 600ml of urine output since this AM. Creatinine is 3.7. Potassium is 5.4.         * Acute on chronic biventricular congestive heart failure  - pt is severely decompensated  - he is allergic to lasix  - has been receiving IVP bumex 1mg bid  - unable to get bumex infusion due to national Bumex shortage  - increase IVP bumex to 2mg bid  - continue dopamine gtt  - daily weights, strict I&Os, low sodium diet    5/12/2022:  - 24 hour UOP net negative 4.5L  - Continue severe anasarca, albumin 3.2  - Continue dopamine at 2.5 and IV bumex 2mg BID  - BMP in am    5/13/22:  - net negative 4.6L of UOP  - continue dopamine gtt, dose is now at 5  - continue IV bumex 2mg bid  - continue strict I&Os, daily weights  - continue to monitor creatinine and electrolytes      History of aortic valve repair  - functioning properly    Per echo 5/10/22:  · There is a bioprosthetic aortic valve present. There is no aortic insufficiency present. Prosthetic aortic  valve is normal.  · The aortic valve mean gradient is 9 mmHg with a dimensionless index of 0.14        Renal failure (ARF), acute on chronic  - creatinine is 3.7, was 2.8  - nephrology following    5/12/2022:  - Creatinine 3.65, continue monitoring    5/13/22:  - creatinine slightly improved today at 3.3, continue to monitor    A-fib  - afib with RVR - -130s  - continue amiodarone gtt  - give one time dose of IV digoxin 500mcg now  - continue Eliquis    5/12/2022:  - HR now controlled, IV amiodarone has been transitioned to PO  - Continue Eliquis    5/13/22:  - rate is controlled  - continue PO amiodarone  - continue Eliquis        VTE Risk Mitigation (From admission, onward)         Ordered     apixaban tablet 5 mg  2 times daily         05/12/22 0925     IP VTE HIGH RISK PATIENT  Once         05/09/22 1736     Place sequential compression device  Until discontinued         05/09/22 1736                RENEE Verde  Cardiology  [facility]

## 2022-05-13 NOTE — SUBJECTIVE & OBJECTIVE
Interval History: Pt sitting up in chair at bedside awake, alert. Dopamine infusion continues, net negative 4.6L UOP over last 24 hours. He remains anasarcic. Albumin is 3.3.    Review of Systems   Constitutional: Negative for diaphoresis.   Cardiovascular:  Positive for chest pain, dyspnea on exertion, leg swelling and orthopnea. Negative for syncope.   Respiratory:  Positive for shortness of breath.    Gastrointestinal:  Negative for nausea and vomiting.   Objective:     Vital Signs (Most Recent):  Temp: 97.9 °F (36.6 °C) (05/13/22 1000)  Pulse: 84 (05/13/22 1212)  Resp: (!) 26 (05/13/22 1212)  BP: (!) 90/59 (05/13/22 0930)  SpO2: 95 % (05/13/22 1212)   Vital Signs (24h Range):  Temp:  [97.2 °F (36.2 °C)-98.6 °F (37 °C)] 97.9 °F (36.6 °C)  Pulse:  [] 84  Resp:  [6-41] 26  SpO2:  [77 %-100 %] 95 %  BP: ()/() 90/59     Weight: 122.2 kg (269 lb 6.4 oz)  Body mass index is 33.67 kg/m².     SpO2: 95 %  O2 Device (Oxygen Therapy): nasal cannula      Intake/Output Summary (Last 24 hours) at 5/13/2022 1216  Last data filed at 5/13/2022 1000  Gross per 24 hour   Intake 499 ml   Output 5600 ml   Net -5101 ml       Lines/Drains/Airways       Peripherally Inserted Central Catheter Line  Duration             PICC Double Lumen 05/12/22 1520 left basilic <1 day              Drain  Duration                  Urethral Catheter 05/06/22 7 days              Peripheral Intravenous Line  Duration                  Peripheral IV - Single Lumen 22 G Posterior;Right Wrist -- days                    Physical Exam  Constitutional:       General: He is not in acute distress.     Appearance: He is ill-appearing.   Cardiovascular:      Rate and Rhythm: Normal rate. Rhythm irregular.      Pulses: Normal pulses.      Heart sounds: Normal heart sounds.   Pulmonary:      Effort: Pulmonary effort is normal.      Breath sounds: Rales present.   Musculoskeletal:      Comments: Anasarca   Neurological:      Mental Status: He is alert.  Mental status is at baseline.       Significant Labs: All pertinent lab results from the last 24 hours have been reviewed. and   Recent Lab Results         05/13/22  0710   05/12/22  1227        Albumin 3.3         Anion Gap 12         BUN 95         BUN/CREAT RATIO 28         Calcium 9.3         Chloride 102         CO2 35         Creatinine 3.35         eGFR if non African American 20         Glucose 103         Phosphorus 5.2         POC Glucose   128       Potassium 4.6         Sodium 144                 Significant Imaging: Echocardiogram: Transthoracic echo (TTE) complete (Cupid Only):   Results for orders placed or performed during the hospital encounter of 05/09/22   Echo   Result Value Ref Range    BSA 2.57 m2    Right Atrial Pressure (from IVC) 15 mmHg    EF 20 %    Left Ventricular Outflow Tract Mean Gradient 0.30 mmHg    AORTIC VALVE CUSP SEPERATION 15.876181633195469 cm    LVIDd 5.90 3.5 - 6.0 cm    IVS 1.22 (A) 0.6 - 1.1 cm    Posterior Wall 1.27 (A) 0.6 - 1.1 cm    Ao root annulus 3.40 cm    LVIDs 5.40 (A) 2.1 - 4.0 cm    FS 8 28 - 44 %    IVC ostium 3.0 cm    LV mass 321.10 g    LA size 4.80 cm    RVDD 5.10 cm    TAPSE 1.90 cm    Left Ventricle Relative Wall Thickness 0.43 cm    AV mean gradient 9 mmHg    AV valve area 0.47 cm2    AV Velocity Ratio 0.20     AV index (prosthetic) 0.14     MV mean gradient 17 mmHg    MV valve area p 1/2 method 4.40 cm2    MV valve area by continuity eq 0.30 cm2    E wave deceleration time 179 msec    LVOT diameter 2.10 cm    LVOT area 3.5 cm2    LVOT peak walker 0.4 m/s    LVOT peak VTI 5.00 cm    Ao peak walker 2.0 m/s    Ao VTI 37.00 cm    LVOT stroke volume 17.31 cm3    AV peak gradient 16 mmHg    MV peak gradient 28 mmHg    TV rest pulmonary artery pressure 40 mmHg    MV Peak E Walker 1.80 m/s    TR Max Walker 2.50 m/s    MV VTI 57.0 cm    MV stenosis pressure 1/2 time 50 ms    LV Systolic Volume 141.30 mL    LV Systolic Volume Index 56.3 mL/m2    LV Diastolic Volume 173.20 mL     LV Diastolic Volume Index 69.00 mL/m2    LV Mass Index 128 g/m2    Echo EF Estimated 25 %    RA Major Axis 5.00 cm    Triscuspid Valve Regurgitation Peak Gradient 25 mmHg    Narrative    · The left ventricle is moderately enlarged with mild concentric   hypertrophy and severely decreased systolic function.  · The estimated ejection fraction is 20%.  · There is left ventricular global hypokinesis.  · Moderate right ventricular enlargement with severely reduced right   ventricular systolic function.  · Moderate right atrial enlargement.  · Moderate left atrial enlargement.  · Moderate mitral regurgitation.  · Severe tricuspid regurgitation.  · Moderate to severe pulmonic regurgitation.  · There is a bioprosthetic aortic valve present. There is no aortic   insufficiency present. Prosthetic aortic valve is normal.  · The aortic valve mean gradient is 9 mmHg with a dimensionless index of   0.14.  · Elevated central venous pressure (15 mmHg).  · The estimated PA systolic pressure is 40 mmHg.  · There are bilateral pleural effusions.

## 2022-05-13 NOTE — ASSESSMENT & PLAN NOTE
- afib with RVR - -130s  - continue amiodarone gtt  - give one time dose of IV digoxin 500mcg now  - continue Eliquis    5/12/2022:  - HR now controlled, IV amiodarone has been transitioned to PO  - Continue Eliquis    5/13/22:  - rate is controlled  - continue PO amiodarone  - continue Eliquis

## 2022-05-14 LAB
ALBUMIN SERPL BCP-MCNC: 3 G/DL (ref 3.5–5)
ALBUMIN/GLOB SERPL: 1 {RATIO}
ALP SERPL-CCNC: 92 U/L (ref 45–115)
ALT SERPL W P-5'-P-CCNC: 12 U/L (ref 16–61)
ANION GAP SERPL CALCULATED.3IONS-SCNC: 9 MMOL/L (ref 7–16)
AST SERPL W P-5'-P-CCNC: 8 U/L (ref 15–37)
BILIRUB SERPL-MCNC: 0.7 MG/DL (ref 0–1.2)
BUN SERPL-MCNC: 86 MG/DL (ref 7–18)
BUN/CREAT SERPL: 27 (ref 6–20)
CALCIUM SERPL-MCNC: 9 MG/DL (ref 8.5–10.1)
CHLORIDE SERPL-SCNC: 99 MMOL/L (ref 98–107)
CO2 SERPL-SCNC: 41 MMOL/L (ref 21–32)
CREAT SERPL-MCNC: 3.22 MG/DL (ref 0.7–1.3)
GLOBULIN SER-MCNC: 3 G/DL (ref 2–4)
GLUCOSE SERPL-MCNC: 101 MG/DL (ref 74–106)
POTASSIUM SERPL-SCNC: 4.7 MMOL/L (ref 3.5–5.1)
PROT SERPL-MCNC: 6 G/DL (ref 6.4–8.2)
SODIUM SERPL-SCNC: 144 MMOL/L (ref 136–145)

## 2022-05-14 PROCEDURE — 25000003 PHARM REV CODE 250: Performed by: NURSE PRACTITIONER

## 2022-05-14 PROCEDURE — 25000003 PHARM REV CODE 250: Performed by: INTERNAL MEDICINE

## 2022-05-14 PROCEDURE — 99232 SBSQ HOSP IP/OBS MODERATE 35: CPT | Mod: ,,, | Performed by: INTERNAL MEDICINE

## 2022-05-14 PROCEDURE — 36415 COLL VENOUS BLD VENIPUNCTURE: CPT | Performed by: INTERNAL MEDICINE

## 2022-05-14 PROCEDURE — 25000242 PHARM REV CODE 250 ALT 637 W/ HCPCS

## 2022-05-14 PROCEDURE — 94640 AIRWAY INHALATION TREATMENT: CPT

## 2022-05-14 PROCEDURE — 25000003 PHARM REV CODE 250: Performed by: STUDENT IN AN ORGANIZED HEALTH CARE EDUCATION/TRAINING PROGRAM

## 2022-05-14 PROCEDURE — 63600150 PHARM REV CODE 636: Performed by: STUDENT IN AN ORGANIZED HEALTH CARE EDUCATION/TRAINING PROGRAM

## 2022-05-14 PROCEDURE — 11000008

## 2022-05-14 PROCEDURE — 25000003 PHARM REV CODE 250

## 2022-05-14 PROCEDURE — 80053 COMPREHEN METABOLIC PANEL: CPT | Performed by: INTERNAL MEDICINE

## 2022-05-14 PROCEDURE — 27000221 HC OXYGEN, UP TO 24 HOURS

## 2022-05-14 PROCEDURE — 99232 PR SUBSEQUENT HOSPITAL CARE,LEVL II: ICD-10-PCS | Mod: ,,, | Performed by: INTERNAL MEDICINE

## 2022-05-14 RX ORDER — QUETIAPINE FUMARATE 25 MG/1
50 TABLET, FILM COATED ORAL 2 TIMES DAILY
Status: DISCONTINUED | OUTPATIENT
Start: 2022-05-14 | End: 2022-05-18 | Stop reason: HOSPADM

## 2022-05-14 RX ORDER — BISACODYL 10 MG
10 SUPPOSITORY, RECTAL RECTAL DAILY PRN
Status: DISCONTINUED | OUTPATIENT
Start: 2022-05-14 | End: 2022-05-18 | Stop reason: HOSPADM

## 2022-05-14 RX ORDER — METOLAZONE 5 MG/1
5 TABLET ORAL DAILY
Status: DISCONTINUED | OUTPATIENT
Start: 2022-05-15 | End: 2022-05-14

## 2022-05-14 RX ORDER — POLYVINYL ALCOHOL 14 MG/ML
1 SOLUTION/ DROPS OPHTHALMIC
Status: DISCONTINUED | OUTPATIENT
Start: 2022-05-14 | End: 2022-05-18 | Stop reason: HOSPADM

## 2022-05-14 RX ORDER — BUMETANIDE 0.25 MG/ML
3 INJECTION INTRAMUSCULAR; INTRAVENOUS 2 TIMES DAILY
Status: DISCONTINUED | OUTPATIENT
Start: 2022-05-14 | End: 2022-05-16

## 2022-05-14 RX ORDER — MIDODRINE HYDROCHLORIDE 5 MG/1
5 TABLET ORAL
Status: DISCONTINUED | OUTPATIENT
Start: 2022-05-14 | End: 2022-05-18 | Stop reason: HOSPADM

## 2022-05-14 RX ADMIN — LACTULOSE 20 G: 20 SOLUTION ORAL at 10:05

## 2022-05-14 RX ADMIN — METOPROLOL TARTRATE 25 MG: 25 TABLET, FILM COATED ORAL at 09:05

## 2022-05-14 RX ADMIN — DOPAMINE HYDROCHLORIDE IN DEXTROSE 5 MCG/KG/MIN: 3.2 INJECTION, SOLUTION INTRAVENOUS at 05:05

## 2022-05-14 RX ADMIN — IPRATROPIUM BROMIDE AND ALBUTEROL SULFATE 3 ML: 2.5; .5 SOLUTION RESPIRATORY (INHALATION) at 01:05

## 2022-05-14 RX ADMIN — BUMETANIDE 2 MG: 0.25 INJECTION, SOLUTION INTRAMUSCULAR; INTRAVENOUS at 09:05

## 2022-05-14 RX ADMIN — IPRATROPIUM BROMIDE AND ALBUTEROL SULFATE 3 ML: 2.5; .5 SOLUTION RESPIRATORY (INHALATION) at 08:05

## 2022-05-14 RX ADMIN — BUPROPION HYDROCHLORIDE 75 MG: 75 TABLET, FILM COATED ORAL at 09:05

## 2022-05-14 RX ADMIN — METOLAZONE 5 MG: 5 TABLET ORAL at 09:05

## 2022-05-14 RX ADMIN — BUMETANIDE 3 MG: 0.25 INJECTION, SOLUTION INTRAMUSCULAR; INTRAVENOUS at 10:05

## 2022-05-14 RX ADMIN — RIFAXIMIN 550 MG: 550 TABLET ORAL at 10:05

## 2022-05-14 RX ADMIN — MICONAZOLE NITRATE 2 % TOPICAL POWDER: at 10:05

## 2022-05-14 RX ADMIN — LACTULOSE 20 G: 20 SOLUTION ORAL at 02:05

## 2022-05-14 RX ADMIN — FAMOTIDINE 20 MG: 20 TABLET ORAL at 09:05

## 2022-05-14 RX ADMIN — MUPIROCIN: 20 OINTMENT TOPICAL at 10:05

## 2022-05-14 RX ADMIN — MIDODRINE HYDROCHLORIDE 5 MG: 5 TABLET ORAL at 09:05

## 2022-05-14 RX ADMIN — ACETAMINOPHEN 650 MG: 325 TABLET, FILM COATED ORAL at 04:05

## 2022-05-14 RX ADMIN — RIFAXIMIN 550 MG: 550 TABLET ORAL at 09:05

## 2022-05-14 RX ADMIN — MIDODRINE HYDROCHLORIDE 5 MG: 5 TABLET ORAL at 04:05

## 2022-05-14 RX ADMIN — LACTULOSE 20 G: 20 SOLUTION ORAL at 09:05

## 2022-05-14 RX ADMIN — BUPROPION HYDROCHLORIDE 75 MG: 75 TABLET, FILM COATED ORAL at 10:05

## 2022-05-14 RX ADMIN — POLYETHYLENE GLYCOL 3350 17 G: 17 POWDER, FOR SOLUTION ORAL at 09:05

## 2022-05-14 RX ADMIN — APIXABAN 5 MG: 5 TABLET, FILM COATED ORAL at 09:05

## 2022-05-14 RX ADMIN — AMIODARONE HYDROCHLORIDE 200 MG: 200 TABLET ORAL at 09:05

## 2022-05-14 RX ADMIN — AMIODARONE HYDROCHLORIDE 200 MG: 200 TABLET ORAL at 10:05

## 2022-05-14 RX ADMIN — QUETIAPINE 50 MG: 25 TABLET ORAL at 10:05

## 2022-05-14 RX ADMIN — QUETIAPINE FUMARATE 100 MG: 100 TABLET ORAL at 09:05

## 2022-05-14 RX ADMIN — LACTULOSE 20 G: 20 SOLUTION ORAL at 04:05

## 2022-05-14 RX ADMIN — METOPROLOL TARTRATE 25 MG: 25 TABLET, FILM COATED ORAL at 10:05

## 2022-05-14 RX ADMIN — APIXABAN 5 MG: 5 TABLET, FILM COATED ORAL at 10:05

## 2022-05-14 RX ADMIN — IPRATROPIUM BROMIDE AND ALBUTEROL SULFATE 3 ML: 2.5; .5 SOLUTION RESPIRATORY (INHALATION) at 12:05

## 2022-05-14 NOTE — PROGRESS NOTES
[facility] Medicine  Progress Note    Patient Name: Modesto Patrick  MRN: [id number]  Patient Class: IP- Inpatient   Admission Date: 5/9/2022  Length of Stay: 5 days  Attending Physician: Neeta Solis MD  Primary Care Provider: Primary Doctor No        Subjective:     Principal Problem:Biventricular congestive heart failure        HPI:  The patient is a 60yo AA male with a MedHX of CHF, paroxysmal afib, chronic anticoagulation of eliquis, HTN, HLD, AAA repair, HLD, COPD on home oxygen, CKD stage III, cirrhosis and GERD who presents to LTAC for long-term care of his CHF exacerbation and acute on chronic renal failure. The patient had originally been admitted to [facility] for CHF exacerbation on 04/27, where he was also found to have afib with RVR and heme positive stools. His Eliquis was held and GI saw him and did not recommend any inpatient GI workup. He was started back on his Eliquis and IV diuresis with bumex. This was stopped after his creatinine was found to be elevated above baseline. He was started back on diuretics after his volume status was found to still be decompensated. The patient's home metoprolol dose was also increased while at [facility], and his home losartan, was held.    The patient was found to be stable at [facility] and was accepted to LTAC for further monitoring/care by Dr. Solis for his acute on chronic CHF exacerbation.      Overview/Hospital Course:  05/11 Awake and resting in bed. Having sob. Edematous.  Sat is 96%. Vitals signs stable. Recheck potassium level today.  05/12 Mr. Patrick had afib with RVR and HF on 05/11. Required IV amiodarone and dopamine. Heart rate this am is in 70s - will dc amio drip and transition to amiodarone po. States he is feeling about the same.      Interval History:     NAEO  Pt says breathing some what better  Cont iv diuresis  Plan is for home hospice, will see if home inotrope for  palliative/hospice intent will be possilbe. Pt and famiy understand of guarded prognosis.       Review of Systems   Constitutional:  Positive for fatigue.   Respiratory:  Positive for shortness of breath.    Cardiovascular:  Positive for leg swelling.   Psychiatric/Behavioral:  Positive for confusion.    Objective:     Vital Signs (Most Recent):  Temp: 97.7 °F (36.5 °C) (05/14/22 0800)  Pulse: 92 (05/14/22 1212)  Resp: 19 (05/14/22 1212)  BP: 100/61 (05/14/22 1145)  SpO2: 100 % (05/14/22 1212)   Vital Signs (24h Range):  Temp:  [97.6 °F (36.4 °C)-98.6 °F (37 °C)] 97.7 °F (36.5 °C)  Pulse:  [] 92  Resp:  [8-32] 19  SpO2:  [65 %-100 %] 100 %  BP: ()/() 100/61     Weight: 115.3 kg (254 lb 3.1 oz)  Body mass index is 31.77 kg/m².    Intake/Output Summary (Last 24 hours) at 5/14/2022 1218  Last data filed at 5/14/2022 0745  Gross per 24 hour   Intake 259.2 ml   Output 5075 ml   Net -4815.8 ml        Physical Exam  Constitutional:       Appearance: He is ill-appearing.   Eyes:      Pupils: Pupils are equal, round, and reactive to light.   Cardiovascular:      Rate and Rhythm: Tachycardia present. Rhythm irregular.   Pulmonary:      Breath sounds: No wheezing or rales.   Abdominal:      Tenderness: There is no abdominal tenderness.   Genitourinary:     Comments: Scrotal swelling  Musculoskeletal:      Cervical back: Neck supple.      Right lower leg: Edema present.      Left lower leg: Edema present.   Neurological:      Mental Status: He is alert. Mental status is at baseline.      Comments: Confusion/dementia/HE.        Significant Labs: All pertinent labs within the past 24 hours have been reviewed.    Significant Imaging: I have reviewed all pertinent imaging results/findings within the past 24 hours.      Assessment/Plan:      * Acute on chronic biventricular congestive heart failure  - Continuous oxygen  - Continue bumetinide 1mg IV bid  - duonebs  - Cardiology consult  - ECHO pending  -  telemetry  - continue metoprolol 50mg bid   - daily weights  - daily I+O  - low sodium diet  - dietary consult      05/11 sob this morning  02 per nc   Cardiology consulted       05/12 continue diuresis   Continue dopamine drip    GERD (gastroesophageal reflux disease)  - famotidine injection 20mg bID      Multiple wounds of skin  - has bilateral chronic lower extremity skin changes/wounds  - wound care consult       Depression  - continue buproprion      Cirrhosis  - continue rifaximin 550mg bid  - continue lactulose 20mg qid      HTN (hypertension)  - continue metoprolol 50mg bid  -continue lopressor          A-fib  - continue eliquis 2.5mg bid  - continue amiodarone 200mg bid  - telemetry      05/11  - on amiodarone and lopressor   -continue eliquis    05/12 pulse in 70s   Dc amiodarone drip and transition to po amiodarone    CKD (chronic kidney disease)  - bmp pending  - records say creatinine at baseline at 3.3 today at [facility]      COPD (chronic obstructive pulmonary disease)  - continual oxygen via NC  - duonebs q6        VTE Risk Mitigation (From admission, onward)         Ordered     apixaban tablet 5 mg  2 times daily         05/12/22 0925     IP VTE HIGH RISK PATIENT  Once         05/09/22 1736     Place sequential compression device  Until discontinued         05/09/22 1736                Discharge Planning   DIONY:      Code Status: DNR   Is the patient medically ready for discharge?:     Reason for patient still in hospital (select all that apply): Treatment  Discharge Plan A: Home with family, Home Health                  Rehmat LATOYA Solis MD  Department of Hospital Medicine   [facility]

## 2022-05-14 NOTE — PLAN OF CARE
Problem: Gas Exchange Impaired  Goal: Optimal Gas Exchange  Outcome: Ongoing, Progressing     Problem: Skin Injury Risk Increased  Goal: Skin Health and Integrity  Outcome: Ongoing, Progressing     Problem: Fluid and Electrolyte Imbalance (Acute Kidney Injury/Impairment)  Goal: Fluid and Electrolyte Balance  Outcome: Ongoing, Progressing     Problem: Oral Intake Inadequate (Acute Kidney Injury/Impairment)  Goal: Optimal Nutrition Intake  Outcome: Ongoing, Progressing     Problem: Renal Function Impairment (Acute Kidney Injury/Impairment)  Goal: Effective Renal Function  Outcome: Ongoing, Progressing     Problem: Impaired Wound Healing  Goal: Optimal Wound Healing  Outcome: Ongoing, Progressing

## 2022-05-14 NOTE — NURSING
2000-Lying in bed with HOB^. Resp even et unlabored. 02 @ 4l nbp. 02 sats 97% @ present. L arm PICC line infusing Dopamine @ 11.6 ml/hr=5 mcg/kg/min. Keen patent/draining w/o diff. Generalized edema present. Alert/orient. Able to make needs known. Placed on et off bed pan-no results. Repositioned for comfort. Safety measures on-going.

## 2022-05-14 NOTE — SUBJECTIVE & OBJECTIVE
Interval History:     MAO  Pt says breathing some what better  Cont iv diuresis  Plan is for home hospice, will see if home inotrope for palliative/hospice intent will be possilbe. Pt and famiy understand of guarded prognosis.       Review of Systems   Constitutional:  Positive for fatigue.   Respiratory:  Positive for shortness of breath.    Cardiovascular:  Positive for leg swelling.   Psychiatric/Behavioral:  Positive for confusion.    Objective:     Vital Signs (Most Recent):  Temp: 97.7 °F (36.5 °C) (05/14/22 0800)  Pulse: 92 (05/14/22 1212)  Resp: 19 (05/14/22 1212)  BP: 100/61 (05/14/22 1145)  SpO2: 100 % (05/14/22 1212)   Vital Signs (24h Range):  Temp:  [97.6 °F (36.4 °C)-98.6 °F (37 °C)] 97.7 °F (36.5 °C)  Pulse:  [] 92  Resp:  [8-32] 19  SpO2:  [65 %-100 %] 100 %  BP: ()/() 100/61     Weight: 115.3 kg (254 lb 3.1 oz)  Body mass index is 31.77 kg/m².    Intake/Output Summary (Last 24 hours) at 5/14/2022 1218  Last data filed at 5/14/2022 0745  Gross per 24 hour   Intake 259.2 ml   Output 5075 ml   Net -4815.8 ml        Physical Exam  Constitutional:       Appearance: He is ill-appearing.   Eyes:      Pupils: Pupils are equal, round, and reactive to light.   Cardiovascular:      Rate and Rhythm: Tachycardia present. Rhythm irregular.   Pulmonary:      Breath sounds: No wheezing or rales.   Abdominal:      Tenderness: There is no abdominal tenderness.   Genitourinary:     Comments: Scrotal swelling  Musculoskeletal:      Cervical back: Neck supple.      Right lower leg: Edema present.      Left lower leg: Edema present.   Neurological:      Mental Status: He is alert. Mental status is at baseline.      Comments: Confusion/dementia/HE.        Significant Labs: All pertinent labs within the past 24 hours have been reviewed.    Significant Imaging: I have reviewed all pertinent imaging results/findings within the past 24 hours.

## 2022-05-15 LAB
ALBUMIN SERPL BCP-MCNC: 3.1 G/DL (ref 3.5–5)
ALBUMIN/GLOB SERPL: 1.2 {RATIO}
ALP SERPL-CCNC: 89 U/L (ref 45–115)
ALT SERPL W P-5'-P-CCNC: 15 U/L (ref 16–61)
ANION GAP SERPL CALCULATED.3IONS-SCNC: 5 MMOL/L (ref 7–16)
AST SERPL W P-5'-P-CCNC: 9 U/L (ref 15–37)
BILIRUB SERPL-MCNC: 0.6 MG/DL (ref 0–1.2)
BUN SERPL-MCNC: 80 MG/DL (ref 7–18)
BUN/CREAT SERPL: 28 (ref 6–20)
CALCIUM SERPL-MCNC: 9 MG/DL (ref 8.5–10.1)
CHLORIDE SERPL-SCNC: 101 MMOL/L (ref 98–107)
CO2 SERPL-SCNC: 41 MMOL/L (ref 21–32)
CREAT SERPL-MCNC: 2.86 MG/DL (ref 0.7–1.3)
GLOBULIN SER-MCNC: 2.6 G/DL (ref 2–4)
GLUCOSE SERPL-MCNC: 103 MG/DL (ref 74–106)
POTASSIUM SERPL-SCNC: 4.1 MMOL/L (ref 3.5–5.1)
PROT SERPL-MCNC: 5.7 G/DL (ref 6.4–8.2)
SODIUM SERPL-SCNC: 143 MMOL/L (ref 136–145)

## 2022-05-15 PROCEDURE — 80053 COMPREHEN METABOLIC PANEL: CPT | Performed by: INTERNAL MEDICINE

## 2022-05-15 PROCEDURE — 25000003 PHARM REV CODE 250: Performed by: INTERNAL MEDICINE

## 2022-05-15 PROCEDURE — 99233 SBSQ HOSP IP/OBS HIGH 50: CPT | Mod: ,,, | Performed by: INTERNAL MEDICINE

## 2022-05-15 PROCEDURE — P9047 ALBUMIN (HUMAN), 25%, 50ML: HCPCS | Performed by: INTERNAL MEDICINE

## 2022-05-15 PROCEDURE — 25000003 PHARM REV CODE 250

## 2022-05-15 PROCEDURE — 25000242 PHARM REV CODE 250 ALT 637 W/ HCPCS

## 2022-05-15 PROCEDURE — 25000003 PHARM REV CODE 250: Performed by: STUDENT IN AN ORGANIZED HEALTH CARE EDUCATION/TRAINING PROGRAM

## 2022-05-15 PROCEDURE — 94761 N-INVAS EAR/PLS OXIMETRY MLT: CPT

## 2022-05-15 PROCEDURE — 63600150 PHARM REV CODE 636: Performed by: STUDENT IN AN ORGANIZED HEALTH CARE EDUCATION/TRAINING PROGRAM

## 2022-05-15 PROCEDURE — 94640 AIRWAY INHALATION TREATMENT: CPT

## 2022-05-15 PROCEDURE — 27000221 HC OXYGEN, UP TO 24 HOURS

## 2022-05-15 PROCEDURE — 63600175 PHARM REV CODE 636 W HCPCS: Performed by: INTERNAL MEDICINE

## 2022-05-15 PROCEDURE — 99233 PR SUBSEQUENT HOSPITAL CARE,LEVL III: ICD-10-PCS | Mod: ,,, | Performed by: INTERNAL MEDICINE

## 2022-05-15 PROCEDURE — 25000003 PHARM REV CODE 250: Performed by: NURSE PRACTITIONER

## 2022-05-15 PROCEDURE — 11000008

## 2022-05-15 PROCEDURE — 27200155 *HC SET PRIMARY NONVENTED

## 2022-05-15 PROCEDURE — 36415 COLL VENOUS BLD VENIPUNCTURE: CPT | Performed by: INTERNAL MEDICINE

## 2022-05-15 RX ORDER — ALBUMIN HUMAN 250 G/1000ML
25 SOLUTION INTRAVENOUS ONCE
Status: COMPLETED | OUTPATIENT
Start: 2022-05-15 | End: 2022-05-15

## 2022-05-15 RX ADMIN — BUMETANIDE 3 MG: 0.25 INJECTION, SOLUTION INTRAMUSCULAR; INTRAVENOUS at 09:05

## 2022-05-15 RX ADMIN — ALBUMIN (HUMAN) 25 G: 12.5 SOLUTION INTRAVENOUS at 04:05

## 2022-05-15 RX ADMIN — METOPROLOL TARTRATE 25 MG: 25 TABLET, FILM COATED ORAL at 09:05

## 2022-05-15 RX ADMIN — AMIODARONE HYDROCHLORIDE 1 MG/MIN: 1.8 INJECTION, SOLUTION INTRAVENOUS at 06:05

## 2022-05-15 RX ADMIN — APIXABAN 5 MG: 5 TABLET, FILM COATED ORAL at 08:05

## 2022-05-15 RX ADMIN — AMIODARONE HYDROCHLORIDE 200 MG: 200 TABLET ORAL at 09:05

## 2022-05-15 RX ADMIN — MICONAZOLE NITRATE 2 % TOPICAL POWDER: at 09:05

## 2022-05-15 RX ADMIN — MICONAZOLE NITRATE 2 % TOPICAL POWDER: at 08:05

## 2022-05-15 RX ADMIN — QUETIAPINE 50 MG: 25 TABLET ORAL at 09:05

## 2022-05-15 RX ADMIN — IPRATROPIUM BROMIDE AND ALBUTEROL SULFATE 3 ML: 2.5; .5 SOLUTION RESPIRATORY (INHALATION) at 08:05

## 2022-05-15 RX ADMIN — RIFAXIMIN 550 MG: 550 TABLET ORAL at 08:05

## 2022-05-15 RX ADMIN — APIXABAN 5 MG: 5 TABLET, FILM COATED ORAL at 09:05

## 2022-05-15 RX ADMIN — IPRATROPIUM BROMIDE AND ALBUTEROL SULFATE 3 ML: 2.5; .5 SOLUTION RESPIRATORY (INHALATION) at 07:05

## 2022-05-15 RX ADMIN — IPRATROPIUM BROMIDE AND ALBUTEROL SULFATE 3 ML: 2.5; .5 SOLUTION RESPIRATORY (INHALATION) at 01:05

## 2022-05-15 RX ADMIN — MIDODRINE HYDROCHLORIDE 5 MG: 5 TABLET ORAL at 04:05

## 2022-05-15 RX ADMIN — MIDODRINE HYDROCHLORIDE 5 MG: 5 TABLET ORAL at 11:05

## 2022-05-15 RX ADMIN — FAMOTIDINE 20 MG: 20 TABLET ORAL at 09:05

## 2022-05-15 RX ADMIN — LACTULOSE 20 G: 20 SOLUTION ORAL at 04:05

## 2022-05-15 RX ADMIN — LACTULOSE 20 G: 20 SOLUTION ORAL at 08:05

## 2022-05-15 RX ADMIN — POLYETHYLENE GLYCOL 3350 17 G: 17 POWDER, FOR SOLUTION ORAL at 08:05

## 2022-05-15 RX ADMIN — AMIODARONE HYDROCHLORIDE 150 MG: 50 INJECTION, SOLUTION INTRAVENOUS at 05:05

## 2022-05-15 RX ADMIN — LACTULOSE 20 G: 20 SOLUTION ORAL at 09:05

## 2022-05-15 RX ADMIN — LACTULOSE 20 G: 20 SOLUTION ORAL at 01:05

## 2022-05-15 RX ADMIN — MIDODRINE HYDROCHLORIDE 5 MG: 5 TABLET ORAL at 09:05

## 2022-05-15 RX ADMIN — DOPAMINE HYDROCHLORIDE IN DEXTROSE 5 MCG/KG/MIN: 3.2 INJECTION, SOLUTION INTRAVENOUS at 03:05

## 2022-05-15 RX ADMIN — RIFAXIMIN 550 MG: 550 TABLET ORAL at 09:05

## 2022-05-15 RX ADMIN — BUPROPION HYDROCHLORIDE 75 MG: 75 TABLET, FILM COATED ORAL at 08:05

## 2022-05-15 NOTE — ASSESSMENT & PLAN NOTE
Urine output greater than 6 L/24 hours.  Creatinine slightly improved.  He is developing metabolic alkalosis.  Hold metolazone.  Continue Bumex and dopamine.

## 2022-05-15 NOTE — PROGRESS NOTES
[facility]  Nephrology  Progress Note    Patient Name: Modesto Patrick  MRN: [id number]  Admission Date: 5/9/2022  Hospital Length of Stay: 6 days  Attending Provider: Neeta Solis MD   Primary Care Physician: Primary Doctor No  Principal Problem:Biventricular congestive heart failure    Subjective:     HPI: I have been following Mr. Patrick during his hospitalization at [facility].  He has biventricular heart failure, AFib, cirrhosis, and acute on chronic renal failure.  He has significant lower extremity and scrotal edema.  Albumin is low.  He has been receiving IV albumin and IV Bumex for the past 3 days.  Creatinine has risen from 2.8 to 3.7 during that time.      Interval History:  He is alert.  He has multiple visitors today.  He denies SOB.  He states he feels better today    Review of patient's allergies indicates:   Allergen Reactions    Lasix [furosemide]      Current Facility-Administered Medications   Medication Frequency    acetaminophen tablet 650 mg Q4H PRN    albuterol-ipratropium 2.5 mg-0.5 mg/3 mL nebulizer solution 3 mL Q6H    amiodarone tablet 200 mg BID    apixaban tablet 5 mg BID    bisacodyL suppository 10 mg Daily PRN    bumetanide injection 3 mg BID    buPROPion tablet 75 mg BID    calamine-zinc oxide 8-8% solution QID PRN    DOPamine 800 mg in dextrose 5 % 250 mL infusion (premix) (NON-TITRATING) Continuous    famotidine tablet 20 mg Daily    hydrALAZINE injection 10 mg Q6H PRN    lactulose 20 gram/30 mL solution Soln 20 g QID    melatonin tablet 6 mg Nightly PRN    metoprolol tartrate (LOPRESSOR) tablet 25 mg BID    miconazole NITRATE 2 % top powder BID    midodrine tablet 5 mg TID WM    ondansetron tablet 8 mg Q8H PRN    polyethylene glycol packet 17 g Daily    polyvinyl alcohol (artificial tears) 1.4 % ophthalmic solution 1 drop PRN    prochlorperazine injection Soln 5 mg Q6H PRN    QUEtiapine tablet 50 mg BID    rifAXIMin tablet 550 mg BID     silver sulfADIAZINE 1% cream PRN       Objective:     Vital Signs (Most Recent):  Temp: 97.2 °F (36.2 °C) (05/15/22 1200)  Pulse: (!) 133 (05/15/22 1415)  Resp: 19 (05/15/22 1415)  BP: 107/62 (05/15/22 1400)  SpO2: (!) 83 % (05/15/22 1415)  O2 Device (Oxygen Therapy): nasal cannula (05/15/22 1309)   Vital Signs (24h Range):  Temp:  [97.2 °F (36.2 °C)-98.8 °F (37.1 °C)] 97.2 °F (36.2 °C)  Pulse:  [] 133  Resp:  [14-35] 19  SpO2:  [72 %-100 %] 83 %  BP: ()/(44-84) 107/62     Weight: 115.3 kg (254 lb 3.1 oz) (05/14/22 0600)  Body mass index is 31.77 kg/m².  Body surface area is 2.47 meters squared.    I/O last 3 completed shifts:  In: 120 [P.O.:120]  Out: 8950 [Urine:8950]    Physical Exam  Eyes:      Pupils: Pupils are equal, round, and reactive to light.   Cardiovascular:      Rate and Rhythm: Tachycardia present. Rhythm irregular.   Pulmonary:      Breath sounds: No wheezing or rales.   Abdominal:      Tenderness: There is no abdominal tenderness.   Genitourinary:     Comments: Scrotal swelling  Musculoskeletal:      Cervical back: Neck supple.      Right lower leg: Edema present.      Left lower leg: Edema present.   Neurological:      Mental Status: He is alert.       Significant Labs:  BMP:   Recent Labs   Lab 05/15/22  0339         K 4.1      CO2 41*   BUN 80*   CREATININE 2.86*   CALCIUM 9.0     CBC:   Recent Labs   Lab 05/10/22  0342   WBC 6.89   RBC 2.97*   HGB 9.0*   HCT 29.0*   *   MCV 97.6*   MCH 30.3   MCHC 31.0*        Significant Imaging:      Assessment/Plan:     * Acute on chronic biventricular congestive heart failure    R and L ventricular failure with cirrhosis.      Renal failure (ARF), acute on chronic  Renal function has improved due to improved perfusion while on dopamine.  He is diuresing at a brisk rate.    Cirrhosis  Secondary to right heart failure and previous ethanol use    HTN (hypertension)  BP borderline low    A-fib  Chronic Afib.  Rate is  controlled with amiodarone and digoxin.    CKD (chronic kidney disease)  CKD 3        Thank you for your consult.     Jaiden Grant MD  Nephrology  [facility]

## 2022-05-15 NOTE — SUBJECTIVE & OBJECTIVE
Interval History:  He denies SOB.  He complains of discomfort from Keen catheter.    Review of patient's allergies indicates:   Allergen Reactions    Lasix [furosemide]      Current Facility-Administered Medications   Medication Frequency    acetaminophen tablet 650 mg Q4H PRN    albuterol-ipratropium 2.5 mg-0.5 mg/3 mL nebulizer solution 3 mL Q6H    amiodarone tablet 200 mg BID    apixaban tablet 5 mg BID    bisacodyL suppository 10 mg Daily PRN    bumetanide injection 3 mg BID    buPROPion tablet 75 mg BID    calamine-zinc oxide 8-8% solution QID PRN    DOPamine 800 mg in dextrose 5 % 250 mL infusion (premix) (NON-TITRATING) Continuous    famotidine tablet 20 mg Daily    hydrALAZINE injection 10 mg Q6H PRN    lactulose 20 gram/30 mL solution Soln 20 g QID    melatonin tablet 6 mg Nightly PRN    [START ON 5/15/2022] metOLazone tablet 5 mg Daily    metoprolol tartrate (LOPRESSOR) tablet 25 mg BID    miconazole NITRATE 2 % top powder BID    midodrine tablet 5 mg TID WM    ondansetron tablet 8 mg Q8H PRN    polyethylene glycol packet 17 g Daily    polyvinyl alcohol (artificial tears) 1.4 % ophthalmic solution 1 drop PRN    prochlorperazine injection Soln 5 mg Q6H PRN    QUEtiapine tablet 50 mg BID    rifAXIMin tablet 550 mg BID    silver sulfADIAZINE 1% cream PRN       Objective:     Vital Signs (Most Recent):  Temp: 98.8 °F (37.1 °C) (05/14/22 2100)  Pulse: 108 (05/14/22 2100)  Resp: (!) 22 (05/14/22 2100)  BP: 125/76 (05/14/22 2100)  SpO2: 96 % (05/14/22 2100)  O2 Device (Oxygen Therapy): nasal cannula (05/14/22 2008)   Vital Signs (24h Range):  Temp:  [97.7 °F (36.5 °C)-98.8 °F (37.1 °C)] 98.8 °F (37.1 °C)  Pulse:  [] 108  Resp:  [8-32] 22  SpO2:  [65 %-100 %] 96 %  BP: ()/() 125/76     Weight: 115.3 kg (254 lb 3.1 oz) (05/14/22 0600)  Body mass index is 31.77 kg/m².  Body surface area is 2.47 meters squared.    I/O last 3 completed shifts:  In: 259.2 [P.O.:120; I.V.:139.2]  Out: 43968  [Urine:25855]    Physical Exam  Eyes:      Pupils: Pupils are equal, round, and reactive to light.   Cardiovascular:      Rate and Rhythm: Tachycardia present. Rhythm irregular.   Pulmonary:      Breath sounds: No wheezing or rales.   Abdominal:      Tenderness: There is no abdominal tenderness.   Genitourinary:     Comments: Scrotal swelling  Musculoskeletal:      Cervical back: Neck supple.      Right lower leg: Edema present.      Left lower leg: Edema present.   Neurological:      Mental Status: He is alert.       Significant Labs:  BMP:   Recent Labs   Lab 05/14/22  0445         K 4.7   CL 99   CO2 41*   BUN 86*   CREATININE 3.22*   CALCIUM 9.0        Significant Imaging:

## 2022-05-15 NOTE — PROGRESS NOTES
[facility]  Nephrology  Progress Note    Patient Name: Modesto Patrick  MRN: [id number]  Admission Date: 5/9/2022  Hospital Length of Stay: 5 days  Attending Provider: Neeta Solis MD   Primary Care Physician: Primary Doctor No  Principal Problem:Biventricular congestive heart failure    Subjective:     HPI: I have been following Mr. Patrick during his hospitalization at [facility].  He has biventricular heart failure, AFib, cirrhosis, and acute on chronic renal failure.  He has significant lower extremity and scrotal edema.  Albumin is low.  He has been receiving IV albumin and IV Bumex for the past 3 days.  Creatinine has risen from 2.8 to 3.7 during that time.      Interval History:  He denies SOB.  He complains of discomfort from Keen catheter.    Review of patient's allergies indicates:   Allergen Reactions    Lasix [furosemide]      Current Facility-Administered Medications   Medication Frequency    acetaminophen tablet 650 mg Q4H PRN    albuterol-ipratropium 2.5 mg-0.5 mg/3 mL nebulizer solution 3 mL Q6H    amiodarone tablet 200 mg BID    apixaban tablet 5 mg BID    bisacodyL suppository 10 mg Daily PRN    bumetanide injection 3 mg BID    buPROPion tablet 75 mg BID    calamine-zinc oxide 8-8% solution QID PRN    DOPamine 800 mg in dextrose 5 % 250 mL infusion (premix) (NON-TITRATING) Continuous    famotidine tablet 20 mg Daily    hydrALAZINE injection 10 mg Q6H PRN    lactulose 20 gram/30 mL solution Soln 20 g QID    melatonin tablet 6 mg Nightly PRN    [START ON 5/15/2022] metOLazone tablet 5 mg Daily    metoprolol tartrate (LOPRESSOR) tablet 25 mg BID    miconazole NITRATE 2 % top powder BID    midodrine tablet 5 mg TID WM    ondansetron tablet 8 mg Q8H PRN    polyethylene glycol packet 17 g Daily    polyvinyl alcohol (artificial tears) 1.4 % ophthalmic solution 1 drop PRN    prochlorperazine injection Soln 5 mg Q6H PRN    QUEtiapine tablet 50 mg BID     rifAXIMin tablet 550 mg BID    silver sulfADIAZINE 1% cream PRN       Objective:     Vital Signs (Most Recent):  Temp: 98.8 °F (37.1 °C) (05/14/22 2100)  Pulse: 108 (05/14/22 2100)  Resp: (!) 22 (05/14/22 2100)  BP: 125/76 (05/14/22 2100)  SpO2: 96 % (05/14/22 2100)  O2 Device (Oxygen Therapy): nasal cannula (05/14/22 2008)   Vital Signs (24h Range):  Temp:  [97.7 °F (36.5 °C)-98.8 °F (37.1 °C)] 98.8 °F (37.1 °C)  Pulse:  [] 108  Resp:  [8-32] 22  SpO2:  [65 %-100 %] 96 %  BP: ()/() 125/76     Weight: 115.3 kg (254 lb 3.1 oz) (05/14/22 0600)  Body mass index is 31.77 kg/m².  Body surface area is 2.47 meters squared.    I/O last 3 completed shifts:  In: 259.2 [P.O.:120; I.V.:139.2]  Out: 89542 [Urine:00036]    Physical Exam  Eyes:      Pupils: Pupils are equal, round, and reactive to light.   Cardiovascular:      Rate and Rhythm: Tachycardia present. Rhythm irregular.   Pulmonary:      Breath sounds: No wheezing or rales.   Abdominal:      Tenderness: There is no abdominal tenderness.   Genitourinary:     Comments: Scrotal swelling  Musculoskeletal:      Cervical back: Neck supple.      Right lower leg: Edema present.      Left lower leg: Edema present.   Neurological:      Mental Status: He is alert.       Significant Labs:  BMP:   Recent Labs   Lab 05/14/22  0445         K 4.7   CL 99   CO2 41*   BUN 86*   CREATININE 3.22*   CALCIUM 9.0        Significant Imaging:      Assessment/Plan:     * Acute on chronic biventricular congestive heart failure    R and L ventricular failure with cirrhosis.      Renal failure (ARF), acute on chronic  Urine output greater than 6 L/24 hours.  Creatinine slightly improved.  He is developing metabolic alkalosis.  Hold metolazone.  Continue Bumex and dopamine.    Cirrhosis  Secondary to right heart failure and previous ethanol use    HTN (hypertension)  BP borderline low    A-fib  Chronic Afib.  Rate is controlled with amiodarone and digoxin.    CKD  (chronic kidney disease)  CKD 3        Thank you for your consult.     Jaiden Grant MD  Nephrology  [facility]

## 2022-05-15 NOTE — NURSING
02 sat prob applied to forehead and to R ear, 02 range varies, and patient is not in any respiratory distress

## 2022-05-15 NOTE — ASSESSMENT & PLAN NOTE
Renal function has improved due to improved perfusion while on dopamine.  He is diuresing at a brisk rate.

## 2022-05-15 NOTE — SUBJECTIVE & OBJECTIVE
Interval History:  He is alert.  He has multiple visitors today.  He denies SOB.  He states he feels better today    Review of patient's allergies indicates:   Allergen Reactions    Lasix [furosemide]      Current Facility-Administered Medications   Medication Frequency    acetaminophen tablet 650 mg Q4H PRN    albuterol-ipratropium 2.5 mg-0.5 mg/3 mL nebulizer solution 3 mL Q6H    amiodarone tablet 200 mg BID    apixaban tablet 5 mg BID    bisacodyL suppository 10 mg Daily PRN    bumetanide injection 3 mg BID    buPROPion tablet 75 mg BID    calamine-zinc oxide 8-8% solution QID PRN    DOPamine 800 mg in dextrose 5 % 250 mL infusion (premix) (NON-TITRATING) Continuous    famotidine tablet 20 mg Daily    hydrALAZINE injection 10 mg Q6H PRN    lactulose 20 gram/30 mL solution Soln 20 g QID    melatonin tablet 6 mg Nightly PRN    metoprolol tartrate (LOPRESSOR) tablet 25 mg BID    miconazole NITRATE 2 % top powder BID    midodrine tablet 5 mg TID WM    ondansetron tablet 8 mg Q8H PRN    polyethylene glycol packet 17 g Daily    polyvinyl alcohol (artificial tears) 1.4 % ophthalmic solution 1 drop PRN    prochlorperazine injection Soln 5 mg Q6H PRN    QUEtiapine tablet 50 mg BID    rifAXIMin tablet 550 mg BID    silver sulfADIAZINE 1% cream PRN       Objective:     Vital Signs (Most Recent):  Temp: 97.2 °F (36.2 °C) (05/15/22 1200)  Pulse: (!) 133 (05/15/22 1415)  Resp: 19 (05/15/22 1415)  BP: 107/62 (05/15/22 1400)  SpO2: (!) 83 % (05/15/22 1415)  O2 Device (Oxygen Therapy): nasal cannula (05/15/22 1309)   Vital Signs (24h Range):  Temp:  [97.2 °F (36.2 °C)-98.8 °F (37.1 °C)] 97.2 °F (36.2 °C)  Pulse:  [] 133  Resp:  [14-35] 19  SpO2:  [72 %-100 %] 83 %  BP: ()/(44-84) 107/62     Weight: 115.3 kg (254 lb 3.1 oz) (05/14/22 0600)  Body mass index is 31.77 kg/m².  Body surface area is 2.47 meters squared.    I/O last 3 completed shifts:  In: 120 [P.O.:120]  Out: 8950 [Urine:8950]    Physical Exam  Eyes:       Pupils: Pupils are equal, round, and reactive to light.   Cardiovascular:      Rate and Rhythm: Tachycardia present. Rhythm irregular.   Pulmonary:      Breath sounds: No wheezing or rales.   Abdominal:      Tenderness: There is no abdominal tenderness.   Genitourinary:     Comments: Scrotal swelling  Musculoskeletal:      Cervical back: Neck supple.      Right lower leg: Edema present.      Left lower leg: Edema present.   Neurological:      Mental Status: He is alert.       Significant Labs:  BMP:   Recent Labs   Lab 05/15/22  0339         K 4.1      CO2 41*   BUN 80*   CREATININE 2.86*   CALCIUM 9.0     CBC:   Recent Labs   Lab 05/10/22  0342   WBC 6.89   RBC 2.97*   HGB 9.0*   HCT 29.0*   *   MCV 97.6*   MCH 30.3   MCHC 31.0*        Significant Imaging:

## 2022-05-15 NOTE — SUBJECTIVE & OBJECTIVE
Interval History:     NAEO  Cr improved, bumex was increased yesterday, though metolazone being held today d/t c/f metabolic alkalosis by renal  No new complains.       Review of Systems   Constitutional:  Positive for fatigue.   Respiratory:  Positive for shortness of breath.    Cardiovascular:  Positive for leg swelling.   Psychiatric/Behavioral:  Positive for confusion.    Objective:     Vital Signs (Most Recent):  Temp: 98.5 °F (36.9 °C) (05/14/22 2345)  Pulse: 95 (05/15/22 0132)  Resp: (!) 24 (05/15/22 0132)  BP: 102/62 (05/14/22 2345)  SpO2: 96 % (05/15/22 0132)   Vital Signs (24h Range):  Temp:  [97.7 °F (36.5 °C)-98.8 °F (37.1 °C)] 98.5 °F (36.9 °C)  Pulse:  [] 95  Resp:  [14-35] 24  SpO2:  [65 %-100 %] 96 %  BP: ()/() 102/62     Weight: 115.3 kg (254 lb 3.1 oz)  Body mass index is 31.77 kg/m².    Intake/Output Summary (Last 24 hours) at 5/15/2022 0648  Last data filed at 5/14/2022 1800  Gross per 24 hour   Intake 120 ml   Output 3800 ml   Net -3680 ml        Physical Exam  Constitutional:       Appearance: He is ill-appearing.   Eyes:      Pupils: Pupils are equal, round, and reactive to light.   Cardiovascular:      Rate and Rhythm: Tachycardia present. Rhythm irregular.   Pulmonary:      Breath sounds: No wheezing or rales.   Abdominal:      Tenderness: There is no abdominal tenderness.   Genitourinary:     Comments: Scrotal swelling  Musculoskeletal:      Cervical back: Neck supple.      Right lower leg: Edema present.      Left lower leg: Edema present.   Neurological:      Mental Status: He is alert. Mental status is at baseline.      Comments: Confusion/dementia/HE.        Significant Labs: All pertinent labs within the past 24 hours have been reviewed.    Significant Imaging: I have reviewed all pertinent imaging results/findings within the past 24 hours.

## 2022-05-15 NOTE — PROGRESS NOTES
[facility] Medicine  Progress Note    Patient Name: Modesto Patrick  MRN: [id number]  Patient Class: IP- Inpatient   Admission Date: 5/9/2022  Length of Stay: 6 days  Attending Physician: Neeta Solis MD  Primary Care Provider: Primary Doctor No        Subjective:     Principal Problem:Biventricular congestive heart failure        HPI:  The patient is a 60yo AA male with a MedHX of CHF, paroxysmal afib, chronic anticoagulation of eliquis, HTN, HLD, AAA repair, HLD, COPD on home oxygen, CKD stage III, cirrhosis and GERD who presents to LTAC for long-term care of his CHF exacerbation and acute on chronic renal failure. The patient had originally been admitted to [facility] for CHF exacerbation on 04/27, where he was also found to have afib with RVR and heme positive stools. His Eliquis was held and GI saw him and did not recommend any inpatient GI workup. He was started back on his Eliquis and IV diuresis with bumex. This was stopped after his creatinine was found to be elevated above baseline. He was started back on diuretics after his volume status was found to still be decompensated. The patient's home metoprolol dose was also increased while at [facility], and his home losartan, was held.    The patient was found to be stable at [facility] and was accepted to LTAC for further monitoring/care by Dr. Solis for his acute on chronic CHF exacerbation.      Overview/Hospital Course:  05/11 Awake and resting in bed. Having sob. Edematous.  Sat is 96%. Vitals signs stable. Recheck potassium level today.  05/12 Mr. Patrick had afib with RVR and HF on 05/11. Required IV amiodarone and dopamine. Heart rate this am is in 70s - will dc amio drip and transition to amiodarone po. States he is feeling about the same.      Interval History:     NAEO  Cr improved, bumex was increased yesterday, though metolazone being held today d/t c/f metabolic alkalosis by renal  No new  complains.       Review of Systems   Constitutional:  Positive for fatigue.   Respiratory:  Positive for shortness of breath.    Cardiovascular:  Positive for leg swelling.   Psychiatric/Behavioral:  Positive for confusion.    Objective:     Vital Signs (Most Recent):  Temp: 98.5 °F (36.9 °C) (05/14/22 2345)  Pulse: 95 (05/15/22 0132)  Resp: (!) 24 (05/15/22 0132)  BP: 102/62 (05/14/22 2345)  SpO2: 96 % (05/15/22 0132)   Vital Signs (24h Range):  Temp:  [97.7 °F (36.5 °C)-98.8 °F (37.1 °C)] 98.5 °F (36.9 °C)  Pulse:  [] 95  Resp:  [14-35] 24  SpO2:  [65 %-100 %] 96 %  BP: ()/() 102/62     Weight: 115.3 kg (254 lb 3.1 oz)  Body mass index is 31.77 kg/m².    Intake/Output Summary (Last 24 hours) at 5/15/2022 0648  Last data filed at 5/14/2022 1800  Gross per 24 hour   Intake 120 ml   Output 3800 ml   Net -3680 ml        Physical Exam  Constitutional:       Appearance: He is ill-appearing.   Eyes:      Pupils: Pupils are equal, round, and reactive to light.   Cardiovascular:      Rate and Rhythm: Tachycardia present. Rhythm irregular.   Pulmonary:      Breath sounds: No wheezing or rales.   Abdominal:      Tenderness: There is no abdominal tenderness.   Genitourinary:     Comments: Scrotal swelling  Musculoskeletal:      Cervical back: Neck supple.      Right lower leg: Edema present.      Left lower leg: Edema present.   Neurological:      Mental Status: He is alert. Mental status is at baseline.      Comments: Confusion/dementia/HE.        Significant Labs: All pertinent labs within the past 24 hours have been reviewed.    Significant Imaging: I have reviewed all pertinent imaging results/findings within the past 24 hours.      Assessment/Plan:      * Acute on chronic biventricular congestive heart failure  - Continuous oxygen  - Continue bumetinide 1mg IV bid  - duonebs  - Cardiology consult  - ECHO pending  - telemetry  - continue metoprolol 50mg bid   - daily weights  - daily I+O  - low sodium  diet  - dietary consult      05/11 sob this morning  02 per nc   Cardiology consulted       05/12 continue diuresis   Continue dopamine drip    GERD (gastroesophageal reflux disease)  - famotidine injection 20mg bID      Multiple wounds of skin  - has bilateral chronic lower extremity skin changes/wounds  - wound care consult       Depression  - continue buproprion      Cirrhosis  - continue rifaximin 550mg bid  - continue lactulose 20mg qid      HTN (hypertension)  - continue metoprolol 50mg bid  -continue lopressor          A-fib  - continue eliquis 2.5mg bid  - continue amiodarone 200mg bid  - telemetry      05/11  - on amiodarone and lopressor   -continue eliquis    05/12 pulse in 70s   Dc amiodarone drip and transition to po amiodarone    CKD (chronic kidney disease)  - bmp pending  - records say creatinine at baseline at 3.3 today at [facility]      COPD (chronic obstructive pulmonary disease)  - continual oxygen via NC  - duonebs q6        VTE Risk Mitigation (From admission, onward)         Ordered     apixaban tablet 5 mg  2 times daily         05/12/22 0925     IP VTE HIGH RISK PATIENT  Once         05/09/22 1736     Place sequential compression device  Until discontinued         05/09/22 1736                Discharge Planning   DIONY:      Code Status: DNR   Is the patient medically ready for discharge?:     Reason for patient still in hospital (select all that apply): Treatment  Discharge Plan A: Home with family, Home Health                  Rehmat LATOYA Solis MD  Department of Hospital Medicine   [facility]

## 2022-05-16 PROBLEM — K59.00 CONSTIPATION: Status: ACTIVE | Noted: 2022-05-16

## 2022-05-16 LAB
ALBUMIN SERPL BCP-MCNC: 3.2 G/DL (ref 3.5–5)
ALBUMIN/GLOB SERPL: 1.1 {RATIO}
ALP SERPL-CCNC: 91 U/L (ref 45–115)
ALT SERPL W P-5'-P-CCNC: 18 U/L (ref 16–61)
ANION GAP SERPL CALCULATED.3IONS-SCNC: 12 MMOL/L (ref 7–16)
AST SERPL W P-5'-P-CCNC: 18 U/L (ref 15–37)
BILIRUB SERPL-MCNC: 0.5 MG/DL (ref 0–1.2)
BUN SERPL-MCNC: 71 MG/DL (ref 7–18)
BUN/CREAT SERPL: 29 (ref 6–20)
CALCIUM SERPL-MCNC: 9.3 MG/DL (ref 8.5–10.1)
CHLORIDE SERPL-SCNC: 95 MMOL/L (ref 98–107)
CO2 SERPL-SCNC: 41 MMOL/L (ref 21–32)
CREAT SERPL-MCNC: 2.42 MG/DL (ref 0.7–1.3)
GLOBULIN SER-MCNC: 2.8 G/DL (ref 2–4)
GLUCOSE SERPL-MCNC: 130 MG/DL (ref 74–106)
POTASSIUM SERPL-SCNC: 4.3 MMOL/L (ref 3.5–5.1)
PROT SERPL-MCNC: 6 G/DL (ref 6.4–8.2)
SODIUM SERPL-SCNC: 144 MMOL/L (ref 136–145)

## 2022-05-16 PROCEDURE — 99233 SBSQ HOSP IP/OBS HIGH 50: CPT | Mod: ,,, | Performed by: INTERNAL MEDICINE

## 2022-05-16 PROCEDURE — 63600175 PHARM REV CODE 636 W HCPCS: Performed by: INTERNAL MEDICINE

## 2022-05-16 PROCEDURE — 11000008

## 2022-05-16 PROCEDURE — 25000003 PHARM REV CODE 250: Performed by: INTERNAL MEDICINE

## 2022-05-16 PROCEDURE — 97110 THERAPEUTIC EXERCISES: CPT | Mod: CQ

## 2022-05-16 PROCEDURE — 99233 PR SUBSEQUENT HOSPITAL CARE,LEVL III: ICD-10-PCS | Mod: ,,, | Performed by: INTERNAL MEDICINE

## 2022-05-16 PROCEDURE — 80053 COMPREHEN METABOLIC PANEL: CPT | Performed by: INTERNAL MEDICINE

## 2022-05-16 PROCEDURE — 94640 AIRWAY INHALATION TREATMENT: CPT

## 2022-05-16 PROCEDURE — 36415 COLL VENOUS BLD VENIPUNCTURE: CPT | Performed by: INTERNAL MEDICINE

## 2022-05-16 PROCEDURE — 25000003 PHARM REV CODE 250: Performed by: STUDENT IN AN ORGANIZED HEALTH CARE EDUCATION/TRAINING PROGRAM

## 2022-05-16 PROCEDURE — 97110 THERAPEUTIC EXERCISES: CPT

## 2022-05-16 PROCEDURE — 25000003 PHARM REV CODE 250: Performed by: NURSE PRACTITIONER

## 2022-05-16 PROCEDURE — 25000003 PHARM REV CODE 250

## 2022-05-16 PROCEDURE — 27000221 HC OXYGEN, UP TO 24 HOURS

## 2022-05-16 PROCEDURE — 25000242 PHARM REV CODE 250 ALT 637 W/ HCPCS

## 2022-05-16 PROCEDURE — 63600150 PHARM REV CODE 636: Performed by: STUDENT IN AN ORGANIZED HEALTH CARE EDUCATION/TRAINING PROGRAM

## 2022-05-16 RX ORDER — AMIODARONE HYDROCHLORIDE 200 MG/1
200 TABLET ORAL DAILY
Status: DISCONTINUED | OUTPATIENT
Start: 2022-05-16 | End: 2022-05-18 | Stop reason: HOSPADM

## 2022-05-16 RX ORDER — METOLAZONE 5 MG/1
5 TABLET ORAL DAILY
Status: DISCONTINUED | OUTPATIENT
Start: 2022-05-17 | End: 2022-05-18 | Stop reason: HOSPADM

## 2022-05-16 RX ORDER — BUMETANIDE 1 MG/1
3 TABLET ORAL 2 TIMES DAILY
Status: DISCONTINUED | OUTPATIENT
Start: 2022-05-16 | End: 2022-05-18 | Stop reason: HOSPADM

## 2022-05-16 RX ORDER — APIXABAN 5 MG/1
5 TABLET, FILM COATED ORAL 2 TIMES DAILY
COMMUNITY
Start: 2022-04-11

## 2022-05-16 RX ORDER — SYRING-NEEDL,DISP,INSUL,0.3 ML 29 G X1/2"
296 SYRINGE, EMPTY DISPOSABLE MISCELLANEOUS ONCE
Status: COMPLETED | OUTPATIENT
Start: 2022-05-16 | End: 2022-05-16

## 2022-05-16 RX ADMIN — MIDODRINE HYDROCHLORIDE 5 MG: 5 TABLET ORAL at 11:05

## 2022-05-16 RX ADMIN — RIFAXIMIN 550 MG: 550 TABLET ORAL at 08:05

## 2022-05-16 RX ADMIN — FAMOTIDINE 20 MG: 20 TABLET ORAL at 08:05

## 2022-05-16 RX ADMIN — LACTULOSE 20 G: 20 SOLUTION ORAL at 12:05

## 2022-05-16 RX ADMIN — AMIODARONE HYDROCHLORIDE 200 MG: 200 TABLET ORAL at 04:05

## 2022-05-16 RX ADMIN — DEXTROSE MONOHYDRATE 0.5 MG/MIN: 50 INJECTION, SOLUTION INTRAVENOUS at 12:05

## 2022-05-16 RX ADMIN — MICONAZOLE NITRATE 2 % TOPICAL POWDER: at 09:05

## 2022-05-16 RX ADMIN — BUMETANIDE 3 MG: 1 TABLET ORAL at 04:05

## 2022-05-16 RX ADMIN — QUETIAPINE 50 MG: 25 TABLET ORAL at 09:05

## 2022-05-16 RX ADMIN — IPRATROPIUM BROMIDE AND ALBUTEROL SULFATE 3 ML: 2.5; .5 SOLUTION RESPIRATORY (INHALATION) at 12:05

## 2022-05-16 RX ADMIN — METOPROLOL TARTRATE 25 MG: 25 TABLET, FILM COATED ORAL at 08:05

## 2022-05-16 RX ADMIN — MIDODRINE HYDROCHLORIDE 5 MG: 5 TABLET ORAL at 04:05

## 2022-05-16 RX ADMIN — LACTULOSE 20 G: 20 SOLUTION ORAL at 08:05

## 2022-05-16 RX ADMIN — LACTULOSE 20 G: 20 SOLUTION ORAL at 04:05

## 2022-05-16 RX ADMIN — DOPAMINE HYDROCHLORIDE IN DEXTROSE 5 MCG/KG/MIN: 3.2 INJECTION, SOLUTION INTRAVENOUS at 11:05

## 2022-05-16 RX ADMIN — APIXABAN 5 MG: 5 TABLET, FILM COATED ORAL at 08:05

## 2022-05-16 RX ADMIN — RIFAXIMIN 550 MG: 550 TABLET ORAL at 09:05

## 2022-05-16 RX ADMIN — LACTULOSE 20 G: 20 SOLUTION ORAL at 09:05

## 2022-05-16 RX ADMIN — BUMETANIDE 3 MG: 0.25 INJECTION, SOLUTION INTRAMUSCULAR; INTRAVENOUS at 08:05

## 2022-05-16 RX ADMIN — POLYETHYLENE GLYCOL 3350 17 G: 17 POWDER, FOR SOLUTION ORAL at 08:05

## 2022-05-16 RX ADMIN — METOPROLOL TARTRATE 25 MG: 25 TABLET, FILM COATED ORAL at 09:05

## 2022-05-16 RX ADMIN — IPRATROPIUM BROMIDE AND ALBUTEROL SULFATE 3 ML: 2.5; .5 SOLUTION RESPIRATORY (INHALATION) at 02:05

## 2022-05-16 RX ADMIN — IPRATROPIUM BROMIDE AND ALBUTEROL SULFATE 3 ML: 2.5; .5 SOLUTION RESPIRATORY (INHALATION) at 09:05

## 2022-05-16 RX ADMIN — APIXABAN 5 MG: 5 TABLET, FILM COATED ORAL at 09:05

## 2022-05-16 RX ADMIN — BUPROPION HYDROCHLORIDE 75 MG: 75 TABLET, FILM COATED ORAL at 09:05

## 2022-05-16 RX ADMIN — IPRATROPIUM BROMIDE AND ALBUTEROL SULFATE 3 ML: 2.5; .5 SOLUTION RESPIRATORY (INHALATION) at 07:05

## 2022-05-16 RX ADMIN — MIDODRINE HYDROCHLORIDE 5 MG: 5 TABLET ORAL at 08:05

## 2022-05-16 RX ADMIN — AMIODARONE HYDROCHLORIDE 200 MG: 200 TABLET ORAL at 08:05

## 2022-05-16 RX ADMIN — AMIODARONE HYDROCHLORIDE 0.5 MG/MIN: 1.8 INJECTION, SOLUTION INTRAVENOUS at 01:05

## 2022-05-16 RX ADMIN — Medication 296 ML: at 02:05

## 2022-05-16 NOTE — ASSESSMENT & PLAN NOTE
- continue eliquis 2.5mg bid  - continue amiodarone 200mg bid  - telemetry      05/11  - on amiodarone and lopressor   -continue eliquis    05/12 pulse in 70s   Dc amiodarone drip and transition to po amiodarone    5/16 oral amiodarone discontinued due to the fact that he is on amiodarone infusion at this moment.  Await Cardiology input.

## 2022-05-16 NOTE — PLAN OF CARE
Problem: Adult Inpatient Plan of Care  Goal: Optimal Comfort and Wellbeing  Outcome: Ongoing, Progressing  Intervention: Monitor Pain and Promote Comfort  Flowsheets (Taken 5/15/2022 1916)  Pain Management Interventions:   pillow support provided   position adjusted

## 2022-05-16 NOTE — ASSESSMENT & PLAN NOTE
- Continuous oxygen  - Continue bumetinide 1mg IV bid  - duceasar  - Cardiology consult  - ECHO pending  - telemetry  - continue metoprolol 50mg bid   - daily weights  - daily I+O  - low sodium diet  - dietary consult      05/11 sob this morning  02 per nc   Cardiology consulted       05/12 continue diuresis   Continue dopamine drip    5/16 met this morning on oral and IV infusion of amiodarone which was added yesterday due to AFib RVR.  Patient being diuresed.  Feeling better overall.  Will get Cardiology input on his medications - need an oral regimen for discharge home.  Discussed with his sister Eber with open have the patient back home to his end of this week on hospice.  He does not wear Keen catheter at home and therefore we will get this discontinued today.  Case discussed with  for her to get discharge plan in progress.

## 2022-05-16 NOTE — NURSING
Obdulio Haddad (YAO) called and informed on patient change in status. She is coming to see him next visitation.

## 2022-05-16 NOTE — PT/OT/SLP PROGRESS
Occupational Therapy   Treatment    Name: Modesto Patrick  MRN: [id number]  Admitting Diagnosis:  Biventricular congestive heart failure       Recommendations:     Discharge Recommendations: nursing facility, skilled  Discharge Equipment Recommendations:   (to be determined)  Barriers to discharge:  None    Assessment:     Modesto Patrick is a 61 y.o. male with a medical diagnosis of Biventricular congestive heart failure.  He presents with complaint of abdominal pain. Nurse notified. Pt reports being able to perform UE exercises.. Performance deficits affecting function are weakness, impaired endurance.     Rehab Prognosis:  Good; patient would benefit from acute skilled OT services to address these deficits and reach maximum level of function.       Plan:     Patient to be seen 5 x/week to address the above listed problems via self-care/home management, therapeutic activities, therapeutic exercises  · Plan of Care Expires: 05/17/22  · Plan of Care Reviewed with: patient    Subjective     Pain/Comfort:  · Pain Rating 1: 9/10  · Location 1: abdomen  · Pain Addressed 1: Nurse notified, Distraction  · Pain Rating Post-Intervention 1: 9/10    Objective:     Communicated with: JANETH Willard prior to session.  Patient found up in chair with blood pressure cuff, pulse ox (continuous), amor catheter, oxygen, peripheral IV upon OT entry to room.    General Precautions: Standard, fall   Orthopedic Precautions:N/A   Braces: N/A  Respiratory Status: Nasal cannula, flow 2 L/min     Occupational Performance:     Bed Mobility:    ·      Functional Mobility/Transfers:  ·   · Functional Mobility:     Activities of Daily Living:  ·       AMPAC 6 Click ADL:      Treatment & Education:  2 lb wrist wt on (L) and 2 lb hand wt on (R) x 2/15 reps shoulder flexion/extension, adduction/abduction and (B) elbow and wrist flexion/extension. Red theraband x 2 sets of 15 reps (R) elbow flexion and extension.   Pt participated well with  exercises. (B) UE supported on a pillow at the end of tx due to edema.    Patient left up in chair with all lines intact, call button in reach and nurse notifiedEducation:      GOALS:   Multidisciplinary Problems     Occupational Therapy Goals        Problem: Occupational Therapy    Goal Priority Disciplines Outcome Interventions   Occupational Therapy Goal     OT, PT/OT Ongoing, Progressing    Description: Pt is getting a 1 week trial to determine pt's ability to participate and progress with OT services.  STG:  Pt will perform grooming with setup  Pt will bathe with min (A) for upper body anterior  Pt will perform UE dressing with setup and min(A)  Pt will sit EOB x 7 min with CGA/ min(A)  Pt will tolerate 20 minutes of tx without fatigue      LT.Restore to max I with self care and mobility.                      Time Tracking:     OT Date of Treatment: 22  OT Start Time: 1320  OT Stop Time: 1347  OT Total Time (min): 27 min    Billable Minutes:Therapeutic Exercise 20    OT/GILDA: OT          2022

## 2022-05-16 NOTE — PROGRESS NOTES
[facility] Medicine  Progress Note    Patient Name: Modesto Patrick  MRN: [id number]  Patient Class: IP- Inpatient   Admission Date: 5/9/2022  Length of Stay: 7 days  Attending Physician: Leah Lora,*  Primary Care Provider: Primary Doctor No        Subjective:     Principal Problem:Biventricular congestive heart failure        HPI:  The patient is a 60yo AA male with a MedHX of CHF, paroxysmal afib, chronic anticoagulation of eliquis, HTN, HLD, AAA repair, HLD, COPD on home oxygen, CKD stage III, cirrhosis and GERD who presents to LTAC for long-term care of his CHF exacerbation and acute on chronic renal failure. The patient had originally been admitted to [facility] for CHF exacerbation on 04/27, where he was also found to have afib with RVR and heme positive stools. His Eliquis was held and GI saw him and did not recommend any inpatient GI workup. He was started back on his Eliquis and IV diuresis with bumex. This was stopped after his creatinine was found to be elevated above baseline. He was started back on diuretics after his volume status was found to still be decompensated. The patient's home metoprolol dose was also increased while at [facility], and his home losartan, was held.    The patient was found to be stable at [facility] and was accepted to LTAC for further monitoring/care by Dr. Solis for his acute on chronic CHF exacerbation.      Overview/Hospital Course:  05/11 Awake and resting in bed. Having sob. Edematous.  Sat is 96%. Vitals signs stable. Recheck potassium level today.  05/12 Mr. Patrick had afib with RVR and HF on 05/11. Required IV amiodarone and dopamine. Heart rate this am is in 70s - will dc amio drip and transition to amiodarone po. States he is feeling about the same.      Interval History:  Patient doing okay, met sitting in the chair.  He complains of abdominal discomfort and says he has not moved his bowels in 4  days.  Also some discomfort from Keen catheter.    Review of Systems   Constitutional:  Negative for appetite change, chills and fever.   Respiratory:  Negative for cough, shortness of breath and wheezing.    Cardiovascular:  Positive for leg swelling. Negative for chest pain.   Gastrointestinal:  Positive for abdominal pain and constipation. Negative for diarrhea, nausea and vomiting.   Genitourinary:  Negative for difficulty urinating and hematuria.   Skin:  Negative for rash and wound.   Objective:     Vital Signs (Most Recent):  Temp: 98.5 °F (36.9 °C) (05/16/22 1100)  Pulse: 87 (05/16/22 1405)  Resp: (!) 25 (05/16/22 1405)  BP: (!) 84/61 (05/16/22 1200)  SpO2: 100 % (05/16/22 1405)   Vital Signs (24h Range):  Temp:  [97 °F (36.1 °C)-98.7 °F (37.1 °C)] 98.5 °F (36.9 °C)  Pulse:  [] 87  Resp:  [15-34] 25  SpO2:  [69 %-100 %] 100 %  BP: ()/(22-87) 84/61     Weight: 115.3 kg (254 lb 3.1 oz)  Body mass index is 31.77 kg/m².    Intake/Output Summary (Last 24 hours) at 5/16/2022 1548  Last data filed at 5/16/2022 1400  Gross per 24 hour   Intake 1437 ml   Output 8100 ml   Net -6663 ml      Physical Exam  Constitutional:       General: He is not in acute distress.     Appearance: He is obese. He is not toxic-appearing.   HENT:      Mouth/Throat:      Mouth: Mucous membranes are moist.      Pharynx: Oropharynx is clear. No oropharyngeal exudate.   Eyes:      General: No scleral icterus.        Right eye: No discharge.         Left eye: No discharge.   Cardiovascular:      Rate and Rhythm: Regular rhythm.      Heart sounds: Murmur heard.   Pulmonary:      Effort: No respiratory distress.      Breath sounds: No wheezing, rhonchi or rales.   Abdominal:      General: Bowel sounds are normal. There is no distension.      Palpations: There is no mass.      Tenderness: There is no abdominal tenderness. There is no guarding or rebound.   Genitourinary:     Rectum: Guaiac stool: Clear urine from Keen catheter.    Musculoskeletal:         General: Swelling present.      Right lower leg: Edema present.      Left lower leg: Edema present.   Skin:     Coloration: Skin is not jaundiced.      Findings: No rash.   Neurological:      Mental Status: He is alert.       Significant Labs: All pertinent labs within the past 24 hours have been reviewed.  BMP:   Recent Labs   Lab 05/16/22  0600   *      K 4.3   CL 95*   CO2 41*   BUN 71*   CREATININE 2.42*   CALCIUM 9.3     CBC: No results for input(s): WBC, HGB, HCT, PLT in the last 48 hours.  CMP:   Recent Labs   Lab 05/15/22  0339 05/16/22  0600    144   K 4.1 4.3    95*   CO2 41* 41*    130*   BUN 80* 71*   CREATININE 2.86* 2.42*   CALCIUM 9.0 9.3   PROT 5.7* 6.0*   ALBUMIN 3.1* 3.2*   BILITOT 0.6 0.5   ALKPHOS 89 91   AST 9* 18   ALT 15* 18   ANIONGAP 5* 12   EGFRNONAA 24* 29*       Significant Imaging: I have reviewed all pertinent imaging results/findings within the past 24 hours.      Assessment/Plan:      * Acute on chronic biventricular congestive heart failure  - Continuous oxygen  - Continue bumetinide 1mg IV bid  - duonebs  - Cardiology consult  - ECHO pending  - telemetry  - continue metoprolol 50mg bid   - daily weights  - daily I+O  - low sodium diet  - dietary consult      05/11 sob this morning  02 per nc   Cardiology consulted       05/12 continue diuresis   Continue dopamine drip    5/16 met this morning on oral and IV infusion of amiodarone which was added yesterday due to AFib RVR.  Patient being diuresed.  Feeling better overall.  Will get Cardiology input on his medications - need an oral regimen for discharge home.  Discussed with his sister Eber with open have the patient back home to his end of this week on hospice.  He does not wear Keen catheter at home and therefore we will get this discontinued today.  Case discussed with  for her to get discharge plan in progress.    GERD (gastroesophageal reflux disease)  -  famotidine injection 20mg bID      Multiple wounds of skin  - has bilateral chronic lower extremity skin changes/wounds  - wound care consult       Depression  - continue buproprion      Cirrhosis  - continue rifaximin 550mg bid  - continue lactulose 20mg qid      HTN (hypertension)  - continue metoprolol 50mg bid  -continue lopressor          A-fib  - continue eliquis 2.5mg bid  - continue amiodarone 200mg bid  - telemetry      05/11  - on amiodarone and lopressor   -continue eliquis    05/12 pulse in 70s   Dc amiodarone drip and transition to po amiodarone    5/16 oral amiodarone discontinued due to the fact that he is on amiodarone infusion at this moment.  Await Cardiology input.    CKD (chronic kidney disease)  - bmp pending  - records say creatinine at baseline at 3.3 today at [place]    5/16 creatinine overall improved.  Continue monitoring.      COPD (chronic obstructive pulmonary disease)  - continual oxygen via NC  - duonebs q6      VTE Risk Mitigation (From admission, onward)         Ordered     apixaban tablet 5 mg  2 times daily         05/12/22 0925     IP VTE HIGH RISK PATIENT  Once         05/09/22 1736     Place sequential compression device  Until discontinued         05/09/22 1736                Discharge Planning   DIONY:      Code Status: DNR   Is the patient medically ready for discharge?:     Reason for patient still in hospital (select all that apply): Treatment  Discharge Plan A: Home with family, Home Health                  Leah Lora MD  Department of Hospital Medicine   [facility]

## 2022-05-16 NOTE — PLAN OF CARE
SS was notified that pt should be ready for discharge home soon. SS left message for pt's daughter to return call. SS notified Negin with [facility] pt should be ready in next two days.

## 2022-05-16 NOTE — NURSING
Resting in bed with eyes open, amiodarone infusing @33.3ml/hr via L picc. No acute distress noted @ this time, report given to oncoming shift.

## 2022-05-16 NOTE — SUBJECTIVE & OBJECTIVE
Interval History:  Patient doing okay, met sitting in the chair.  He complains of abdominal discomfort and says he has not moved his bowels in 4 days.  Also some discomfort from Keen catheter.    Review of Systems   Constitutional:  Negative for appetite change, chills and fever.   Respiratory:  Negative for cough, shortness of breath and wheezing.    Cardiovascular:  Positive for leg swelling. Negative for chest pain.   Gastrointestinal:  Positive for abdominal pain and constipation. Negative for diarrhea, nausea and vomiting.   Genitourinary:  Negative for difficulty urinating and hematuria.   Skin:  Negative for rash and wound.   Objective:     Vital Signs (Most Recent):  Temp: 98.5 °F (36.9 °C) (05/16/22 1100)  Pulse: 87 (05/16/22 1405)  Resp: (!) 25 (05/16/22 1405)  BP: (!) 84/61 (05/16/22 1200)  SpO2: 100 % (05/16/22 1405)   Vital Signs (24h Range):  Temp:  [97 °F (36.1 °C)-98.7 °F (37.1 °C)] 98.5 °F (36.9 °C)  Pulse:  [] 87  Resp:  [15-34] 25  SpO2:  [69 %-100 %] 100 %  BP: ()/(22-87) 84/61     Weight: 115.3 kg (254 lb 3.1 oz)  Body mass index is 31.77 kg/m².    Intake/Output Summary (Last 24 hours) at 5/16/2022 1548  Last data filed at 5/16/2022 1400  Gross per 24 hour   Intake 1437 ml   Output 8100 ml   Net -6663 ml      Physical Exam  Constitutional:       General: He is not in acute distress.     Appearance: He is obese. He is not toxic-appearing.   HENT:      Mouth/Throat:      Mouth: Mucous membranes are moist.      Pharynx: Oropharynx is clear. No oropharyngeal exudate.   Eyes:      General: No scleral icterus.        Right eye: No discharge.         Left eye: No discharge.   Cardiovascular:      Rate and Rhythm: Regular rhythm.      Heart sounds: Murmur heard.   Pulmonary:      Effort: No respiratory distress.      Breath sounds: No wheezing, rhonchi or rales.   Abdominal:      General: Bowel sounds are normal. There is no distension.      Palpations: There is no mass.      Tenderness:  There is no abdominal tenderness. There is no guarding or rebound.   Genitourinary:     Rectum: Guaiac stool: Clear urine from Keen catheter.   Musculoskeletal:         General: Swelling present.      Right lower leg: Edema present.      Left lower leg: Edema present.   Skin:     Coloration: Skin is not jaundiced.      Findings: No rash.   Neurological:      Mental Status: He is alert.       Significant Labs: All pertinent labs within the past 24 hours have been reviewed.  BMP:   Recent Labs   Lab 05/16/22  0600   *      K 4.3   CL 95*   CO2 41*   BUN 71*   CREATININE 2.42*   CALCIUM 9.3     CBC: No results for input(s): WBC, HGB, HCT, PLT in the last 48 hours.  CMP:   Recent Labs   Lab 05/15/22  0339 05/16/22  0600    144   K 4.1 4.3    95*   CO2 41* 41*    130*   BUN 80* 71*   CREATININE 2.86* 2.42*   CALCIUM 9.0 9.3   PROT 5.7* 6.0*   ALBUMIN 3.1* 3.2*   BILITOT 0.6 0.5   ALKPHOS 89 91   AST 9* 18   ALT 15* 18   ANIONGAP 5* 12   EGFRNONAA 24* 29*       Significant Imaging: I have reviewed all pertinent imaging results/findings within the past 24 hours.

## 2022-05-16 NOTE — ASSESSMENT & PLAN NOTE
- bmp pending  - records say creatinine at baseline at 3.3 today at [place]    5/16 creatinine overall improved.  Continue monitoring.

## 2022-05-16 NOTE — PLAN OF CARE
Problem: Adult Inpatient Plan of Care  Goal: Plan of Care Review  Outcome: Ongoing, Progressing     Problem: Gas Exchange Impaired  Goal: Optimal Gas Exchange  Outcome: Ongoing, Progressing

## 2022-05-16 NOTE — PT/OT/SLP PROGRESS
Physical Therapy Treatment    Patient Name:  Modesto Patrick   MRN:  [id number]    Recommendations:     Discharge Recommendations:  intermediate care facility/nursing home, rehabilitation facility, nursing facility, skilled   Discharge Equipment Recommendations: other (see comments) (to be determined)   Barriers to discharge: ongoing medical treatment    Assessment:     Modesto Patrick is a 61 y.o. male admitted with a medical diagnosis of Biventricular congestive heart failure.  He presents with the following impairments/functional limitations:  weakness, impaired functional mobilty, impaired cardiopulmonary response to activity, impaired endurance, impaired self care skills, impaired cognition.    Pt with increased confusion as compared to last treatment session, requiring increased verbal cueing to assist with activities and remain on task  Rehab Prognosis: Good and Fair; patient would benefit from acute skilled PT services to address these deficits and reach maximum level of function.    Recent Surgery: * No surgery found *      Plan:     During this hospitalization, patient to be seen 5 x/week to address the identified rehab impairments via therapeutic activities, therapeutic exercises, gait training and progress toward the following goals:    · Plan of Care Expires:  05/17/22    Subjective     Chief Complaint: congestive heart failure  Patient/Family Comments/goals: pt agreeable to PT  Pain/Comfort:         Objective:     Communicated with Janet Willard RN prior to session.  Patient found HOB elevated with blood pressure cuff, amor catheter, oxygen, peripheral IV, telemetry upon PT entry to room.     General Precautions: Standard, fall   Orthopedic Precautions:N/A   Braces:    Respiratory Status: Nasal cannula, flow 2 L/min     Functional Mobility:  · Bed Mobility:     · Supine to Sit: minimum assistance and with B LE managment, increased time/effort  · Transfers:     · Sit to Stand:  contact guard  assistance with rolling walker  · Bed to Chair: contact guard assistance with  rolling walker  using  Step Transfer  · Gait: 4' contact guard assist with RW; slow connor, short step length      AM-PAC 6 CLICK MOBILITY  Turning over in bed (including adjusting bedclothes, sheets and blankets)?: 3  Sitting down on and standing up from a chair with arms (e.g., wheelchair, bedside commode, etc.): 4  Moving from lying on back to sitting on the side of the bed?: 3  Moving to and from a bed to a chair (including a wheelchair)?: 3  Need to walk in hospital room?: 3  Climbing 3-5 steps with a railing?: 1  Basic Mobility Total Score: 17       Therapeutic Activities and Exercises:   Pt performed 30 reps (B) LE exercises: ap, quad set, glut set, straight leg raise, hip ab/adduction, long arc quad, heel slide with active assist     Patient left up in chair with all lines intact and call button in reach..    GOALS:   Multidisciplinary Problems     Physical Therapy Goals        Problem: Physical Therapy    Goal Priority Disciplines Outcome Goal Variances Interventions   Physical Therapy Goal     PT, PT/OT Ongoing, Progressing     Description: Short term goals to be met by 5/24/22:  1. Supine to sit with MInimal Assistance  2. Sit to stand transfer with Moderate Assistance  3. Bed to chair transfer with Moderate Assistance using Rolling Walker    Pt to be seen for 1 week trial to assess functional potential                      Time Tracking:     PT Received On: 05/16/22  PT Start Time: 1055     PT Stop Time: 1125  PT Total Time (min): 30 min     Billable Minutes: Therapeutic Exercise 15    Treatment Type: Treatment  PT/PTA: PTA     PTA Visit Number: 3     05/16/2022

## 2022-05-17 LAB
ALBUMIN SERPL BCP-MCNC: 3.1 G/DL (ref 3.5–5)
ALBUMIN/GLOB SERPL: 1.1 {RATIO}
ALP SERPL-CCNC: 91 U/L (ref 45–115)
ALT SERPL W P-5'-P-CCNC: 17 U/L (ref 16–61)
ANION GAP SERPL CALCULATED.3IONS-SCNC: 3 MMOL/L (ref 7–16)
AST SERPL W P-5'-P-CCNC: 17 U/L (ref 15–37)
BASOPHILS # BLD AUTO: 0.01 K/UL (ref 0–0.2)
BASOPHILS NFR BLD AUTO: 0.2 % (ref 0–1)
BILIRUB SERPL-MCNC: 0.7 MG/DL (ref 0–1.2)
BUN SERPL-MCNC: 75 MG/DL (ref 7–18)
BUN/CREAT SERPL: 29 (ref 6–20)
CALCIUM SERPL-MCNC: 8.8 MG/DL (ref 8.5–10.1)
CHLORIDE SERPL-SCNC: 88 MMOL/L (ref 98–107)
CO2 SERPL-SCNC: 50 MMOL/L (ref 21–32)
CREAT SERPL-MCNC: 2.55 MG/DL (ref 0.7–1.3)
DIFFERENTIAL METHOD BLD: ABNORMAL
EOSINOPHIL # BLD AUTO: 0.07 K/UL (ref 0–0.5)
EOSINOPHIL NFR BLD AUTO: 1.1 % (ref 1–4)
ERYTHROCYTE [DISTWIDTH] IN BLOOD BY AUTOMATED COUNT: 15.7 % (ref 11.5–14.5)
GLOBULIN SER-MCNC: 2.9 G/DL (ref 2–4)
GLUCOSE SERPL-MCNC: 93 MG/DL (ref 74–106)
HCT VFR BLD AUTO: 29.9 % (ref 40–54)
HGB BLD-MCNC: 9.4 G/DL (ref 13.5–18)
IMM GRANULOCYTES # BLD AUTO: 0.03 K/UL (ref 0–0.04)
IMM GRANULOCYTES NFR BLD: 0.5 % (ref 0–0.4)
LYMPHOCYTES # BLD AUTO: 1.88 K/UL (ref 1–4.8)
LYMPHOCYTES NFR BLD AUTO: 28.2 % (ref 27–41)
MCH RBC QN AUTO: 29.7 PG (ref 27–31)
MCHC RBC AUTO-ENTMCNC: 31.4 G/DL (ref 32–36)
MCV RBC AUTO: 94.6 FL (ref 80–96)
MONOCYTES # BLD AUTO: 1.21 K/UL (ref 0–0.8)
MONOCYTES NFR BLD AUTO: 18.2 % (ref 2–6)
MPC BLD CALC-MCNC: 10.7 FL (ref 9.4–12.4)
NEUTROPHILS # BLD AUTO: 3.46 K/UL (ref 1.8–7.7)
NEUTROPHILS NFR BLD AUTO: 51.8 % (ref 53–65)
NRBC # BLD AUTO: 0 X10E3/UL
NRBC, AUTO (.00): 0 %
PLATELET # BLD AUTO: 123 K/UL (ref 150–400)
POTASSIUM SERPL-SCNC: 4 MMOL/L (ref 3.5–5.1)
PROT SERPL-MCNC: 6 G/DL (ref 6.4–8.2)
RBC # BLD AUTO: 3.16 M/UL (ref 4.6–6.2)
SODIUM SERPL-SCNC: 137 MMOL/L (ref 136–145)
WBC # BLD AUTO: 6.66 K/UL (ref 4.5–11)

## 2022-05-17 PROCEDURE — 99232 SBSQ HOSP IP/OBS MODERATE 35: CPT | Mod: ,,, | Performed by: NURSE PRACTITIONER

## 2022-05-17 PROCEDURE — 99232 PR SUBSEQUENT HOSPITAL CARE,LEVL II: ICD-10-PCS | Mod: ,,, | Performed by: NURSE PRACTITIONER

## 2022-05-17 PROCEDURE — 25000003 PHARM REV CODE 250: Performed by: STUDENT IN AN ORGANIZED HEALTH CARE EDUCATION/TRAINING PROGRAM

## 2022-05-17 PROCEDURE — 11000008

## 2022-05-17 PROCEDURE — 27000221 HC OXYGEN, UP TO 24 HOURS

## 2022-05-17 PROCEDURE — 25000003 PHARM REV CODE 250: Performed by: INTERNAL MEDICINE

## 2022-05-17 PROCEDURE — 97116 GAIT TRAINING THERAPY: CPT | Mod: CQ

## 2022-05-17 PROCEDURE — 36415 COLL VENOUS BLD VENIPUNCTURE: CPT | Performed by: INTERNAL MEDICINE

## 2022-05-17 PROCEDURE — 99232 PR SUBSEQUENT HOSPITAL CARE,LEVL II: ICD-10-PCS | Mod: ,,, | Performed by: INTERNAL MEDICINE

## 2022-05-17 PROCEDURE — 94640 AIRWAY INHALATION TREATMENT: CPT

## 2022-05-17 PROCEDURE — 25000003 PHARM REV CODE 250: Performed by: NURSE PRACTITIONER

## 2022-05-17 PROCEDURE — 85025 COMPLETE CBC W/AUTO DIFF WBC: CPT | Performed by: INTERNAL MEDICINE

## 2022-05-17 PROCEDURE — 97535 SELF CARE MNGMENT TRAINING: CPT

## 2022-05-17 PROCEDURE — 97110 THERAPEUTIC EXERCISES: CPT

## 2022-05-17 PROCEDURE — 25000242 PHARM REV CODE 250 ALT 637 W/ HCPCS

## 2022-05-17 PROCEDURE — 80053 COMPREHEN METABOLIC PANEL: CPT | Performed by: INTERNAL MEDICINE

## 2022-05-17 PROCEDURE — 25000003 PHARM REV CODE 250

## 2022-05-17 PROCEDURE — 97110 THERAPEUTIC EXERCISES: CPT | Mod: CQ

## 2022-05-17 PROCEDURE — 99232 SBSQ HOSP IP/OBS MODERATE 35: CPT | Mod: ,,, | Performed by: INTERNAL MEDICINE

## 2022-05-17 RX ADMIN — RIFAXIMIN 550 MG: 550 TABLET ORAL at 08:05

## 2022-05-17 RX ADMIN — APIXABAN 5 MG: 5 TABLET, FILM COATED ORAL at 08:05

## 2022-05-17 RX ADMIN — MICONAZOLE NITRATE 2 % TOPICAL POWDER: at 08:05

## 2022-05-17 RX ADMIN — LACTULOSE 20 G: 20 SOLUTION ORAL at 08:05

## 2022-05-17 RX ADMIN — IPRATROPIUM BROMIDE AND ALBUTEROL SULFATE 3 ML: 2.5; .5 SOLUTION RESPIRATORY (INHALATION) at 12:05

## 2022-05-17 RX ADMIN — POLYETHYLENE GLYCOL 3350 17 G: 17 POWDER, FOR SOLUTION ORAL at 08:05

## 2022-05-17 RX ADMIN — LACTULOSE 20 G: 20 SOLUTION ORAL at 05:05

## 2022-05-17 RX ADMIN — IPRATROPIUM BROMIDE AND ALBUTEROL SULFATE 3 ML: 2.5; .5 SOLUTION RESPIRATORY (INHALATION) at 01:05

## 2022-05-17 RX ADMIN — MIDODRINE HYDROCHLORIDE 5 MG: 5 TABLET ORAL at 12:05

## 2022-05-17 RX ADMIN — METOPROLOL TARTRATE 25 MG: 25 TABLET, FILM COATED ORAL at 08:05

## 2022-05-17 RX ADMIN — MIDODRINE HYDROCHLORIDE 5 MG: 5 TABLET ORAL at 05:05

## 2022-05-17 RX ADMIN — BUPROPION HYDROCHLORIDE 75 MG: 75 TABLET, FILM COATED ORAL at 08:05

## 2022-05-17 RX ADMIN — BUMETANIDE 3 MG: 1 TABLET ORAL at 08:05

## 2022-05-17 RX ADMIN — IPRATROPIUM BROMIDE AND ALBUTEROL SULFATE 3 ML: 2.5; .5 SOLUTION RESPIRATORY (INHALATION) at 07:05

## 2022-05-17 RX ADMIN — AMIODARONE HYDROCHLORIDE 200 MG: 200 TABLET ORAL at 08:05

## 2022-05-17 RX ADMIN — FAMOTIDINE 20 MG: 20 TABLET ORAL at 08:05

## 2022-05-17 RX ADMIN — MIDODRINE HYDROCHLORIDE 5 MG: 5 TABLET ORAL at 08:05

## 2022-05-17 RX ADMIN — METOLAZONE 5 MG: 5 TABLET ORAL at 08:05

## 2022-05-17 RX ADMIN — QUETIAPINE 50 MG: 25 TABLET ORAL at 08:05

## 2022-05-17 RX ADMIN — BUMETANIDE 3 MG: 1 TABLET ORAL at 05:05

## 2022-05-17 RX ADMIN — LACTULOSE 20 G: 20 SOLUTION ORAL at 12:05

## 2022-05-17 NOTE — PROGRESS NOTES
[facility]  Nephrology  Progress Note    Patient Name: Modesto Patrick  MRN: [id number]  Admission Date: 5/9/2022  Hospital Length of Stay: 8 days  Attending Provider: Leah Lora,*   Primary Care Physician: Primary Doctor No  Principal Problem:Biventricular congestive heart failure    Subjective:     HPI: I have been following Mr. Patrick during his hospitalization at [facility].  He has biventricular heart failure, AFib, cirrhosis, and acute on chronic renal failure.  He has significant lower extremity and scrotal edema.  Albumin is low.  He has been receiving IV albumin and IV Bumex for the past 3 days.  Creatinine has risen from 2.8 to 3.7 during that time.      Interval History:  No SOB at rest.  No new symptoms.    Review of patient's allergies indicates:   Allergen Reactions    Lasix [furosemide]      Current Facility-Administered Medications   Medication Frequency    acetaminophen tablet 650 mg Q4H PRN    albuterol-ipratropium 2.5 mg-0.5 mg/3 mL nebulizer solution 3 mL Q6H    amiodarone tablet 200 mg Daily    apixaban tablet 5 mg BID    bisacodyL suppository 10 mg Daily PRN    bumetanide tablet 3 mg BID loop    buPROPion tablet 75 mg BID    calamine-zinc oxide 8-8% solution QID PRN    famotidine tablet 20 mg Daily    hydrALAZINE injection 10 mg Q6H PRN    lactulose 20 gram/30 mL solution Soln 20 g QID    melatonin tablet 6 mg Nightly PRN    metOLazone tablet 5 mg Daily    metoprolol tartrate (LOPRESSOR) tablet 25 mg BID    miconazole NITRATE 2 % top powder BID    midodrine tablet 5 mg TID WM    ondansetron tablet 8 mg Q8H PRN    polyethylene glycol packet 17 g Daily    polyvinyl alcohol (artificial tears) 1.4 % ophthalmic solution 1 drop PRN    prochlorperazine injection Soln 5 mg Q6H PRN    QUEtiapine tablet 50 mg BID    rifAXIMin tablet 550 mg BID    silver sulfADIAZINE 1% cream PRN       Objective:     Vital Signs (Most Recent):  Temp: 97.5 °F  (36.4 °C) (05/17/22 0730)  Pulse: 61 (05/17/22 0800)  Resp: (!) 25 (05/17/22 0800)  BP: 115/66 (05/17/22 0800)  SpO2: 100 % (05/17/22 0800)  O2 Device (Oxygen Therapy): nasal cannula (05/17/22 0140)   Vital Signs (24h Range):  Temp:  [97.5 °F (36.4 °C)-98.7 °F (37.1 °C)] 97.5 °F (36.4 °C)  Pulse:  [] 61  Resp:  [13-29] 25  SpO2:  [77 %-100 %] 100 %  BP: ()/(22-77) 115/66     Weight: 115.3 kg (254 lb 3.1 oz) (05/17/22 0916)  Body mass index is 31.77 kg/m².  Body surface area is 2.47 meters squared.    I/O last 3 completed shifts:  In: 2397 [P.O.:2397]  Out: 8680 [Urine:8680]    Physical Exam  Eyes:      Pupils: Pupils are equal, round, and reactive to light.   Cardiovascular:      Rate and Rhythm: Tachycardia present. Rhythm irregular.   Pulmonary:      Breath sounds: No wheezing or rales.   Abdominal:      Tenderness: There is no abdominal tenderness.   Genitourinary:     Comments: Scrotal swelling  Musculoskeletal:      Cervical back: Neck supple.      Right lower leg: Edema present.      Left lower leg: Edema present.   Neurological:      Mental Status: He is alert.       Significant Labs:  BMP:   Recent Labs   Lab 05/17/22  0339   GLU 93      K 4.0   CL 88*   CO2 50*   BUN 75*   CREATININE 2.55*   CALCIUM 8.8        Significant Imaging:      Assessment/Plan:     * Acute on chronic biventricular congestive heart failure    R and L ventricular failure with cirrhosis.      Renal failure (ARF), acute on chronic  Renal function has improved due to improved perfusion while on dopamine.  He is diuresing at a brisk rate.  He has contraction alkalosis    HTN (hypertension)  BP borderline low    A-fib  Chronic Afib.  Paced rhythm    CKD (chronic kidney disease)  CKD 3        Thank you for your consult.     Jaiden Grant MD  Nephrology  [facility]

## 2022-05-17 NOTE — SUBJECTIVE & OBJECTIVE
Interval History: Pt has diuresed well. Symptoms have improved. Family has decided to have pt discharge home on hospice care.    Review of Systems   Constitutional: Negative for diaphoresis.   Cardiovascular:  Positive for leg swelling. Negative for chest pain, dyspnea on exertion, orthopnea and syncope.   Respiratory:  Negative for shortness of breath.    Gastrointestinal:  Negative for nausea and vomiting.   Objective:     Vital Signs (Most Recent):  Temp: 98.6 °F (37 °C) (05/16/22 1500)  Pulse: (!) 114 (05/16/22 1845)  Resp: (!) 24 (05/16/22 1845)  BP: (!) 90/56 (05/16/22 1845)  SpO2: (!) 78 % (05/16/22 1845)   Vital Signs (24h Range):  Temp:  [97.8 °F (36.6 °C)-98.7 °F (37.1 °C)] 98.6 °F (37 °C)  Pulse:  [] 114  Resp:  [15-30] 24  SpO2:  [77 %-100 %] 78 %  BP: ()/(22-82) 90/56     Weight: 115.3 kg (254 lb 3.1 oz)  Body mass index is 31.77 kg/m².     SpO2: (!) 78 %  O2 Device (Oxygen Therapy): nasal cannula      Intake/Output Summary (Last 24 hours) at 5/16/2022 2045  Last data filed at 5/16/2022 1800  Gross per 24 hour   Intake 1917 ml   Output 4480 ml   Net -2563 ml       Lines/Drains/Airways       Peripherally Inserted Central Catheter Line  Duration             PICC Double Lumen 05/12/22 1520 left basilic 4 days              Peripheral Intravenous Line  Duration                  Peripheral IV - Single Lumen 22 G Posterior;Right Wrist -- days                    Physical Exam  Constitutional:       General: He is not in acute distress.     Appearance: He is not ill-appearing.   Cardiovascular:      Rate and Rhythm: Normal rate. Rhythm irregular.      Pulses: Normal pulses.      Heart sounds: Normal heart sounds.   Pulmonary:      Effort: Pulmonary effort is normal.      Breath sounds: Normal breath sounds.   Musculoskeletal:      Comments: Anasarca   Neurological:      Mental Status: He is alert. Mental status is at baseline.       Significant Labs: All pertinent lab results from the last 24 hours  have been reviewed. and   Recent Lab Results         05/16/22  0600        Albumin/Globulin Ratio 1.1       Albumin 3.2       Alkaline Phosphatase 91       ALT 18       Anion Gap 12       AST 18       BILIRUBIN TOTAL 0.5       BUN 71       BUN/CREAT RATIO 29       Calcium 9.3       Chloride 95       CO2 41       Creatinine 2.42       eGFR if non African American 29       Globulin, Total 2.8       Glucose 130       Potassium 4.3       PROTEIN TOTAL 6.0       Sodium 144               Significant Imaging: Echocardiogram: Transthoracic echo (TTE) complete (Cupid Only):   Results for orders placed or performed during the hospital encounter of 05/09/22   Echo   Result Value Ref Range    BSA 2.57 m2    Right Atrial Pressure (from IVC) 15 mmHg    EF 20 %    Left Ventricular Outflow Tract Mean Gradient 0.30 mmHg    AORTIC VALVE CUSP SEPERATION 15.023906401915735 cm    LVIDd 5.90 3.5 - 6.0 cm    IVS 1.22 (A) 0.6 - 1.1 cm    Posterior Wall 1.27 (A) 0.6 - 1.1 cm    Ao root annulus 3.40 cm    LVIDs 5.40 (A) 2.1 - 4.0 cm    FS 8 28 - 44 %    IVC ostium 3.0 cm    LV mass 321.10 g    LA size 4.80 cm    RVDD 5.10 cm    TAPSE 1.90 cm    Left Ventricle Relative Wall Thickness 0.43 cm    AV mean gradient 9 mmHg    AV valve area 0.47 cm2    AV Velocity Ratio 0.20     AV index (prosthetic) 0.14     MV mean gradient 17 mmHg    MV valve area p 1/2 method 4.40 cm2    MV valve area by continuity eq 0.30 cm2    E wave deceleration time 179 msec    LVOT diameter 2.10 cm    LVOT area 3.5 cm2    LVOT peak walker 0.4 m/s    LVOT peak VTI 5.00 cm    Ao peak walker 2.0 m/s    Ao VTI 37.00 cm    LVOT stroke volume 17.31 cm3    AV peak gradient 16 mmHg    MV peak gradient 28 mmHg    TV rest pulmonary artery pressure 40 mmHg    MV Peak E Walker 1.80 m/s    TR Max Walker 2.50 m/s    MV VTI 57.0 cm    MV stenosis pressure 1/2 time 50 ms    LV Systolic Volume 141.30 mL    LV Systolic Volume Index 56.3 mL/m2    LV Diastolic Volume 173.20 mL    LV Diastolic Volume  Index 69.00 mL/m2    LV Mass Index 128 g/m2    Echo EF Estimated 25 %    RA Major Axis 5.00 cm    Triscuspid Valve Regurgitation Peak Gradient 25 mmHg    Narrative    · The left ventricle is moderately enlarged with mild concentric   hypertrophy and severely decreased systolic function.  · The estimated ejection fraction is 20%.  · There is left ventricular global hypokinesis.  · Moderate right ventricular enlargement with severely reduced right   ventricular systolic function.  · Moderate right atrial enlargement.  · Moderate left atrial enlargement.  · Moderate mitral regurgitation.  · Severe tricuspid regurgitation.  · Moderate to severe pulmonic regurgitation.  · There is a bioprosthetic aortic valve present. There is no aortic   insufficiency present. Prosthetic aortic valve is normal.  · The aortic valve mean gradient is 9 mmHg with a dimensionless index of   0.14.  · Elevated central venous pressure (15 mmHg).  · The estimated PA systolic pressure is 40 mmHg.  · There are bilateral pleural effusions.

## 2022-05-17 NOTE — PROGRESS NOTES
[facility]  Nephrology  Progress Note    Patient Name: Modesto Patrick  MRN: [id number]  Admission Date: 5/9/2022  Hospital Length of Stay: 7 days  Attending Provider: Leah Lora,*   Primary Care Physician: Primary Doctor No  Principal Problem:Biventricular congestive heart failure    Subjective:     HPI: I have been following Mr. Patrick during his hospitalization at [facility].  He has biventricular heart failure, AFib, cirrhosis, and acute on chronic renal failure.  He has significant lower extremity and scrotal edema.  Albumin is low.  He has been receiving IV albumin and IV Bumex for the past 3 days.  Creatinine has risen from 2.8 to 3.7 during that time.      Interval History:  He is up in the chair today.  Less SOB.    Review of patient's allergies indicates:   Allergen Reactions    Lasix [furosemide]      Current Facility-Administered Medications   Medication Frequency    acetaminophen tablet 650 mg Q4H PRN    albuterol-ipratropium 2.5 mg-0.5 mg/3 mL nebulizer solution 3 mL Q6H    amiodarone tablet 200 mg Daily    apixaban tablet 5 mg BID    bisacodyL suppository 10 mg Daily PRN    bumetanide tablet 3 mg BID loop    buPROPion tablet 75 mg BID    calamine-zinc oxide 8-8% solution QID PRN    famotidine tablet 20 mg Daily    hydrALAZINE injection 10 mg Q6H PRN    lactulose 20 gram/30 mL solution Soln 20 g QID    melatonin tablet 6 mg Nightly PRN    [START ON 5/17/2022] metOLazone tablet 5 mg Daily    metoprolol tartrate (LOPRESSOR) tablet 25 mg BID    miconazole NITRATE 2 % top powder BID    midodrine tablet 5 mg TID WM    ondansetron tablet 8 mg Q8H PRN    polyethylene glycol packet 17 g Daily    polyvinyl alcohol (artificial tears) 1.4 % ophthalmic solution 1 drop PRN    prochlorperazine injection Soln 5 mg Q6H PRN    QUEtiapine tablet 50 mg BID    rifAXIMin tablet 550 mg BID    silver sulfADIAZINE 1% cream PRN       Objective:     Vital Signs (Most  Recent):  Temp: 98.6 °F (37 °C) (05/16/22 1500)  Pulse: 110 (05/16/22 2119)  Resp: (!) 25 (05/16/22 2100)  BP: 105/67 (05/16/22 2119)  SpO2: (!) 78 % (05/16/22 1845)  O2 Device (Oxygen Therapy): nasal cannula (05/16/22 2100)   Vital Signs (24h Range):  Temp:  [97.8 °F (36.6 °C)-98.7 °F (37.1 °C)] 98.6 °F (37 °C)  Pulse:  [] 110  Resp:  [15-30] 25  SpO2:  [77 %-100 %] 78 %  BP: ()/(22-82) 105/67     Weight: 115.3 kg (254 lb 3.1 oz) (05/14/22 0600)  Body mass index is 31.77 kg/m².  Body surface area is 2.47 meters squared.    I/O last 3 completed shifts:  In: 2157 [P.O.:2157]  Out: 10458 [Urine:99817]    Physical Exam  Eyes:      Pupils: Pupils are equal, round, and reactive to light.   Cardiovascular:      Rate and Rhythm: Tachycardia present. Rhythm irregular.   Pulmonary:      Breath sounds: No wheezing or rales.   Abdominal:      Tenderness: There is no abdominal tenderness.   Genitourinary:     Comments: Scrotal swelling  Musculoskeletal:      Cervical back: Neck supple.      Right lower leg: Edema present.      Left lower leg: Edema present.   Neurological:      Mental Status: He is alert.       Significant Labs:  BMP:   Recent Labs   Lab 05/16/22  0600   *      K 4.3   CL 95*   CO2 41*   BUN 71*   CREATININE 2.42*   CALCIUM 9.3        Significant Imaging:      Assessment/Plan:     * Acute on chronic biventricular congestive heart failure    R and L ventricular failure with cirrhosis.      Renal failure (ARF), acute on chronic  Renal function has improved due to improved perfusion while on dopamine.  He is diuresing at a brisk rate.    Cirrhosis  Secondary to right heart failure and previous ethanol use    HTN (hypertension)  BP borderline low    A-fib  Chronic Afib.  Rate is controlled with amiodarone and digoxin.    CKD (chronic kidney disease)  CKD 3        Thank you for your consult.     Jaiden Grant MD  Nephrology  [facility]

## 2022-05-17 NOTE — PROGRESS NOTES
[facility] Medicine  Progress Note    Patient Name: Modesto Patrick  MRN: [id number]  Patient Class: IP- Inpatient   Admission Date: 5/9/2022  Length of Stay: 8 days  Attending Physician: Leah Lora,*  Primary Care Provider: Primary Doctor No        Subjective:     Principal Problem:Biventricular congestive heart failure        HPI:  The patient is a 60yo AA male with a MedHX of CHF, paroxysmal afib, chronic anticoagulation of eliquis, HTN, HLD, AAA repair, HLD, COPD on home oxygen, CKD stage III, cirrhosis and GERD who presents to LTAC for long-term care of his CHF exacerbation and acute on chronic renal failure. The patient had originally been admitted to [facility] for CHF exacerbation on 04/27, where he was also found to have afib with RVR and heme positive stools. His Eliquis was held and GI saw him and did not recommend any inpatient GI workup. He was started back on his Eliquis and IV diuresis with bumex. This was stopped after his creatinine was found to be elevated above baseline. He was started back on diuretics after his volume status was found to still be decompensated. The patient's home metoprolol dose was also increased while at [facility], and his home losartan, was held.    The patient was found to be stable at [facility] and was accepted to LTAC for further monitoring/care by Dr. Solis for his acute on chronic CHF exacerbation.      Overview/Hospital Course:  05/11 Awake and resting in bed. Having sob. Edematous.  Sat is 96%. Vitals signs stable. Recheck potassium level today.  05/12 Mr. Patrick had afib with RVR and HF on 05/11. Required IV amiodarone and dopamine. Heart rate this am is in 70s - will dc amio drip and transition to amiodarone po. States he is feeling about the same.      Interval History:  Patient doing slightly better today per his nurse, was able to transfer from bed to chair standing on his feet with assistance,  able to feed himself today.  Keen catheter got removed yesterday and he was able to call for the bed pan.  Feeling better because his constipation has been resolved with a tommy citrate yesterday.    Review of Systems   Constitutional:  Negative for appetite change, chills and fever.   Respiratory:  Negative for cough, shortness of breath and wheezing.    Cardiovascular:  Positive for leg swelling (improving). Negative for chest pain.   Gastrointestinal:  Negative for abdominal pain, constipation (resolved since yesterday), diarrhea, nausea and vomiting.   Genitourinary:  Negative for difficulty urinating and hematuria.   Skin:  Negative for rash and wound.   Objective:     Vital Signs (Most Recent):  Temp: 98.2 °F (36.8 °C) (05/17/22 1100)  Pulse: 64 (05/17/22 1258)  Resp: 13 (05/17/22 1258)  BP: (!) 91/55 (05/17/22 1200)  SpO2: 99 % (05/17/22 1258)   Vital Signs (24h Range):  Temp:  [97.5 °F (36.4 °C)-98.7 °F (37.1 °C)] 98.2 °F (36.8 °C)  Pulse:  [] 64  Resp:  [11-29] 13  SpO2:  [78 %-100 %] 99 %  BP: ()/(48-77) 91/55     Weight: 115.3 kg (254 lb 3.1 oz)  Body mass index is 31.77 kg/m².    Intake/Output Summary (Last 24 hours) at 5/17/2022 1416  Last data filed at 5/17/2022 1300  Gross per 24 hour   Intake 2160 ml   Output 4580 ml   Net -2420 ml        Physical Exam  Constitutional:       General: He is not in acute distress.     Appearance: He is obese. He is not toxic-appearing.   HENT:      Mouth/Throat:      Mouth: Mucous membranes are moist.      Pharynx: Oropharynx is clear. No oropharyngeal exudate.   Eyes:      General: No scleral icterus.        Right eye: No discharge.         Left eye: No discharge.   Cardiovascular:      Rate and Rhythm: Regular rhythm.      Heart sounds: Murmur heard.   Pulmonary:      Effort: No respiratory distress.      Breath sounds: No wheezing, rhonchi or rales.   Abdominal:      General: Bowel sounds are normal. There is no distension.      Palpations: There is no  mass.      Tenderness: There is no abdominal tenderness. There is no guarding or rebound.   Genitourinary:     Rectum: Guaiac stool: Clear urine from Keen catheter.   Musculoskeletal:         General: Swelling (improving) present.      Right lower leg: Edema present.      Left lower leg: Edema present.   Skin:     Coloration: Skin is not jaundiced.      Findings: No rash.   Neurological:      Mental Status: He is alert.       Significant Labs: All pertinent labs within the past 24 hours have been reviewed.  BMP:   Recent Labs   Lab 05/17/22  0339   GLU 93      K 4.0   CL 88*   CO2 50*   BUN 75*   CREATININE 2.55*   CALCIUM 8.8       CBC:   Recent Labs   Lab 05/17/22 0339   WBC 6.66   HGB 9.4*   HCT 29.9*   *     CMP:   Recent Labs   Lab 05/16/22  0600 05/17/22  0339    137   K 4.3 4.0   CL 95* 88*   CO2 41* 50*   * 93   BUN 71* 75*   CREATININE 2.42* 2.55*   CALCIUM 9.3 8.8   PROT 6.0* 6.0*   ALBUMIN 3.2* 3.1*   BILITOT 0.5 0.7   ALKPHOS 91 91   AST 18 17   ALT 18 17   ANIONGAP 12 3*   EGFRNONAA 29* 27*         Significant Imaging: I have reviewed all pertinent imaging results/findings within the past 24 hours.      Assessment/Plan:      * Acute on chronic biventricular congestive heart failure  - Continuous oxygen  - Continue bumetinide 1mg IV bid  - duonebs  - Cardiology consult  - ECHO pending  - telemetry  - continue metoprolol 50mg bid   - daily weights  - daily I+O  - low sodium diet  - dietary consult      05/11 sob this morning  02 per nc   Cardiology consulted       05/12 continue diuresis   Continue dopamine drip    5/16 met this morning on oral and IV infusion of amiodarone which was added yesterday due to AFib RVR.  Patient being diuresed.  Feeling better overall.  Will get Cardiology input on his medications - need an oral regimen for discharge home.  Discussed with his sister Eber with open have the patient back home to his end of this week on hospice.  He does not wear  Keen catheter at home and therefore we will get this discontinued today.  Case discussed with  for her to get discharge plan in progress.    5/17 case was discussed with cardiologist Dr. Adame yesterday and the patient's overall prognosis is poor therefore he agrees with plan for hospice.  Patient transition to all oral medications and I discussed the case with  today who has set up home hospice and so patient will be discharged tomorrow.    Constipation  5/17 resolved today after tommy citrate yesterday.  He is on maintenance MiraLax.      GERD (gastroesophageal reflux disease)  - famotidine injection 20mg bID      Multiple wounds of skin  - has bilateral chronic lower extremity skin changes/wounds  - wound care consult       Depression  - continue buproprion      Cirrhosis  - continue rifaximin 550mg bid  - continue lactulose 20mg qid      HTN (hypertension)  - continue metoprolol 50mg bid  -continue lopressor          A-fib  - continue eliquis 2.5mg bid  - continue amiodarone 200mg bid  - telemetry      05/11  - on amiodarone and lopressor   -continue eliquis    05/12 pulse in 70s   Dc amiodarone drip and transition to po amiodarone    5/16 oral amiodarone discontinued due to the fact that he is on amiodarone infusion at this moment.  Await Cardiology input.    CKD (chronic kidney disease)  - bmp pending  - records say creatinine at baseline at 3.3 today at [facility]    5/16 creatinine overall improved.  Continue monitoring.      COPD (chronic obstructive pulmonary disease)  - continual oxygen via [place]  - duonebs q6        VTE Risk Mitigation (From admission, onward)         Ordered     apixaban tablet 5 mg  2 times daily         05/12/22 0925     IP VTE HIGH RISK PATIENT  Once         05/09/22 1736     Place sequential compression device  Until discontinued         05/09/22 1736                Discharge Planning   DIONY:      Code Status: DNR   Is the patient medically ready for discharge?:      Reason for patient still in hospital (select all that apply): Treatment  Discharge Plan A: Home with family, Home Health                  Leah Lora MD  Department of Hospital Medicine   [facility] \[facility]\""

## 2022-05-17 NOTE — ASSESSMENT & PLAN NOTE
- afib with RVR - -130s  - continue amiodarone gtt  - give one time dose of IV digoxin 500mcg now  - continue Eliquis    5/12/2022:  - HR now controlled, IV amiodarone has been transitioned to PO  - Continue Eliquis    5/13/22:  - rate is controlled  - continue PO amiodarone  - continue Eliquis    5/16/22:  - discontinue IV amiodarone  - amiodarone 200mg po daily  - continue Eliquis  - pt discharging home on hospice care, cardiology will sign off

## 2022-05-17 NOTE — SUBJECTIVE & OBJECTIVE
Interval History:  No SOB at rest.  No new symptoms.    Review of patient's allergies indicates:   Allergen Reactions    Lasix [furosemide]      Current Facility-Administered Medications   Medication Frequency    acetaminophen tablet 650 mg Q4H PRN    albuterol-ipratropium 2.5 mg-0.5 mg/3 mL nebulizer solution 3 mL Q6H    amiodarone tablet 200 mg Daily    apixaban tablet 5 mg BID    bisacodyL suppository 10 mg Daily PRN    bumetanide tablet 3 mg BID loop    buPROPion tablet 75 mg BID    calamine-zinc oxide 8-8% solution QID PRN    famotidine tablet 20 mg Daily    hydrALAZINE injection 10 mg Q6H PRN    lactulose 20 gram/30 mL solution Soln 20 g QID    melatonin tablet 6 mg Nightly PRN    metOLazone tablet 5 mg Daily    metoprolol tartrate (LOPRESSOR) tablet 25 mg BID    miconazole NITRATE 2 % top powder BID    midodrine tablet 5 mg TID WM    ondansetron tablet 8 mg Q8H PRN    polyethylene glycol packet 17 g Daily    polyvinyl alcohol (artificial tears) 1.4 % ophthalmic solution 1 drop PRN    prochlorperazine injection Soln 5 mg Q6H PRN    QUEtiapine tablet 50 mg BID    rifAXIMin tablet 550 mg BID    silver sulfADIAZINE 1% cream PRN       Objective:     Vital Signs (Most Recent):  Temp: 97.5 °F (36.4 °C) (05/17/22 0730)  Pulse: 61 (05/17/22 0800)  Resp: (!) 25 (05/17/22 0800)  BP: 115/66 (05/17/22 0800)  SpO2: 100 % (05/17/22 0800)  O2 Device (Oxygen Therapy): nasal cannula (05/17/22 0140)   Vital Signs (24h Range):  Temp:  [97.5 °F (36.4 °C)-98.7 °F (37.1 °C)] 97.5 °F (36.4 °C)  Pulse:  [] 61  Resp:  [13-29] 25  SpO2:  [77 %-100 %] 100 %  BP: ()/(22-77) 115/66     Weight: 115.3 kg (254 lb 3.1 oz) (05/17/22 0916)  Body mass index is 31.77 kg/m².  Body surface area is 2.47 meters squared.    I/O last 3 completed shifts:  In: 2397 [P.O.:2397]  Out: 8680 [Urine:8680]    Physical Exam  Eyes:      Pupils: Pupils are equal, round, and reactive to light.   Cardiovascular:      Rate and Rhythm: Tachycardia  present. Rhythm irregular.   Pulmonary:      Breath sounds: No wheezing or rales.   Abdominal:      Tenderness: There is no abdominal tenderness.   Genitourinary:     Comments: Scrotal swelling  Musculoskeletal:      Cervical back: Neck supple.      Right lower leg: Edema present.      Left lower leg: Edema present.   Neurological:      Mental Status: He is alert.       Significant Labs:  BMP:   Recent Labs   Lab 05/17/22  0339   GLU 93      K 4.0   CL 88*   CO2 50*   BUN 75*   CREATININE 2.55*   CALCIUM 8.8        Significant Imaging:

## 2022-05-17 NOTE — SUBJECTIVE & OBJECTIVE
Interval History:  Patient doing slightly better today per his nurse, was able to transfer from bed to chair standing on his feet with assistance, able to feed himself today.  Keen catheter got removed yesterday and he was able to call for the bed pan.  Feeling better because his constipation has been resolved with a tommy citrate yesterday.    Review of Systems   Constitutional:  Negative for appetite change, chills and fever.   Respiratory:  Negative for cough, shortness of breath and wheezing.    Cardiovascular:  Positive for leg swelling (improving). Negative for chest pain.   Gastrointestinal:  Negative for abdominal pain, constipation (resolved since yesterday), diarrhea, nausea and vomiting.   Genitourinary:  Negative for difficulty urinating and hematuria.   Skin:  Negative for rash and wound.   Objective:     Vital Signs (Most Recent):  Temp: 98.2 °F (36.8 °C) (05/17/22 1100)  Pulse: 64 (05/17/22 1258)  Resp: 13 (05/17/22 1258)  BP: (!) 91/55 (05/17/22 1200)  SpO2: 99 % (05/17/22 1258)   Vital Signs (24h Range):  Temp:  [97.5 °F (36.4 °C)-98.7 °F (37.1 °C)] 98.2 °F (36.8 °C)  Pulse:  [] 64  Resp:  [11-29] 13  SpO2:  [78 %-100 %] 99 %  BP: ()/(48-77) 91/55     Weight: 115.3 kg (254 lb 3.1 oz)  Body mass index is 31.77 kg/m².    Intake/Output Summary (Last 24 hours) at 5/17/2022 1416  Last data filed at 5/17/2022 1300  Gross per 24 hour   Intake 2160 ml   Output 4580 ml   Net -2420 ml        Physical Exam  Constitutional:       General: He is not in acute distress.     Appearance: He is obese. He is not toxic-appearing.   HENT:      Mouth/Throat:      Mouth: Mucous membranes are moist.      Pharynx: Oropharynx is clear. No oropharyngeal exudate.   Eyes:      General: No scleral icterus.        Right eye: No discharge.         Left eye: No discharge.   Cardiovascular:      Rate and Rhythm: Regular rhythm.      Heart sounds: Murmur heard.   Pulmonary:      Effort: No respiratory distress.      Breath  sounds: No wheezing, rhonchi or rales.   Abdominal:      General: Bowel sounds are normal. There is no distension.      Palpations: There is no mass.      Tenderness: There is no abdominal tenderness. There is no guarding or rebound.   Genitourinary:     Rectum: Guaiac stool: Clear urine from Keen catheter.   Musculoskeletal:         General: Swelling (improving) present.      Right lower leg: Edema present.      Left lower leg: Edema present.   Skin:     Coloration: Skin is not jaundiced.      Findings: No rash.   Neurological:      Mental Status: He is alert.       Significant Labs: All pertinent labs within the past 24 hours have been reviewed.  BMP:   Recent Labs   Lab 05/17/22  0339   GLU 93      K 4.0   CL 88*   CO2 50*   BUN 75*   CREATININE 2.55*   CALCIUM 8.8       CBC:   Recent Labs   Lab 05/17/22  0339   WBC 6.66   HGB 9.4*   HCT 29.9*   *     CMP:   Recent Labs   Lab 05/16/22  0600 05/17/22  0339    137   K 4.3 4.0   CL 95* 88*   CO2 41* 50*   * 93   BUN 71* 75*   CREATININE 2.42* 2.55*   CALCIUM 9.3 8.8   PROT 6.0* 6.0*   ALBUMIN 3.2* 3.1*   BILITOT 0.5 0.7   ALKPHOS 91 91   AST 18 17   ALT 18 17   ANIONGAP 12 3*   EGFRNONAA 29* 27*         Significant Imaging: I have reviewed all pertinent imaging results/findings within the past 24 hours.

## 2022-05-17 NOTE — PROGRESS NOTES
Patient seen today for a follow-up. His renal labs are elevated. He has lost 10kg since admission which is desired due to CHF exacerbation upon admission. CBW is 115kg. Patients intake is % of meals. Patient receives supplements of chris and ensure enlive to increase intake for wound healing. Continue with poc. RD following.

## 2022-05-17 NOTE — PROGRESS NOTES
[facility]  Cardiology  Progress Note    Patient Name: Modesto Patrick  MRN: [id number]  Admission Date: 5/9/2022  Hospital Length of Stay: 7 days  Code Status: DNR   Attending Physician: Leah Lora,*   Primary Care Physician: Primary Doctor No  Expected Discharge Date:   Principal Problem:Biventricular congestive heart failure    Subjective:       Interval History: Pt has diuresed well. Symptoms have improved. Family has decided to have pt discharge home on hospice care.    Review of Systems   Constitutional: Negative for diaphoresis.   Cardiovascular:  Positive for leg swelling. Negative for chest pain, dyspnea on exertion, orthopnea and syncope.   Respiratory:  Negative for shortness of breath.    Gastrointestinal:  Negative for nausea and vomiting.   Objective:     Vital Signs (Most Recent):  Temp: 98.6 °F (37 °C) (05/16/22 1500)  Pulse: (!) 114 (05/16/22 1845)  Resp: (!) 24 (05/16/22 1845)  BP: (!) 90/56 (05/16/22 1845)  SpO2: (!) 78 % (05/16/22 1845)   Vital Signs (24h Range):  Temp:  [97.8 °F (36.6 °C)-98.7 °F (37.1 °C)] 98.6 °F (37 °C)  Pulse:  [] 114  Resp:  [15-30] 24  SpO2:  [77 %-100 %] 78 %  BP: ()/(22-82) 90/56     Weight: 115.3 kg (254 lb 3.1 oz)  Body mass index is 31.77 kg/m².     SpO2: (!) 78 %  O2 Device (Oxygen Therapy): nasal cannula      Intake/Output Summary (Last 24 hours) at 5/16/2022 2045  Last data filed at 5/16/2022 1800  Gross per 24 hour   Intake 1917 ml   Output 4480 ml   Net -2563 ml       Lines/Drains/Airways       Peripherally Inserted Central Catheter Line  Duration             PICC Double Lumen 05/12/22 1520 left basilic 4 days              Peripheral Intravenous Line  Duration                  Peripheral IV - Single Lumen 22 G Posterior;Right Wrist -- days                    Physical Exam  Constitutional:       General: He is not in acute distress.     Appearance: He is not ill-appearing.   Cardiovascular:      Rate and Rhythm:  Normal rate. Rhythm irregular.      Pulses: Normal pulses.      Heart sounds: Normal heart sounds.   Pulmonary:      Effort: Pulmonary effort is normal.      Breath sounds: Normal breath sounds.   Musculoskeletal:      Comments: Anasarca   Neurological:      Mental Status: He is alert. Mental status is at baseline.       Significant Labs: All pertinent lab results from the last 24 hours have been reviewed. and   Recent Lab Results         05/16/22  0600        Albumin/Globulin Ratio 1.1       Albumin 3.2       Alkaline Phosphatase 91       ALT 18       Anion Gap 12       AST 18       BILIRUBIN TOTAL 0.5       BUN 71       BUN/CREAT RATIO 29       Calcium 9.3       Chloride 95       CO2 41       Creatinine 2.42       eGFR if non African American 29       Globulin, Total 2.8       Glucose 130       Potassium 4.3       PROTEIN TOTAL 6.0       Sodium 144               Significant Imaging: Echocardiogram: Transthoracic echo (TTE) complete (Cupid Only):   Results for orders placed or performed during the hospital encounter of 05/09/22   Echo   Result Value Ref Range    BSA 2.57 m2    Right Atrial Pressure (from IVC) 15 mmHg    EF 20 %    Left Ventricular Outflow Tract Mean Gradient 0.30 mmHg    AORTIC VALVE CUSP SEPERATION [id number] cm    LVIDd 5.90 3.5 - 6.0 cm    IVS 1.22 (A) 0.6 - 1.1 cm    Posterior Wall 1.27 (A) 0.6 - 1.1 cm    Ao root annulus 3.40 cm    LVIDs 5.40 (A) 2.1 - 4.0 cm    FS 8 28 - 44 %    IVC ostium 3.0 cm    LV mass 321.10 g    LA size 4.80 cm    RVDD 5.10 cm    TAPSE 1.90 cm    Left Ventricle Relative Wall Thickness 0.43 cm    AV mean gradient 9 mmHg    AV valve area 0.47 cm2    AV Velocity Ratio 0.20     AV index (prosthetic) 0.14     MV mean gradient 17 mmHg    MV valve area p 1/2 method 4.40 cm2    MV valve area by continuity eq 0.30 cm2    E wave deceleration time 179 msec    LVOT diameter 2.10 cm    LVOT area 3.5 cm2    LVOT peak ese 0.4 m/s    LVOT peak VTI 5.00 cm    Ao peak ese 2.0  m/s    Ao VTI 37.00 cm    LVOT stroke volume 17.31 cm3    AV peak gradient 16 mmHg    MV peak gradient 28 mmHg    TV rest pulmonary artery pressure 40 mmHg    MV Peak E Walker 1.80 m/s    TR Max Walker 2.50 m/s    MV VTI 57.0 cm    MV stenosis pressure 1/2 time 50 ms    LV Systolic Volume 141.30 mL    LV Systolic Volume Index 56.3 mL/m2    LV Diastolic Volume 173.20 mL    LV Diastolic Volume Index 69.00 mL/m2    LV Mass Index 128 g/m2    Echo EF Estimated 25 %    RA Major Axis 5.00 cm    Triscuspid Valve Regurgitation Peak Gradient 25 mmHg    Narrative    · The left ventricle is moderately enlarged with mild concentric   hypertrophy and severely decreased systolic function.  · The estimated ejection fraction is 20%.  · There is left ventricular global hypokinesis.  · Moderate right ventricular enlargement with severely reduced right   ventricular systolic function.  · Moderate right atrial enlargement.  · Moderate left atrial enlargement.  · Moderate mitral regurgitation.  · Severe tricuspid regurgitation.  · Moderate to severe pulmonic regurgitation.  · There is a bioprosthetic aortic valve present. There is no aortic   insufficiency present. Prosthetic aortic valve is normal.  · The aortic valve mean gradient is 9 mmHg with a dimensionless index of   0.14.  · Elevated central venous pressure (15 mmHg).  · The estimated PA systolic pressure is 40 mmHg.  · There are bilateral pleural effusions.        Assessment and Plan:     Brief HPI: The patient is a 62yo AA male with a MedHX of CHF, bioprosthetic aortic valve, paroxysmal afib, chronic anticoagulation of eliquis, HTN, HLD, AAA repair, HLD, COPD on home oxygen, CKD stage III, cirrhosis and GERD who presents to LTAC for long-term care of his CHF exacerbation and acute on chronic renal failure. The patient had originally been admitted to [facility] for CHF exacerbation on 04/27, where he was also found to have afib with RVR and heme positive stools.  His Eliquis was held and GI saw him and did not recommend any inpatient GI workup. He was started back on his Eliquis and IV diuresis with bumex. This was stopped after his creatinine was found to be elevated above baseline. He was started back on diuretics after his volume status was found to still be decompensated. The patient's home metoprolol dose was also increased while at [facility], and his home losartan was held.     The patient was found to be stable at [facility] and was accepted to LTAC for further monitoring/care by Dr. Solis for his acute on chronic CHF exacerbation.      5/11/22:  Cardiology has been consulted for acute CHF with hx of Biventricular failure. Echo yesterday showed EF 20%, moderate RV enlargement with severely reduced RV systolic. NTpBNP is 27K. Pt is currently on Dopamine gtt. Lactate is normal. Initial troponin is normal, will trend. Pt is anasarcic. Tele monitor shows afib RVR with -130s. Amiodarone gtt has been ordered. Pt has had 600ml of urine output since this AM. Creatinine is 3.7. Potassium is 5.4.         * Acute on chronic biventricular congestive heart failure  - pt is severely decompensated  - he is allergic to lasix  - has been receiving IVP bumex 1mg bid  - unable to get bumex infusion due to national Bumex shortage  - increase IVP bumex to 2mg bid  - continue dopamine gtt  - daily weights, strict I&Os, low sodium diet    5/12/2022:  - 24 hour UOP net negative 4.5L  - Continue severe anasarca, albumin 3.2  - Continue dopamine at 2.5 and IV bumex 2mg BID  - BMP in am    5/13/22:  - net negative 4.6L of UOP  - continue dopamine gtt, dose is now at 5  - continue IV bumex 2mg bid  - continue strict I&Os, daily weights  - continue to monitor creatinine and electrolytes    5/16/22:  - pt diuresed well, he is down 21 lbs since 5/9/22  - pt is being discharged home on hospice care  - discontinue dopamine gtt  - change IV bumex to PO bumex 3mg bid  - add metolazone 5mg po  daily starting tomorrow  - continue metoprolol  - cardiology will sign off        History of aortic valve repair  - functioning properly    Per echo 5/10/22:  · There is a bioprosthetic aortic valve present. There is no aortic insufficiency present. Prosthetic aortic valve is normal.  · The aortic valve mean gradient is 9 mmHg with a dimensionless index of 0.14        Renal failure (ARF), acute on chronic  - creatinine is 3.7, was 2.8  - nephrology following    5/12/2022:  - Creatinine 3.65, continue monitoring    5/13/22:  - creatinine slightly improved today at 3.3, continue to monitor    5/16/22:  - creatinine has improved with diuresis, now 2.4    A-fib  - afib with RVR - -130s  - continue amiodarone gtt  - give one time dose of IV digoxin 500mcg now  - continue Eliquis    5/12/2022:  - HR now controlled, IV amiodarone has been transitioned to PO  - Continue Eliquis    5/13/22:  - rate is controlled  - continue PO amiodarone  - continue Eliquis    5/16/22:  - discontinue IV amiodarone  - amiodarone 200mg po daily  - continue Eliquis  - pt discharging home on hospice care, cardiology will sign off        VTE Risk Mitigation (From admission, onward)         Ordered     apixaban tablet 5 mg  2 times daily         05/12/22 0925     IP VTE HIGH RISK PATIENT  Once         05/09/22 1736     Place sequential compression device  Until discontinued         05/09/22 1736                RENEE Verde  Cardiology  [facility]

## 2022-05-17 NOTE — PLAN OF CARE
SS spoke with pt's sister, Obdulio. Pt to discharge home tomorrow with [facility]. SS notified Negin of needed DME.

## 2022-05-17 NOTE — PT/OT/SLP PROGRESS
Physical Therapy Treatment    Patient Name:  Modesto Patrick   MRN:  [id number]    Recommendations:     Discharge Recommendations:  intermediate care facility/nursing home, rehabilitation facility, nursing facility, skilled   Discharge Equipment Recommendations: other (see comments) (to be determined)   Barriers to discharge: None    Assessment:     Modesto Patrick is a 61 y.o. male admitted with a medical diagnosis of Biventricular congestive heart failure.  He presents with the following impairments/functional limitations:  weakness, impaired functional mobilty, impaired cognition, impaired self care skills, impaired endurance.    Pt with good participation able to ambulate short distance and expressing satisfaction with same    Rehab Prognosis: Good; patient would benefit from acute skilled PT services to address these deficits and reach maximum level of function.    Recent Surgery: * No surgery found *      Plan:     During this hospitalization, patient to be seen 5 x/week to address the identified rehab impairments via gait training, therapeutic activities, therapeutic exercises and progress toward the following goals:    · Plan of Care Expires:  05/17/22    Subjective     Chief Complaint: congestive heart failure  Patient/Family Comments/goals: pt alert and agreeable  Pain/Comfort:         Objective:     Communicated with Janet Willard RN prior to session.  Patient found HOB elevated with blood pressure cuff, oxygen, telemetry upon PT entry to room.     General Precautions: Standard, fall   Orthopedic Precautions:N/A   Braces:    Respiratory Status: Nasal cannula, flow 2 L/min     Functional Mobility:  · Bed Mobility:     · Supine to Sit: independence  · Transfers:     · Sit to Stand:  independence with rolling walker  · Gait: 36' x 2 trials contact guard assist with RW; slow connor,short step length, seated rest break      AM-PAC 6 CLICK MOBILITY  Turning over in bed (including adjusting bedclothes,  sheets and blankets)?: 4  Sitting down on and standing up from a chair with arms (e.g., wheelchair, bedside commode, etc.): 4  Moving from lying on back to sitting on the side of the bed?: 4  Moving to and from a bed to a chair (including a wheelchair)?: 4  Need to walk in hospital room?: 4  Climbing 3-5 steps with a railing?: 1  Basic Mobility Total Score: 21       Therapeutic Activities and Exercises:   Pt performed 30 reps (B) LE exercises: ap, quad set, glut set, straight leg raise, hip ab/adduction, long arc quad, heel slide with active assist     Patient left up in chair with all lines intact and call button in reach..    GOALS:   Multidisciplinary Problems     Physical Therapy Goals        Problem: Physical Therapy    Goal Priority Disciplines Outcome Goal Variances Interventions   Physical Therapy Goal     PT, PT/OT Ongoing, Progressing     Description: Short term goals to be met by 5/24/22:  1. Supine to sit with MInimal Assistance  2. Sit to stand transfer with Moderate Assistance  3. Bed to chair transfer with Moderate Assistance using Rolling Walker    Pt to be seen for 1 week trial to assess functional potential                      Time Tracking:     PT Received On: 05/17/22  PT Start Time: 1319     PT Stop Time: 1359  PT Total Time (min): 40 min     Billable Minutes: Gait Training 10 and Therapeutic Exercise 15    Treatment Type: Treatment  PT/PTA: PTA     PTA Visit Number: 4     05/17/2022

## 2022-05-17 NOTE — ASSESSMENT & PLAN NOTE
- pt is severely decompensated  - he is allergic to lasix  - has been receiving IVP bumex 1mg bid  - unable to get bumex infusion due to national Bumex shortage  - increase IVP bumex to 2mg bid  - continue dopamine gtt  - daily weights, strict I&Os, low sodium diet    5/12/2022:  - 24 hour UOP net negative 4.5L  - Continue severe anasarca, albumin 3.2  - Continue dopamine at 2.5 and IV bumex 2mg BID  - BMP in am    5/13/22:  - net negative 4.6L of UOP  - continue dopamine gtt, dose is now at 5  - continue IV bumex 2mg bid  - continue strict I&Os, daily weights  - continue to monitor creatinine and electrolytes    5/16/22:  - pt diuresed well, he is down 21 lbs since 5/9/22  - pt is being discharged home on hospice care  - discontinue dopamine gtt  - change IV bumex to PO bumex 3mg bid  - add metolazone 5mg po daily starting tomorrow  - continue metoprolol  - cardiology will sign off

## 2022-05-17 NOTE — PLAN OF CARE
Problem: Adult Inpatient Plan of Care  Goal: Plan of Care Review  5/17/2022 0739 by Myrna Navarro CRTT  Outcome: Ongoing, Progressing  5/16/2022 2116 by Myrna Navarro CRTT  Outcome: Ongoing, Progressing  Goal: Patient-Specific Goal (Individualized)  Outcome: Ongoing, Progressing  Goal: Absence of Hospital-Acquired Illness or Injury  Outcome: Ongoing, Progressing  Goal: Optimal Comfort and Wellbeing  Outcome: Ongoing, Progressing  Goal: Readiness for Transition of Care  Outcome: Ongoing, Progressing     Problem: Skin Injury Risk Increased  Goal: Skin Health and Integrity  Outcome: Ongoing, Progressing     Problem: Gas Exchange Impaired  Goal: Optimal Gas Exchange  5/17/2022 0739 by Myrna Navarro CRTT  Outcome: Ongoing, Progressing  5/16/2022 2116 by Myrna Navarro CRTT  Outcome: Ongoing, Progressing     Problem: Fluid and Electrolyte Imbalance (Acute Kidney Injury/Impairment)  Goal: Fluid and Electrolyte Balance  Outcome: Ongoing, Progressing     Problem: Oral Intake Inadequate (Acute Kidney Injury/Impairment)  Goal: Optimal Nutrition Intake  Outcome: Ongoing, Progressing     Problem: Renal Function Impairment (Acute Kidney Injury/Impairment)  Goal: Effective Renal Function  Outcome: Ongoing, Progressing     Problem: Impaired Wound Healing  Goal: Optimal Wound Healing  Outcome: Ongoing, Progressing     Problem: Infection  Goal: Absence of Infection Signs and Symptoms  Outcome: Ongoing, Progressing

## 2022-05-17 NOTE — SUBJECTIVE & OBJECTIVE
Interval History:  He is up in the chair today.  Less SOB.    Review of patient's allergies indicates:   Allergen Reactions    Lasix [furosemide]      Current Facility-Administered Medications   Medication Frequency    acetaminophen tablet 650 mg Q4H PRN    albuterol-ipratropium 2.5 mg-0.5 mg/3 mL nebulizer solution 3 mL Q6H    amiodarone tablet 200 mg Daily    apixaban tablet 5 mg BID    bisacodyL suppository 10 mg Daily PRN    bumetanide tablet 3 mg BID loop    buPROPion tablet 75 mg BID    calamine-zinc oxide 8-8% solution QID PRN    famotidine tablet 20 mg Daily    hydrALAZINE injection 10 mg Q6H PRN    lactulose 20 gram/30 mL solution Soln 20 g QID    melatonin tablet 6 mg Nightly PRN    [START ON 5/17/2022] metOLazone tablet 5 mg Daily    metoprolol tartrate (LOPRESSOR) tablet 25 mg BID    miconazole NITRATE 2 % top powder BID    midodrine tablet 5 mg TID WM    ondansetron tablet 8 mg Q8H PRN    polyethylene glycol packet 17 g Daily    polyvinyl alcohol (artificial tears) 1.4 % ophthalmic solution 1 drop PRN    prochlorperazine injection Soln 5 mg Q6H PRN    QUEtiapine tablet 50 mg BID    rifAXIMin tablet 550 mg BID    silver sulfADIAZINE 1% cream PRN       Objective:     Vital Signs (Most Recent):  Temp: 98.6 °F (37 °C) (05/16/22 1500)  Pulse: 110 (05/16/22 2119)  Resp: (!) 25 (05/16/22 2100)  BP: 105/67 (05/16/22 2119)  SpO2: (!) 78 % (05/16/22 1845)  O2 Device (Oxygen Therapy): nasal cannula (05/16/22 2100)   Vital Signs (24h Range):  Temp:  [97.8 °F (36.6 °C)-98.7 °F (37.1 °C)] 98.6 °F (37 °C)  Pulse:  [] 110  Resp:  [15-30] 25  SpO2:  [77 %-100 %] 78 %  BP: ()/(22-82) 105/67     Weight: 115.3 kg (254 lb 3.1 oz) (05/14/22 0600)  Body mass index is 31.77 kg/m².  Body surface area is 2.47 meters squared.    I/O last 3 completed shifts:  In: 2157 [P.O.:2157]  Out: 51343 [Urine:10305]    Physical Exam  Eyes:      Pupils: Pupils are equal, round, and reactive to light.   Cardiovascular:      Rate  and Rhythm: Tachycardia present. Rhythm irregular.   Pulmonary:      Breath sounds: No wheezing or rales.   Abdominal:      Tenderness: There is no abdominal tenderness.   Genitourinary:     Comments: Scrotal swelling  Musculoskeletal:      Cervical back: Neck supple.      Right lower leg: Edema present.      Left lower leg: Edema present.   Neurological:      Mental Status: He is alert.       Significant Labs:  BMP:   Recent Labs   Lab 05/16/22  0600   *      K 4.3   CL 95*   CO2 41*   BUN 71*   CREATININE 2.42*   CALCIUM 9.3        Significant Imaging:

## 2022-05-17 NOTE — PLAN OF CARE
Problem: Adult Inpatient Plan of Care  Goal: Plan of Care Review  Outcome: Ongoing, Progressing  Goal: Patient-Specific Goal (Individualized)  Outcome: Ongoing, Progressing  Goal: Absence of Hospital-Acquired Illness or Injury  Outcome: Ongoing, Progressing  Goal: Optimal Comfort and Wellbeing  Outcome: Ongoing, Progressing  Goal: Readiness for Transition of Care  Outcome: Ongoing, Progressing     Problem: Skin Injury Risk Increased  Goal: Skin Health and Integrity  Outcome: Ongoing, Progressing     Problem: Gas Exchange Impaired  Goal: Optimal Gas Exchange  Outcome: Ongoing, Progressing     Problem: Fluid and Electrolyte Imbalance (Acute Kidney Injury/Impairment)  Goal: Fluid and Electrolyte Balance  Outcome: Ongoing, Progressing     Problem: Oral Intake Inadequate (Acute Kidney Injury/Impairment)  Goal: Optimal Nutrition Intake  Outcome: Ongoing, Progressing     Problem: Renal Function Impairment (Acute Kidney Injury/Impairment)  Goal: Effective Renal Function  Outcome: Ongoing, Progressing     Problem: Impaired Wound Healing  Goal: Optimal Wound Healing  Outcome: Ongoing, Progressing     Problem: Infection  Goal: Absence of Infection Signs and Symptoms  Outcome: Ongoing, Progressing      Bro Bowling(PA)

## 2022-05-17 NOTE — PROGRESS NOTES
[facility]  Wound Care  Progress Note    Patient Name: Modesto Patrick  MRN: [id number]  Admission Date: 5/9/2022  Attending Physician: Leah Lora,*    Location of Wounds:  Wounds and edema BLE    Past Medical History:   Diagnosis Date    AAA (abdominal aortic aneurysm)     s/p repair    Anxiety disorder, unspecified     CHF (congestive heart failure)     Biventricular HF    CKD (chronic kidney disease)     COPD (chronic obstructive pulmonary disease)     Current use of long term anticoagulation     DVT (deep venous thrombosis)     GERD (gastroesophageal reflux disease)     HLD (hyperlipidemia)     HTN (hypertension)     Paroxysmal atrial fibrillation     Unspecified cirrhosis of liver     Unspecified cirrhosis of liver         Subjective:     HPI:  Modesto Patrick is a 61 y.o. male with wounds to bilateral lower extremities.He has redness to perineum and small laceration on penis. Wounds have improved and redness to perineum has resolved. He was admitted due to congestive heart failure. Past medical history significant for CHF, paroxysmal afib, chronic anticoagulation of eliquis, HTN, HLD, AAA repair, HLD, COPD on home oxygen, CKD stage III, cirrhosis and GERD.  Pertinent factors that delay wound healing include multiple co-morbidities, poor vascular supply, edema, fragile skin and no protective sensation.     Review of Systems   Reason unable to perform ROS: patient confused.   Constitutional: Negative for activity change, chills and fever.   Respiratory: Negative for chest tightness and shortness of breath.    Cardiovascular: Positive for leg swelling. Negative for chest pain and palpitations.   Skin: Positive for wound.        wound   Neurological: Positive for weakness.   Psychiatric/Behavioral: Negative for agitation, behavioral problems, confusion and self-injury.     Objective:     Vital Signs (Most Recent):  Temp: 97.5 °F (36.4 °C) (05/17/22 0730)  Pulse:  61 (05/17/22 0800)  Resp: (!) 25 (05/17/22 0800)  BP: 115/66 (05/17/22 0800)  SpO2: 100 % (05/17/22 0800) Vital Signs (24h Range):  Temp:  [97.5 °F (36.4 °C)-98.7 °F (37.1 °C)] 97.5 °F (36.4 °C)  Pulse:  [] 61  Resp:  [13-29] 25  SpO2:  [78 %-100 %] 100 %  BP: ()/(50-77) 115/66     Weight: 115.3 kg (254 lb 3.1 oz)  Body mass index is 31.77 kg/m².  No data recorded    Physical Exam  Vitals reviewed.   HENT:      Head: Normocephalic.   Cardiovascular:      Rate and Rhythm: Normal rate and regular rhythm.      Pulses: Normal pulses.      Heart sounds: Normal heart sounds.   Pulmonary:      Effort: Pulmonary effort is normal.      Breath sounds: Normal breath sounds.   Skin:     Capillary Refill: Capillary refill takes 2 to 3 seconds.      Findings: Erythema and rash present.      Comments: Wound, see wound assessment and pictures   Neurological:      Mental Status: He is alert. Mental status is at baseline.      Motor: Weakness present.            Altered Skin Integrity 05/10/22 1400 posterior other (see comments) Incontinence associated dermatitis (Active)   05/10/22 1400   Altered Skin Integrity Present on Admission:    Side:    Orientation: posterior   Location: other (see comments)   Wound Number:    Is this injury device related?: No   Primary Wound Type: Incontinence   Description of Altered Skin Integrity:    Removal Indication and Assessment:    Wound Outcome:    (Retired) Wound Length (cm):    (Retired) Wound Width (cm):    (Retired) Depth (cm):    Wound Description (Comments):    Removal Indications:    Wound Image    05/10/22 1400   Dressing Appearance Open to air 05/16/22 1130   Drainage Amount None 05/14/22 1200   Appearance Intact 05/13/22 0800   Tissue loss description Not applicable 05/10/22 1400   Black (%), Wound Tissue Color 0 % 05/10/22 1400   Red (%), Wound Tissue Color 0 % 05/10/22 1400   Yellow (%), Wound Tissue Color 0 % 05/10/22 1400   Periwound Area Dry 05/10/22 1400   Wound Edges  Undefined 05/10/22 1400   Care Cleansed with:;Soap and water 05/13/22 1315            Altered Skin Integrity 05/10/22 1400 Left lateral Back Other (comment) (Active)   05/10/22 1400   Altered Skin Integrity Present on Admission:    Side: Left   Orientation: lateral   Location: Back   Wound Number:    Is this injury device related?: No   Primary Wound Type: Other   Description of Altered Skin Integrity:    Removal Indication and Assessment:    Wound Outcome:    (Retired) Wound Length (cm):    (Retired) Wound Width (cm):    (Retired) Depth (cm):    Wound Description (Comments):    Removal Indications:    Wound Image    05/10/22 1400   Dressing Appearance Open to air 05/16/22 1130   Drainage Amount None 05/14/22 0745   Appearance Dry;Intact 05/13/22 0800   Tissue loss description Not applicable 05/10/22 1400   Black (%), Wound Tissue Color 0 % 05/10/22 1400   Red (%), Wound Tissue Color 0 % 05/10/22 1400   Yellow (%), Wound Tissue Color 0 % 05/10/22 1400   Periwound Area Dry;Satellite lesion 05/10/22 1400   Wound Edges Undefined 05/10/22 1400   Wound Length (cm) 0 cm 05/10/22 1400   Wound Width (cm) 0 cm 05/10/22 1400   Wound Depth (cm) 0 cm 05/10/22 1400   Wound Volume (cm^3) 0 cm^3 05/10/22 1400   Wound Surface Area (cm^2) 0 cm^2 05/10/22 1400   Care Cleansed with:;Soap and water 05/13/22 1315            Altered Skin Integrity 05/10/22 1400 Right anterior Knee Abrasion(s) (Active)   05/10/22 1400   Altered Skin Integrity Present on Admission:    Side: Right   Orientation: anterior   Location: Knee   Wound Number:    Is this injury device related?: No   Primary Wound Type: Abrasion(s)   Description of Altered Skin Integrity:    Removal Indication and Assessment:    Wound Outcome:    (Retired) Wound Length (cm):    (Retired) Wound Width (cm):    (Retired) Depth (cm):    Wound Description (Comments):    Removal Indications:    Wound Image    05/10/22 1400   Dressing Appearance Open to air 05/16/22 1920   Drainage Amount  None 05/16/22 1920   Appearance Black;Dry 05/13/22 1245   Tissue loss description Not applicable 05/13/22 1245   Black (%), Wound Tissue Color 100 % 05/10/22 1400   Red (%), Wound Tissue Color 0 % 05/10/22 1400   Yellow (%), Wound Tissue Color 0 % 05/10/22 1400   Periwound Area Dry 05/13/22 1245   Wound Edges Defined 05/13/22 1245   Wound Length (cm) 0 cm 05/10/22 1400   Wound Width (cm) 0 cm 05/10/22 1400   Wound Depth (cm) 0 cm 05/10/22 1400   Wound Volume (cm^3) 0 cm^3 05/10/22 1400   Wound Surface Area (cm^2) 0 cm^2 05/10/22 1400   Care Cleansed with:;Soap and water 05/13/22 1245            Altered Skin Integrity 05/10/22 1400 Right anterior;lower;lateral;medial Leg Non pressure chronic ulcer (Active)   05/10/22 1400   Altered Skin Integrity Present on Admission:    Side: Right   Orientation: anterior;lower;lateral;medial   Location: Leg   Wound Number:    Is this injury device related?: No   Primary Wound Type: Non pressure   Description of Altered Skin Integrity:    Removal Indication and Assessment:    Wound Outcome:    (Retired) Wound Length (cm):    (Retired) Wound Width (cm):    (Retired) Depth (cm):    Wound Description (Comments):    Removal Indications:    Wound Image      05/10/22 1400   Dressing Appearance Dry;Intact 05/16/22 1920   Drainage Amount None 05/16/22 1920   Appearance Dressing in place, unable to visualize 05/16/22 1920   Periwound Area Dry 05/13/22 1245   Wound Edges Undefined 05/13/22 1245   Care Cleansed with:;Soap and water;Applied:;Moisturizing agent 05/13/22 1245   Dressing Applied;Silver 05/12/22 1100            Altered Skin Integrity 05/10/22 1400 Left anterior;lower;medial;lateral Leg Non pressure chronic ulcer (Active)   05/10/22 1400   Altered Skin Integrity Present on Admission:    Side: Left   Orientation: anterior;lower;medial;lateral   Location: Leg   Wound Number:    Is this injury device related?: No   Primary Wound Type: Non pressure   Description of Altered Skin  Integrity:    Removal Indication and Assessment:    Wound Outcome:    (Retired) Wound Length (cm):    (Retired) Wound Width (cm):    (Retired) Depth (cm):    Wound Description (Comments):    Removal Indications:    Wound Image      05/10/22 1400   Dressing Appearance Open to air 05/16/22 1130   Drainage Amount None 05/14/22 0745   Appearance Dressing in place, unable to visualize 05/16/22 1920   Tissue loss description Not applicable 05/13/22 1245   Wound Edges Undefined 05/13/22 1245   Care Cleansed with:;Soap and water 05/14/22 0745            Altered Skin Integrity 05/10/22 1400 Left anterior;dorsal Foot Ulceration (Active)   05/10/22 1400   Altered Skin Integrity Present on Admission:    Side: Left   Orientation: anterior;dorsal   Location: Foot   Wound Number:    Is this injury device related?: No   Primary Wound Type: Ulceration   Description of Altered Skin Integrity:    Removal Indication and Assessment:    Wound Outcome:    (Retired) Wound Length (cm):    (Retired) Wound Width (cm):    (Retired) Depth (cm):    Wound Description (Comments):    Removal Indications:    Wound Image    05/10/22 1400   Dressing Appearance Intact;Dry;Clean 05/16/22 1920   Drainage Amount None 05/16/22 1920   Appearance Dressing in place, unable to visualize 05/16/22 1920   Tissue loss description Partial thickness 05/13/22 1245   Black (%), Wound Tissue Color 20 % 05/10/22 1400   Red (%), Wound Tissue Color 80 % 05/10/22 1400   Yellow (%), Wound Tissue Color 0 % 05/10/22 1400   Periwound Area Dry;Edematous 05/13/22 1245   Wound Edges Defined 05/13/22 1245   Wound Length (cm) 1.5 cm 05/10/22 1400   Wound Width (cm) 1.5 cm 05/10/22 1400   Wound Depth (cm) 0 cm 05/10/22 1400   Wound Volume (cm^3) 0 cm^3 05/10/22 1400   Wound Surface Area (cm^2) 2.25 cm^2 05/10/22 1400   Care Cleansed with:;Soap and water;Wound cleanser 05/15/22 1400   Dressing Changed;Silver;Gauze 05/15/22 1400   Dressing Change Due 05/14/22 05/13/22 1245             Altered Skin Integrity 05/10/22 1400 Right lateral Leg Ulceration (Active)   05/10/22 1400   Altered Skin Integrity Present on Admission:    Side: Right   Orientation: lateral   Location: Leg   Wound Number:    Is this injury device related?: No   Primary Wound Type: Ulceration   Description of Altered Skin Integrity:    Removal Indication and Assessment:    Wound Outcome:    (Retired) Wound Length (cm):    (Retired) Wound Width (cm):    (Retired) Depth (cm):    Wound Description (Comments):    Removal Indications:    Wound Image    05/10/22 1400   Dressing Appearance Dry;Intact 05/16/22 1920   Drainage Amount None 05/13/22 1245   Drainage Characteristics/Odor Serosanguineous 05/11/22 2000   Appearance Pink 05/13/22 1315   Tissue loss description Partial thickness 05/11/22 1000   Black (%), Wound Tissue Color 0 % 05/10/22 1400   Red (%), Wound Tissue Color 99 % 05/10/22 1400   Yellow (%), Wound Tissue Color 0 % 05/10/22 1400   Periwound Area Edematous 05/13/22 1245   Wound Edges Undefined 05/13/22 1245   Care Cleansed with:;Soap and water 05/13/22 1245   Dressing Removed 05/13/22 1315            Altered Skin Integrity 05/10/22 1400 anterior Penis Ulceration (Active)   05/10/22 1400   Altered Skin Integrity Present on Admission:    Side:    Orientation: anterior   Location: Penis   Wound Number:    Is this injury device related?:    Primary Wound Type: Ulceration   Description of Altered Skin Integrity:    Removal Indication and Assessment:    Wound Outcome:    (Retired) Wound Length (cm):    (Retired) Wound Width (cm):    (Retired) Depth (cm):    Wound Description (Comments):    Removal Indications:    Wound Image    05/10/22 1400   Dressing Appearance Open to air 05/16/22 1920   Drainage Amount None 05/16/22 1920   Drainage Characteristics/Odor Serosanguineous 05/11/22 2000   Appearance Pink 05/13/22 0800   Tissue loss description Partial thickness 05/10/22 1400   Black (%), Wound Tissue Color 0 % 05/10/22 1400    Red (%), Wound Tissue Color 100 % 05/10/22 1400   Yellow (%), Wound Tissue Color 0 % 05/10/22 1400   Periwound Area Edematous;Moist 05/10/22 1400   Wound Edges Defined 05/10/22 1400   Care Soap and water;Skin Barrier 05/15/22 1400            Altered Skin Integrity 05/10/22 1400 anterior Scrotum Other (comment) (Active)   05/10/22 1400   Altered Skin Integrity Present on Admission:    Side:    Orientation: anterior   Location: Scrotum   Wound Number:    Is this injury device related?: No   Primary Wound Type: Other   Description of Altered Skin Integrity:    Removal Indication and Assessment:    Wound Outcome:    (Retired) Wound Length (cm):    (Retired) Wound Width (cm):    (Retired) Depth (cm):    Wound Description (Comments):    Removal Indications:    Wound Image    05/10/22 1400   Dressing Appearance Open to air 05/16/22 1920   Drainage Amount None 05/16/22 1920   Appearance Pink 05/13/22 0800   Tissue loss description Not applicable 05/11/22 1000   Periwound Area Edematous;Swelling 05/15/22 1400   Wound Edges Undefined 05/13/22 1245   Care Cleansed with:;Soap and water 05/16/22 1920   Dressing Other (comment) 05/15/22 1400   Packing other (see comment) 05/14/22 2000            Altered Skin Integrity 05/10/22 1400 Left posterior Elbow Skin Tear Partial thickness tissue loss. Shallow open ulcer with a red or pink wound bed, without slough. Intact or Open/Ruptured Serum-filled blister. (Active)   05/10/22 1400   Altered Skin Integrity Present on Admission:    Side: Left   Orientation: posterior   Location: Elbow   Wound Number:    Is this injury device related?: No   Primary Wound Type: Skin tear   Description of Altered Skin Integrity: Partial thickness tissue loss. Shallow open ulcer with a red or pink wound bed, without slough. Intact or Open/Ruptured Serum-filled blister.   Removal Indication and Assessment:    Wound Outcome:    (Retired) Wound Length (cm):    (Retired) Wound Width (cm):    (Retired) Depth  (cm):    Wound Description (Comments):    Removal Indications:    Wound Image    05/10/22 1400   Description of Altered Skin Integrity Partial thickness tissue loss. Shallow open ulcer with a red or pink wound bed, without slough. Intact or Open/Ruptured Serum-filled blister. 05/10/22 1400   Dressing Appearance Dry;Intact 05/16/22 1920   Drainage Amount None 05/11/22 2000   Appearance Dressing in place, unable to visualize 05/16/22 1920   Tissue loss description Partial thickness 05/10/22 1400   Black (%), Wound Tissue Color 0 % 05/10/22 1400   Red (%), Wound Tissue Color 99 % 05/10/22 1400   Yellow (%), Wound Tissue Color 0 % 05/10/22 1400   Periwound Area Dry 05/10/22 1400   Wound Edges Defined 05/10/22 1400   Wound Length (cm) 1.5 cm 05/10/22 1400   Wound Width (cm) 1.5 cm 05/10/22 1400   Wound Depth (cm) 0.1 cm 05/10/22 1400   Wound Volume (cm^3) 0.225 cm^3 05/10/22 1400   Wound Surface Area (cm^2) 2.25 cm^2 05/10/22 1400   Care Cleansed with:;Soap and water;Applied:;Skin Barrier 05/10/22 1400   Dressing Foam 05/16/22 1920       Assessment and Plan      Wound Assessment:  Venous ulcers  Venous statis dermatitis   Incontinence Dermatits    Wound Care Plan:   Venous ulcers  Clean wound with baby shampoo and water or vashe   Apply sensicare around wound   Apply silvadene to open wounds   Cover and secure with 4x4s, mauricio, and paper tape   Change daily  Venous statis dermatitis  Apply aloe vesta antifungal to BLE  Incontinence Dermatitis  Apply sensicare and miconazole to perineum/penis twice daily          Signature:  RENEE Lofton  Wound Care    Date of encounter: 05/17/2022

## 2022-05-17 NOTE — ASSESSMENT & PLAN NOTE
- functioning properly    Per echo 5/10/22:  · There is a bioprosthetic aortic valve present. There is no aortic insufficiency present. Prosthetic aortic valve is normal.  · The aortic valve mean gradient is 9 mmHg with a dimensionless index of 0.14

## 2022-05-17 NOTE — ASSESSMENT & PLAN NOTE
Renal function has improved due to improved perfusion while on dopamine.  He is diuresing at a brisk rate.  He has contraction alkalosis

## 2022-05-17 NOTE — ASSESSMENT & PLAN NOTE
- creatinine is 3.7, was 2.8  - nephrology following    5/12/2022:  - Creatinine 3.65, continue monitoring    5/13/22:  - creatinine slightly improved today at 3.3, continue to monitor    5/16/22:  - creatinine has improved with diuresis, now 2.4

## 2022-05-17 NOTE — ASSESSMENT & PLAN NOTE
- Continuous oxygen  - Continue bumetinide 1mg IV bid  - duonebs  - Cardiology consult  - ECHO pending  - telemetry  - continue metoprolol 50mg bid   - daily weights  - daily I+O  - low sodium diet  - dietary consult      05/11 sob this morning  02 per nc   Cardiology consulted       05/12 continue diuresis   Continue dopamine drip    5/16 met this morning on oral and IV infusion of amiodarone which was added yesterday due to AFib RVR.  Patient being diuresed.  Feeling better overall.  Will get Cardiology input on his medications - need an oral regimen for discharge home.  Discussed with his sister Eber with open have the patient back home to his end of this week on hospice.  He does not wear Keen catheter at home and therefore we will get this discontinued today.  Case discussed with  for her to get discharge plan in progress.    5/17 case was discussed with cardiologist Dr. Adame yesterday and the patient's overall prognosis is poor therefore he agrees with plan for hospice.  Patient transition to all oral medications and I discussed the case with  today who has set up home hospice and so patient will be discharged tomorrow.

## 2022-05-18 VITALS
SYSTOLIC BLOOD PRESSURE: 90 MMHG | TEMPERATURE: 99 F | DIASTOLIC BLOOD PRESSURE: 47 MMHG | WEIGHT: 254.44 LBS | HEART RATE: 64 BPM | HEIGHT: 75 IN | OXYGEN SATURATION: 97 % | BODY MASS INDEX: 31.64 KG/M2 | RESPIRATION RATE: 23 BRPM

## 2022-05-18 LAB
ALBUMIN SERPL BCP-MCNC: 3.1 G/DL (ref 3.5–5)
ALBUMIN/GLOB SERPL: 1.1 {RATIO}
ALP SERPL-CCNC: 87 U/L (ref 45–115)
ALT SERPL W P-5'-P-CCNC: 16 U/L (ref 16–61)
ANION GAP SERPL CALCULATED.3IONS-SCNC: 2 MMOL/L (ref 7–16)
AST SERPL W P-5'-P-CCNC: 15 U/L (ref 15–37)
BILIRUB SERPL-MCNC: 0.8 MG/DL (ref 0–1.2)
BUN SERPL-MCNC: 83 MG/DL (ref 7–18)
BUN/CREAT SERPL: 30 (ref 6–20)
CALCIUM SERPL-MCNC: 8.4 MG/DL (ref 8.5–10.1)
CHLORIDE SERPL-SCNC: 87 MMOL/L (ref 98–107)
CO2 SERPL-SCNC: 52 MMOL/L (ref 21–32)
CREAT SERPL-MCNC: 2.81 MG/DL (ref 0.7–1.3)
GLOBULIN SER-MCNC: 2.7 G/DL (ref 2–4)
GLUCOSE SERPL-MCNC: 87 MG/DL (ref 74–106)
POTASSIUM SERPL-SCNC: 3.5 MMOL/L (ref 3.5–5.1)
PROT SERPL-MCNC: 5.8 G/DL (ref 6.4–8.2)
SODIUM SERPL-SCNC: 137 MMOL/L (ref 136–145)

## 2022-05-18 PROCEDURE — 25000003 PHARM REV CODE 250: Performed by: STUDENT IN AN ORGANIZED HEALTH CARE EDUCATION/TRAINING PROGRAM

## 2022-05-18 PROCEDURE — 25000003 PHARM REV CODE 250: Performed by: NURSE PRACTITIONER

## 2022-05-18 PROCEDURE — 25000003 PHARM REV CODE 250: Performed by: INTERNAL MEDICINE

## 2022-05-18 PROCEDURE — 99239 HOSP IP/OBS DSCHRG MGMT >30: CPT | Mod: ,,, | Performed by: INTERNAL MEDICINE

## 2022-05-18 PROCEDURE — 27000221 HC OXYGEN, UP TO 24 HOURS

## 2022-05-18 PROCEDURE — 25000003 PHARM REV CODE 250

## 2022-05-18 PROCEDURE — 36415 COLL VENOUS BLD VENIPUNCTURE: CPT | Performed by: INTERNAL MEDICINE

## 2022-05-18 PROCEDURE — 25000242 PHARM REV CODE 250 ALT 637 W/ HCPCS

## 2022-05-18 PROCEDURE — 99239 PR HOSPITAL DISCHARGE DAY,>30 MIN: ICD-10-PCS | Mod: ,,, | Performed by: INTERNAL MEDICINE

## 2022-05-18 PROCEDURE — 80053 COMPREHEN METABOLIC PANEL: CPT | Performed by: INTERNAL MEDICINE

## 2022-05-18 PROCEDURE — 94640 AIRWAY INHALATION TREATMENT: CPT

## 2022-05-18 RX ORDER — MIDODRINE HYDROCHLORIDE 5 MG/1
5 TABLET ORAL
Qty: 90 TABLET | Refills: 11 | Status: SHIPPED | OUTPATIENT
Start: 2022-05-18 | End: 2023-05-18

## 2022-05-18 RX ORDER — POLYETHYLENE GLYCOL 3350 17 G/17G
17 POWDER, FOR SOLUTION ORAL DAILY
Qty: 30 PACKET | Refills: 0 | Status: SHIPPED | OUTPATIENT
Start: 2022-05-19

## 2022-05-18 RX ORDER — BUMETANIDE 1 MG/1
3 TABLET ORAL EVERY 12 HOURS
Qty: 60 TABLET | Refills: 0 | Status: SHIPPED | OUTPATIENT
Start: 2022-05-18 | End: 2023-05-18

## 2022-05-18 RX ORDER — METOLAZONE 5 MG/1
5 TABLET ORAL DAILY
Qty: 30 TABLET | Refills: 11 | Status: SHIPPED | OUTPATIENT
Start: 2022-05-19 | End: 2023-05-19

## 2022-05-18 RX ORDER — METOPROLOL TARTRATE 25 MG/1
25 TABLET, FILM COATED ORAL 2 TIMES DAILY
Qty: 60 TABLET | Refills: 11 | Status: SHIPPED | OUTPATIENT
Start: 2022-05-18 | End: 2023-05-18

## 2022-05-18 RX ORDER — BUPROPION HYDROCHLORIDE 75 MG/1
75 TABLET ORAL 2 TIMES DAILY
Qty: 60 TABLET | Refills: 11 | Status: SHIPPED | OUTPATIENT
Start: 2022-05-18 | End: 2023-05-18

## 2022-05-18 RX ORDER — FAMOTIDINE 20 MG/1
20 TABLET, FILM COATED ORAL DAILY
Qty: 30 TABLET | Refills: 11 | Status: SHIPPED | OUTPATIENT
Start: 2022-05-19 | End: 2023-05-19

## 2022-05-18 RX ORDER — QUETIAPINE FUMARATE 50 MG/1
50 TABLET, FILM COATED ORAL 2 TIMES DAILY
Qty: 60 TABLET | Refills: 11 | Status: SHIPPED | OUTPATIENT
Start: 2022-05-18 | End: 2023-05-18

## 2022-05-18 RX ORDER — LACTULOSE 10 G/15ML
20 SOLUTION ORAL 4 TIMES DAILY
Qty: 3600 ML | Refills: 0 | Status: SHIPPED | OUTPATIENT
Start: 2022-05-18

## 2022-05-18 RX ADMIN — RIFAXIMIN 550 MG: 550 TABLET ORAL at 08:05

## 2022-05-18 RX ADMIN — METOLAZONE 5 MG: 5 TABLET ORAL at 08:05

## 2022-05-18 RX ADMIN — MICONAZOLE NITRATE 2 % TOPICAL POWDER: at 08:05

## 2022-05-18 RX ADMIN — POLYETHYLENE GLYCOL 3350 17 G: 17 POWDER, FOR SOLUTION ORAL at 08:05

## 2022-05-18 RX ADMIN — METOPROLOL TARTRATE 25 MG: 25 TABLET, FILM COATED ORAL at 08:05

## 2022-05-18 RX ADMIN — MIDODRINE HYDROCHLORIDE 5 MG: 5 TABLET ORAL at 08:05

## 2022-05-18 RX ADMIN — LACTULOSE 20 G: 20 SOLUTION ORAL at 12:05

## 2022-05-18 RX ADMIN — LACTULOSE 20 G: 20 SOLUTION ORAL at 08:05

## 2022-05-18 RX ADMIN — QUETIAPINE 50 MG: 25 TABLET ORAL at 08:05

## 2022-05-18 RX ADMIN — IPRATROPIUM BROMIDE AND ALBUTEROL SULFATE 3 ML: 2.5; .5 SOLUTION RESPIRATORY (INHALATION) at 12:05

## 2022-05-18 RX ADMIN — BUMETANIDE 3 MG: 1 TABLET ORAL at 08:05

## 2022-05-18 RX ADMIN — AMIODARONE HYDROCHLORIDE 200 MG: 200 TABLET ORAL at 08:05

## 2022-05-18 RX ADMIN — MIDODRINE HYDROCHLORIDE 5 MG: 5 TABLET ORAL at 12:05

## 2022-05-18 RX ADMIN — APIXABAN 5 MG: 5 TABLET, FILM COATED ORAL at 08:05

## 2022-05-18 RX ADMIN — IPRATROPIUM BROMIDE AND ALBUTEROL SULFATE 3 ML: 2.5; .5 SOLUTION RESPIRATORY (INHALATION) at 07:05

## 2022-05-18 RX ADMIN — FAMOTIDINE 20 MG: 20 TABLET ORAL at 08:05

## 2022-05-18 NOTE — DISCHARGE SUMMARY
[facility]  Discharge Note  Short Stay    * No surgery found *    OUTCOME: Patient tolerated treatment/procedure well without complication and is now ready for discharge.    DISPOSITION: Home or Self Care  - home hospice    FINAL DIAGNOSIS:  Biventricular congestive heart failure    FOLLOWUP: With primary care provider    DISCHARGE INSTRUCTIONS:  No discharge procedures on file.     TIME SPENT ON DISCHARGE: 35 minutes

## 2022-05-18 NOTE — DISCHARGE SUMMARY
[facility] Medicine  Discharge Summary      Patient Name: Modesto Patrick  MRN: [id number]  Patient Class: IP- Inpatient  Admission Date: 5/9/2022  Hospital Length of Stay: 9 days  Discharge Date and Time:  05/18/2022 9:21 AM  Attending Physician: Leah Lora,*   Discharging Provider: Leah Lora MD  Primary Care Provider: Primary Doctor No      HPI:   The patient is a 60yo AA male with a MedHX of CHF, paroxysmal afib, chronic anticoagulation of eliquis, HTN, HLD, AAA repair, HLD, COPD on home oxygen, CKD stage III, cirrhosis and GERD who presents to LTAC for long-term care of his CHF exacerbation and acute on chronic renal failure. The patient had originally been admitted to [facility] for CHF exacerbation on 04/27, where he was also found to have afib with RVR and heme positive stools. His Eliquis was held and GI saw him and did not recommend any inpatient GI workup. He was started back on his Eliquis and IV diuresis with bumex. This was stopped after his creatinine was found to be elevated above baseline. He was started back on diuretics after his volume status was found to still be decompensated. The patient's home metoprolol dose was also increased while at [facility], and his home losartan, was held.    The patient was found to be stable at [facility] and was accepted to LTAC for further monitoring/care by Dr. Solis for his acute on chronic CHF exacerbation.      * No surgery found *      Hospital Course:   05/11 Awake and resting in bed. Having sob. Edematous.  Sat is 96%. Vitals signs stable. Recheck potassium level today.  05/12 Mr. Patrick had afib with RVR and HF on 05/11. Required IV amiodarone and dopamine. Heart rate this am is in 70s - will dc amio drip and transition to amiodarone po. States he is feeling about the same.    5/18 patient with multiple comorbidities including end-stage biventricular heart failure, cirrhosis,  was in hospital due to acute decompensation.  Required intravenous inotropes, IV amiodarone.  He stabilized and this week appears to be at his new baseline.  On the day of discharge vital signs were 105/58, 66, 16, 98.6, 91% on 2 L of oxygen.  On exam he was comfortable having just completed his entire breakfast.  No oral exudates, moist mucosa, no added lung sounds, lowered systolic heart murmur, abdomen soft and nontender, chronic bilateral leg edema.  Lab investigations reviewed, slight increase in creatinine related to diuresis.  Patient with overall poor prognosis and he accepted hospice care at home.  His care was discussed with his sister 2 days ago and with his brother yesterday.       Goals of Care Treatment Preferences:  Code Status: DNR      Consults:   Consults (From admission, onward)        Status Ordering Provider     Inpatient consult to Social Work  Once        Provider:  (Not yet assigned)    Completed TWIN CORNELL     Inpatient consult to Social Work  Once        Provider:  (Not yet assigned)    Completed GLORIA BALL U     Inpatient consult to Pulmonology  Once        Provider:  Obi Donovan MD    Completed PINO MONAHAN     Inpatient consult to Nephrology  Once        Provider:  Jaiden Grant MD    Acknowledged PINO MONAHAN     Inpatient consult to Registered Dietitian/Nutritionist  Once        Provider:  (Not yet assigned)    Completed JUAN VAN     Inpatient consult to Cardiology  Once        Provider:  (Not yet assigned)    Completed JUAN VAN          * Acute on chronic biventricular congestive heart failure  - Continuous oxygen  - Continue bumetinide 1mg IV bid  - duonebs  - Cardiology consult  - ECHO pending  - telemetry  - continue metoprolol 50mg bid   - daily weights  - daily I+O  - low sodium diet  - dietary consult      05/11 sob this morning  02 per nc   Cardiology consulted       05/12 continue diuresis   Continue dopamine drip    5/16 met  this morning on oral and IV infusion of amiodarone which was added yesterday due to AFib RVR.  Patient being diuresed.  Feeling better overall.  Will get Cardiology input on his medications - need an oral regimen for discharge home.  Discussed with his sister Eber with open have the patient back home to his end of this week on hospice.  He does not wear Keen catheter at home and therefore we will get this discontinued today.  Case discussed with  for her to get discharge plan in progress.    5/17 case was discussed with cardiologist Dr. Adame yesterday and the patient's overall prognosis is poor therefore he agrees with plan for hospice.  Patient transition to all oral medications and I discussed the case with  today who has set up home hospice and so patient will be discharged tomorrow.    5/18 On the day of discharge vital signs were 105/58, 66, 16, 98.6, 91% on 2 L of oxygen.  On exam he was comfortable having just completed his entire breakfast.  No oral exudates, moist mucosa, no added lung sounds, lowered systolic heart murmur, abdomen soft and nontender, chronic bilateral leg edema.  Lab investigations reviewed, slight increase in creatinine related to diuresis.  Patient with overall poor prognosis and he accepted hospice care at home.  His care was discussed with his sister 2 days ago and with his brother yesterday.      Final Active Diagnoses:    Diagnosis Date Noted POA    PRINCIPAL PROBLEM:  Acute on chronic biventricular congestive heart failure [I50.82] 05/09/2022 Yes    Constipation [K59.00] 05/16/2022 No    History of aortic valve repair [Z98.890, Z86.79] 05/13/2022 Not Applicable    Renal failure (ARF), acute on chronic [N17.9, N18.9] 05/10/2022 Unknown    Incontinence associated dermatitis [L25.8, R32]  Yes    Venous ulcers of both lower extremities [I83.019, L97.919, I83.029, L97.929]  Yes    Venous stasis dermatitis of both lower extremities [I87.2]  Yes     A-fib [I48.91] 05/09/2022 Yes    HTN (hypertension) [I10] 05/09/2022 Yes    Cirrhosis [K74.60] 05/09/2022 Yes    Depression [F32.A] 05/09/2022 Yes    COPD (chronic obstructive pulmonary disease) [J44.9] 05/09/2022 Yes    CKD (chronic kidney disease) [N18.9] 05/09/2022 Yes    Multiple wounds of skin [T14.8XXA] 05/09/2022 Yes    GERD (gastroesophageal reflux disease) [K21.9] 05/09/2022 Yes      Problems Resolved During this Admission:       Discharged Condition: stable    Disposition:     Follow Up:   Contact information for follow-up providers     Primary Doctor No. Schedule an appointment as soon as possible for a visit in 2 week(s).                 Contact information for after-discharge care     Home Medical Care     [facility]  .    Service: Home Hospice  Contact information:  [address] [place], [place] [address]  [phone]                           Patient Instructions:   No discharge procedures on file.    Significant Diagnostic Studies: Labs:   BMP:   Recent Labs   Lab 05/17/22 0339 05/18/22 0318   GLU 93 87    137   K 4.0 3.5   CL 88* 87*   CO2 50* 52*   BUN 75* 83*   CREATININE 2.55* 2.81*   CALCIUM 8.8 8.4*   , CMP   Recent Labs   Lab 05/17/22 0339 05/18/22 0318    137   K 4.0 3.5   CL 88* 87*   CO2 50* 52*   GLU 93 87   BUN 75* 83*   CREATININE 2.55* 2.81*   CALCIUM 8.8 8.4*   PROT 6.0* 5.8*   ALBUMIN 3.1* 3.1*   BILITOT 0.7 0.8   ALKPHOS 91 87   AST 17 15   ALT 17 16   ANIONGAP 3* 2*   EGFRNONAA 27* 24*   , CBC   Recent Labs   Lab 05/17/22 0339   WBC 6.66   HGB 9.4*   HCT 29.9*   *   , Lipid Panel No results found for: CHOL, HDL, LDLCALC, TRIG, CHOLHDL, Troponin No results for input(s): TROPONINI in the last 168 hours., A1C: No results for input(s): HGBA1C in the last 4320 hours. and All labs within the past 24 hours have been reviewed  Radiology: X-Ray: CXR: X-Ray Chest 1 View (CXR): No results found for this visit on  05/09/22.  Nuclear Medicine: n/a  Cardiac Graphics: Echocardiogram:   2D echo with color flow doppler: No results found for this or any previous visit. and Transthoracic echo (TTE) complete (Cupid Only):   Results for orders placed or performed during the hospital encounter of 05/09/22   Echo   Result Value Ref Range    BSA 2.57 m2    Right Atrial Pressure (from IVC) 15 mmHg    EF 20 %    Left Ventricular Outflow Tract Mean Gradient 0.30 mmHg    AORTIC VALVE CUSP SEPERATION 15.059596309678285 cm    LVIDd 5.90 3.5 - 6.0 cm    IVS 1.22 (A) 0.6 - 1.1 cm    Posterior Wall 1.27 (A) 0.6 - 1.1 cm    Ao root annulus 3.40 cm    LVIDs 5.40 (A) 2.1 - 4.0 cm    FS 8 28 - 44 %    IVC ostium 3.0 cm    LV mass 321.10 g    LA size 4.80 cm    RVDD 5.10 cm    TAPSE 1.90 cm    Left Ventricle Relative Wall Thickness 0.43 cm    AV mean gradient 9 mmHg    AV valve area 0.47 cm2    AV Velocity Ratio 0.20     AV index (prosthetic) 0.14     MV mean gradient 17 mmHg    MV valve area p 1/2 method 4.40 cm2    MV valve area by continuity eq 0.30 cm2    E wave deceleration time 179 msec    LVOT diameter 2.10 cm    LVOT area 3.5 cm2    LVOT peak walker 0.4 m/s    LVOT peak VTI 5.00 cm    Ao peak walker 2.0 m/s    Ao VTI 37.00 cm    LVOT stroke volume 17.31 cm3    AV peak gradient 16 mmHg    MV peak gradient 28 mmHg    TV rest pulmonary artery pressure 40 mmHg    MV Peak E Walker 1.80 m/s    TR Max Walker 2.50 m/s    MV VTI 57.0 cm    MV stenosis pressure 1/2 time 50 ms    LV Systolic Volume 141.30 mL    LV Systolic Volume Index 56.3 mL/m2    LV Diastolic Volume 173.20 mL    LV Diastolic Volume Index 69.00 mL/m2    LV Mass Index 128 g/m2    Echo EF Estimated 25 %    RA Major Axis 5.00 cm    Triscuspid Valve Regurgitation Peak Gradient 25 mmHg    Narrative    · The left ventricle is moderately enlarged with mild concentric   hypertrophy and severely decreased systolic function.  · The estimated ejection fraction is 20%.  · There is left ventricular global  hypokinesis.  · Moderate right ventricular enlargement with severely reduced right   ventricular systolic function.  · Moderate right atrial enlargement.  · Moderate left atrial enlargement.  · Moderate mitral regurgitation.  · Severe tricuspid regurgitation.  · Moderate to severe pulmonic regurgitation.  · There is a bioprosthetic aortic valve present. There is no aortic   insufficiency present. Prosthetic aortic valve is normal.  · The aortic valve mean gradient is 9 mmHg with a dimensionless index of   0.14.  · Elevated central venous pressure (15 mmHg).  · The estimated PA systolic pressure is 40 mmHg.  · There are bilateral pleural effusions.          Pending Diagnostic Studies:     Procedure Component Value Units Date/Time    EKG 12-lead [[id number]]     Order Status: Sent Lab Status: No result     EXTRA TUBES [[id number]] Collected: 05/18/22 0444    Order Status: Sent Lab Status: In process Updated: 05/18/22 0444    Specimen: Blood, Venous     Narrative:      The following orders were created for panel order EXTRA TUBES.  Procedure                               Abnormality         Status                     ---------                               -----------         ------                     Lavender Top Hold[[id number]]                                In process                   Please view results for these tests on the individual orders.    EXTRA TUBES [[id number]]     Order Status: Sent Lab Status: No result     Specimen: Blood, Venous     Narrative:      The following orders were created for panel order EXTRA TUBES.  Procedure                               Abnormality         Status                     ---------                               -----------         ------                     Lavender Top Hold[[id number]]                                                             Please view results for these tests on the individual orders.    EXTRA TUBES [[id number]] Collected: 05/14/22 0445    Order Status:  Sent Lab Status: In process Updated: 05/14/22 0537    Specimen: Blood, Venous     Narrative:      The following orders were created for panel order EXTRA TUBES.  Procedure                               Abnormality         Status                     ---------                               -----------         ------                     Lavender Top Hold[[id number]]                                In process                   Please view results for these tests on the individual orders.    EXTRA TUBES [[id number]] Collected: 05/13/22 1327    Order Status: Sent Lab Status: In process Updated: 05/13/22 1327    Specimen: Blood, Venous     Narrative:      The following orders were created for panel order EXTRA TUBES.  Procedure                               Abnormality         Status                     ---------                               -----------         ------                     Red Top Hold[[id number]]                                     In process                   Please view results for these tests on the individual orders.    EXTRA TUBES [[id number]] Collected: 05/13/22 0724    Order Status: Sent Lab Status: In process Updated: 05/13/22 0724    Specimen: Blood, Venous     Narrative:      The following orders were created for panel order EXTRA TUBES.  Procedure                               Abnormality         Status                     ---------                               -----------         ------                     Lavender Top Hold[[id number]]                                In process                   Please view results for these tests on the individual orders.    EXTRA TUBES [[id number]] Collected: 05/12/22 0322    Order Status: Sent Lab Status: In process Updated: 05/12/22 0322    Specimen: Blood, Venous     Narrative:      The following orders were created for panel order EXTRA TUBES.  Procedure                               Abnormality         Status                     ---------                                -----------         ------                     Lavender Top Hold[[id number]]                                In process                   Please view results for these tests on the individual orders.    EXTRA TUBES [[id number]] Collected: 05/11/22 1639    Order Status: Sent Lab Status: In process Updated: 05/11/22 1639    Specimen: Blood, Venous     Narrative:      The following orders were created for panel order EXTRA TUBES.  Procedure                               Abnormality         Status                     ---------                               -----------         ------                     Lavender Top Hold[[id number]]                                In process                   Please view results for these tests on the individual orders.    Lavender Top Hold [[id number]]     Order Status: Sent Lab Status: No result     Specimen: Blood, Venous          Medications:  Reconciled Home Medications:      Medication List      START taking these medications    buPROPion 75 MG tablet  Commonly known as: WELLBUTRIN  Take 1 tablet (75 mg total) by mouth 2 (two) times daily.     famotidine 20 MG tablet  Commonly known as: PEPCID  Take 1 tablet (20 mg total) by mouth once daily.  Start taking on: May 19, 2022     lactulose 20 gram/30 mL Soln  Commonly known as: CHRONULAC  Take 30 mLs (20 g total) by mouth 4 (four) times daily.     metoprolol tartrate 25 MG tablet  Commonly known as: LOPRESSOR  Take 1 tablet (25 mg total) by mouth 2 (two) times daily.     midodrine 5 MG Tab  Commonly known as: PROAMATINE  Take 1 tablet (5 mg total) by mouth 3 (three) times daily with meals.     polyethylene glycol 17 gram Pwpk  Commonly known as: GLYCOLAX  Take 17 g by mouth once daily.  Start taking on: May 19, 2022     rifAXIMin 550 mg Tab  Commonly known as: XIFAXAN  Take 1 tablet (550 mg total) by mouth 2 (two) times daily.        CHANGE how you take these medications    bumetanide 1 MG tablet  Commonly known as: BUMEX  Take 3  tablets (3 mg total) by mouth every 12 (twelve) hours.  What changed:   · medication strength  · how much to take  · when to take this     metOLazone 5 MG tablet  Commonly known as: ZAROXOLYN  Take 1 tablet (5 mg total) by mouth once daily.  Start taking on: May 19, 2022  What changed:   · medication strength  · how much to take     QUEtiapine 50 MG tablet  Commonly known as: SEROQUEL  Take 1 tablet (50 mg total) by mouth 2 (two) times daily.  What changed:   · medication strength  · how much to take  · when to take this        CONTINUE taking these medications    amiodarone 200 MG Tab  Commonly known as: PACERONE  Take 200 mg by mouth once daily.     ELIQUIS 5 mg Tab  Generic drug: apixaban  Take 5 mg by mouth 2 (two) times daily.        STOP taking these medications    carvediloL 3.125 MG tablet  Commonly known as: COREG     valsartan 40 MG tablet  Commonly known as: DIOVAN            Indwelling Lines/Drains at time of discharge:   Lines/Drains/Airways     Peripherally Inserted Central Catheter Line  Duration           PICC Double Lumen 05/12/22 1520 left basilic 5 days                Time spent on the discharge of patient: 35 minutes         Leah Lora MD  Department of Hospital Medicine  [facility]

## 2022-05-18 NOTE — PROGRESS NOTES
Wound care note;  Patient skin was evaluated today.  TREVIN Means FNP assessed skin today.  Left dorsal foot and Right lateral lower leg wounds healed, pink tissue. Open to air.  Sacral crease with dry tan linear discomfort skin noted, intact.  Cont calmospetine cream   Bilateral heels with out pressure injury noted  Scrotum and BLE's edema much improved.   Patient able to assist with turning.  Cont turn protocol  Pillows to offload heels   On low air loss mattress  Wound care team to follow  Poss. D/c in am

## 2022-05-18 NOTE — ASSESSMENT & PLAN NOTE
- Continuous oxygen  - Continue bumetinide 1mg IV bid  - duonebs  - Cardiology consult  - ECHO pending  - telemetry  - continue metoprolol 50mg bid   - daily weights  - daily I+O  - low sodium diet  - dietary consult      05/11 sob this morning  02 per nc   Cardiology consulted       05/12 continue diuresis   Continue dopamine drip    5/16 met this morning on oral and IV infusion of amiodarone which was added yesterday due to AFib RVR.  Patient being diuresed.  Feeling better overall.  Will get Cardiology input on his medications - need an oral regimen for discharge home.  Discussed with his sister Eber with open have the patient back home to his end of this week on hospice.  He does not wear Keen catheter at home and therefore we will get this discontinued today.  Case discussed with  for her to get discharge plan in progress.    5/17 case was discussed with cardiologist Dr. Adame yesterday and the patient's overall prognosis is poor therefore he agrees with plan for hospice.  Patient transition to all oral medications and I discussed the case with  today who has set up home hospice and so patient will be discharged tomorrow.    5/18 On the day of discharge vital signs were 105/58, 66, 16, 98.6, 91% on 2 L of oxygen.  On exam he was comfortable having just completed his entire breakfast.  No oral exudates, moist mucosa, no added lung sounds, lowered systolic heart murmur, abdomen soft and nontender, chronic bilateral leg edema.  Lab investigations reviewed, slight increase in creatinine related to diuresis.  Patient with overall poor prognosis and he accepted hospice care at home.  His care was discussed with his sister 2 days ago and with his brother yesterday.

## 2022-05-18 NOTE — PLAN OF CARE
Problem: Adult Inpatient Plan of Care  Goal: Plan of Care Review  5/17/2022 1941 by Myrna Navarro CRTT  Outcome: Ongoing, Progressing  5/17/2022 0739 by Myrna Navarro CRTT  Outcome: Ongoing, Progressing  Goal: Patient-Specific Goal (Individualized)  Outcome: Ongoing, Progressing  Goal: Absence of Hospital-Acquired Illness or Injury  Outcome: Ongoing, Progressing  Goal: Optimal Comfort and Wellbeing  Outcome: Ongoing, Progressing  Goal: Readiness for Transition of Care  Outcome: Ongoing, Progressing     Problem: Skin Injury Risk Increased  Goal: Skin Health and Integrity  Outcome: Ongoing, Progressing     Problem: Gas Exchange Impaired  Goal: Optimal Gas Exchange  Outcome: Ongoing, Progressing     Problem: Fluid and Electrolyte Imbalance (Acute Kidney Injury/Impairment)  Goal: Fluid and Electrolyte Balance  Outcome: Ongoing, Progressing     Problem: Oral Intake Inadequate (Acute Kidney Injury/Impairment)  Goal: Optimal Nutrition Intake  Outcome: Ongoing, Progressing     Problem: Renal Function Impairment (Acute Kidney Injury/Impairment)  Goal: Effective Renal Function  Outcome: Ongoing, Progressing     Problem: Impaired Wound Healing  Goal: Optimal Wound Healing  Outcome: Ongoing, Progressing     Problem: Infection  Goal: Absence of Infection Signs and Symptoms  Outcome: Ongoing, Progressing

## 2022-05-18 NOTE — DISCHARGE INSTRUCTIONS
Physical therapy Function:  Bed Mobility:     Supine to Sit: independence  Transfers:     Sit to Stand:  independence with rolling walker  Gait: 36' x 2 trials contact guard assist with RW; slow connor,short step length, seated rest break        Occupational Therapy Home Exercise Program:- May be performed with cane, dowel, or rolled towel    SHOULDER: Flexion - Sitting        Hold cane with both hands. Raise arms up. Keep elbows straight. Hold 3 seconds.   10 to 15 reps per set, 2 sets per day, 5 to 7days per week    Copyright © [employer]. All rights reserved.   SHOULDER: Abduction / Adduction (Cane)        Hold cane with both hands. Raise arms out to one side. Repeat to other side. Hold 3 seconds each side.   10 to 15 reps per set, 2 sets per day, 5 to 7 days per week    Copyright © [employer]. All rights reserved.   Supine: Chest Press (Active)        Lie on back with arms fully extended. Lower cane or dowel slowly to chest and press to arm's length.   Complete 2 sets of 10 to 15 repetitions. Perform  5 to 7 sessions per week.    Copyright © [employer]. All rights reserved.   ELBOW: Flexion (Cane)        Hold cane with both hands. Bend and straighten elbows. Hold 3 seconds.   10 to 15 reps per set, 2 sets per day, 5 to 7 days per week    Copyright © [employer]. All rights reserved.     Ankle Pump        Bend ankles up and down, alternating feet.  Repeat 30 times. Do 2 sessions per day.       Quad Set        Slowly tighten muscles on thigh of right straight leg while counting out loud to 5 .   Repeat 30 times. Repeat on the left 30 times. Do 2 sessions per day.           Gluteal Sets        Squeeze pelvic floor and hold. Tighten bottom. Repeat 30 times. Do 2 sessions a day.           Heel Slide        Bend knee and pull right heel toward buttocks. Straighten out the leg.  Repeat 30 times. Repeat on the left 30 times. Do 2 sessions per day.           Hip Abduction / Adduction: with Extended Knee (Supine)        Bring right leg out to side  and return to midline position. Keep knee straight.  Repeat 30 times. Repeat on the left 30 times. Do 2 sessions per day.            Straight Leg Raise        Bend left leg. Raise right leg 8-12 inches with knee locked. Exhale and tighten thigh muscles while raising leg.   Repeat 30 times. Switch legs and repeat 30 times. Do 2 sessions per day.           Bridging        Lie on back with feet shoulder width apart. Lift hips toward the ceiling. Hold 5 seconds.  Repeat 30 times. Do 2 sessions per day.         KNEE: Extension, Long Arc Quads - Sitting        Raise right foot until knee is straight. Return foot to the floor.  Repeat 30 times. Repeat on the left 30 times. Do 2 sessions per day.       Copyright © [employer]. All rights reserved.

## 2022-05-18 NOTE — NURSING
Wheeled out to POV in wheelchair. Home O2 in use Denies pain or discomfort at this time. Discharge instruction given to sister and verbalize understanding.

## 2022-05-18 NOTE — PLAN OF CARE
[facility]  Discharge Final Note    Primary Care Provider: Primary Doctor No    Expected Discharge Date:     Final Discharge Note (most recent)     Final Note - 05/18/22 0900        Final Note    Assessment Type Final Discharge Note     Anticipated Discharge Disposition Hospice/Home        Post-Acute Status    Post-Acute Authorization Hospice     Hospice Status Set-up Complete/Auth obtained     Patient choice form signed by patient/caregiver List with quality metrics by geographic area provided;List from CMS Compare;List from System Post-Acute Care     Discharge Delays None known at this time                 Important Message from Medicare  Important Message from Medicare regarding Discharge Appeal Rights: Given to patient/caregiver, Explained to patient/caregiver, Signed/date by patient/caregiver     Date IMM was signed: 05/18/22  Time IMM was signed: 0900    SS spoke with pt's sister. Pt to discharge back home today with [facility]. Sister to  patient.

## 2022-05-18 NOTE — PT/OT/SLP PROGRESS
Occupational Therapy   Treatment    Name: Modesto Patrick  MRN: [id number]  Admitting Diagnosis:  Biventricular congestive heart failure       Recommendations:     Discharge Recommendations: nursing facility, skilled  Discharge Equipment Recommendations:   (to be determined)  Barriers to discharge:  None    Assessment:     Modesto aPtrick is a 61 y.o. male with a medical diagnosis of Biventricular congestive heart failure.  He presents with no complaints.Pt agreed to OT treatment. Performance deficits affecting function are weakness, impaired endurance, impaired self care skills, impaired functional mobilty.     Rehab Prognosis:  Good; patient would benefit from acute skilled OT services to address these deficits and reach maximum level of function.       Plan:     Patient to be seen 5 x/week to address the above listed problems via self-care/home management, therapeutic activities, therapeutic exercises  · Plan of Care Expires: 05/17/22  · Plan of Care Reviewed with: patient    Subjective     Pain/Comfort:  · Pain Rating 1: 0/10    Objective:     Communicated with: JANETH Willard prior to session.  Patient found HOB elevated with blood pressure cuff, oxygen, telemetry upon OT entry to room.    General Precautions: Standard, fall   Orthopedic Precautions:N/A   Braces: N/A  Respiratory Status: Nasal cannula, flow 2 L/min     Occupational Performance:     Bed Mobility:    · Patient completed Supine to Sit with modified independence     Functional Mobility/Transfers:  · Patient completed Sit <> Stand Transfer with independence  with  gait belt to stand to RW   · Patient completed Bed <> Chair Transfer using Step Transfer technique with contact guard assistance with rolling walker  · Functional Mobility: CGA    Activities of Daily Living:  · Grooming: independence to perform oral hygiene with swabs  · I with washing face and I with combing beard after tangles removed  · Upper Body Dressing: minimum assistance  donning gown as a robe  · Toileting: minimum assistance with setup of urinal      AMPAC 6 Click ADL: 16    Treatment & Education:  Pt performed UE strengthening exercises. 2 lb wrist wt on (L) and hand wt on (R) x 2 sets of 15 reps (B) shoulder flexion/extension,adduction/abduction and (B) elbow and wrist flexion/extension. Red theraband x 2 sets of 15 reps (R) elbow flexion and extension. Hand exerciser x 2 sets of 25 reps on (R) hand.  Pt participated well with tx,    Patient left up in chair with all lines intact, call button in reach and nurse notifiedEducation:      GOALS:   Multidisciplinary Problems     Occupational Therapy Goals        Problem: Occupational Therapy    Goal Priority Disciplines Outcome Interventions   Occupational Therapy Goal     OT, PT/OT Ongoing, Progressing    Description: Pt is getting a 1 week trial to determine pt's ability to participate and progress with OT services.  STG:  Pt will perform grooming with setup  Pt will bathe with min (A) for upper body anterior  Pt will perform UE dressing with setup and min(A)  Pt will sit EOB x 7 min with CGA/ min(A)  Pt will tolerate 20 minutes of tx without fatigue      LT.Restore to max I with self care and mobility.                      Time Tracking:     OT Date of Treatment: 22  OT Start Time: 1418 (1326)  OT Stop Time: 1441 (1336)  OT Total Time (min): 23 min    Billable Minutes:Self Care/Home Management 10  Therapeutic Exercise 15    OT/GILDA: OT          2022

## 2022-05-18 NOTE — RESPIRATORY THERAPY
Patient is currently on nasal cannula @ 2lpm. Also receiving nebulizer treatments with DuoNeb medication Q6hrs.

## 2022-05-19 NOTE — SUBJECTIVE & OBJECTIVE
Interval History:  He was seen this a.m..  He denies shortness of breath.  He is alert.    Review of patient's allergies indicates:   Allergen Reactions    Lasix [furosemide]      No current facility-administered medications for this encounter.     Current Outpatient Medications   Medication    amiodarone (PACERONE) 200 MG Tab    bumetanide (BUMEX) 1 MG tablet    buPROPion (WELLBUTRIN) 75 MG tablet    ELIQUIS 5 mg Tab    [START ON 5/19/2022] famotidine (PEPCID) 20 MG tablet    lactulose (CHRONULAC) 20 gram/30 mL Soln    [START ON 5/19/2022] metOLazone (ZAROXOLYN) 5 MG tablet    metoprolol tartrate (LOPRESSOR) 25 MG tablet    midodrine (PROAMATINE) 5 MG Tab    [START ON 5/19/2022] polyethylene glycol (GLYCOLAX) 17 gram PwPk    QUEtiapine (SEROQUEL) 50 MG tablet    rifAXIMin (XIFAXAN) 550 mg Tab       Objective:     Vital Signs (Most Recent):  Temp: 98.6 °F (37 °C) (05/18/22 0800)  Pulse: 64 (05/18/22 1300)  Resp: (!) 23 (05/18/22 1300)  BP: (!) 90/47 (05/18/22 1300)  SpO2: 97 % (05/18/22 1300)  O2 Device (Oxygen Therapy): nasal cannula (05/18/22 0748)   Vital Signs (24h Range):  Temp:  [98.6 °F (37 °C)-98.7 °F (37.1 °C)] 98.6 °F (37 °C)  Pulse:  [62-80] 64  Resp:  [12-27] 23  SpO2:  [72 %-100 %] 97 %  BP: ()/(47-69) 90/47     Weight: 115.4 kg (254 lb 6.6 oz) (05/18/22 0400)  Body mass index is 31.8 kg/m².  Body surface area is 2.47 meters squared.    I/O last 3 completed shifts:  In: 3051 [P.O.:3051]  Out: 3600 [Urine:3600]    Physical Exam  Eyes:      Pupils: Pupils are equal, round, and reactive to light.   Cardiovascular:      Rate and Rhythm: Tachycardia present. Rhythm irregular.   Pulmonary:      Breath sounds: No wheezing or rales.   Abdominal:      Tenderness: There is no abdominal tenderness.   Genitourinary:     Comments: Scrotal swelling  Musculoskeletal:      Cervical back: Neck supple.      Right lower leg: Edema present.      Left lower leg: Edema present.   Neurological:      Mental Status: He is  alert.       Significant Labs:  BMP:   Recent Labs   Lab 05/18/22  0318   GLU 87      K 3.5   CL 87*   CO2 52*   BUN 83*   CREATININE 2.81*   CALCIUM 8.4*        Significant Imaging:

## 2022-05-19 NOTE — PROGRESS NOTES
[facility]  Nephrology  Progress Note    Patient Name: Modesto Patrick  MRN: [id number]  Admission Date: 5/9/2022  Hospital Length of Stay: 9 days  Attending Provider: No att. providers found   Primary Care Physician: Primary Doctor No  Principal Problem:Biventricular congestive heart failure    Subjective:     HPI: I have been following Mr. Patrick during his hospitalization at [facility].  He has biventricular heart failure, AFib, cirrhosis, and acute on chronic renal failure.  He has significant lower extremity and scrotal edema.  Albumin is low.  He has been receiving IV albumin and IV Bumex for the past 3 days.  Creatinine has risen from 2.8 to 3.7 during that time.      Interval History:  He was seen this a.m..  He denies shortness of breath.  He is alert.    Review of patient's allergies indicates:   Allergen Reactions    Lasix [furosemide]      No current facility-administered medications for this encounter.     Current Outpatient Medications   Medication    amiodarone (PACERONE) 200 MG Tab    bumetanide (BUMEX) 1 MG tablet    buPROPion (WELLBUTRIN) 75 MG tablet    ELIQUIS 5 mg Tab    [START ON 5/19/2022] famotidine (PEPCID) 20 MG tablet    lactulose (CHRONULAC) 20 gram/30 mL Soln    [START ON 5/19/2022] metOLazone (ZAROXOLYN) 5 MG tablet    metoprolol tartrate (LOPRESSOR) 25 MG tablet    midodrine (PROAMATINE) 5 MG Tab    [START ON 5/19/2022] polyethylene glycol (GLYCOLAX) 17 gram PwPk    QUEtiapine (SEROQUEL) 50 MG tablet    rifAXIMin (XIFAXAN) 550 mg Tab       Objective:     Vital Signs (Most Recent):  Temp: 98.6 °F (37 °C) (05/18/22 0800)  Pulse: 64 (05/18/22 1300)  Resp: (!) 23 (05/18/22 1300)  BP: (!) 90/47 (05/18/22 1300)  SpO2: 97 % (05/18/22 1300)  O2 Device (Oxygen Therapy): nasal cannula (05/18/22 0748)   Vital Signs (24h Range):  Temp:  [98.6 °F (37 °C)-98.7 °F (37.1 °C)] 98.6 °F (37 °C)  Pulse:  [62-80] 64  Resp:  [12-27] 23  SpO2:  [72 %-100 %] 97 %  BP:  ()/(47-69) 90/47     Weight: 115.4 kg (254 lb 6.6 oz) (05/18/22 0400)  Body mass index is 31.8 kg/m².  Body surface area is 2.47 meters squared.    I/O last 3 completed shifts:  In: 3051 [P.O.:3051]  Out: 3600 [Urine:3600]    Physical Exam  Eyes:      Pupils: Pupils are equal, round, and reactive to light.   Cardiovascular:      Rate and Rhythm: Tachycardia present. Rhythm irregular.   Pulmonary:      Breath sounds: No wheezing or rales.   Abdominal:      Tenderness: There is no abdominal tenderness.   Genitourinary:     Comments: Scrotal swelling  Musculoskeletal:      Cervical back: Neck supple.      Right lower leg: Edema present.      Left lower leg: Edema present.   Neurological:      Mental Status: He is alert.       Significant Labs:  BMP:   Recent Labs   Lab 05/18/22  0318   GLU 87      K 3.5   CL 87*   CO2 52*   BUN 83*   CREATININE 2.81*   CALCIUM 8.4*        Significant Imaging:      Assessment/Plan:     * Acute on chronic biventricular congestive heart failure    R and L ventricular failure with cirrhosis.      Renal failure (ARF), acute on chronic  Renal function has improved due to improved perfusion while on dopamine.  He is diuresing at a brisk rate.  He has contraction alkalosis    Cirrhosis  Secondary to right heart failure and previous ethanol use    HTN (hypertension)  BP borderline low    A-fib  Chronic Afib.  Paced rhythm    CKD (chronic kidney disease)  CKD 3        Thank you for your consult.     Jaiden Grant MD  Nephrology  [facility]